# Patient Record
Sex: FEMALE | Race: WHITE | NOT HISPANIC OR LATINO | Employment: FULL TIME | URBAN - METROPOLITAN AREA
[De-identification: names, ages, dates, MRNs, and addresses within clinical notes are randomized per-mention and may not be internally consistent; named-entity substitution may affect disease eponyms.]

---

## 2018-01-01 ENCOUNTER — TELEPHONE (OUTPATIENT)
Dept: HEMATOLOGY/ONCOLOGY | Facility: CLINIC | Age: 63
End: 2018-01-01

## 2018-01-01 ENCOUNTER — RESEARCH ENCOUNTER (OUTPATIENT)
Dept: RESEARCH | Facility: HOSPITAL | Age: 63
End: 2018-01-01

## 2018-01-01 ENCOUNTER — INFUSION (OUTPATIENT)
Dept: INFUSION THERAPY | Facility: HOSPITAL | Age: 63
End: 2018-01-01
Attending: INTERNAL MEDICINE
Payer: COMMERCIAL

## 2018-01-01 ENCOUNTER — DOCUMENTATION ONLY (OUTPATIENT)
Dept: HEMATOLOGY/ONCOLOGY | Facility: CLINIC | Age: 63
End: 2018-01-01

## 2018-01-01 ENCOUNTER — PATIENT MESSAGE (OUTPATIENT)
Dept: HEMATOLOGY/ONCOLOGY | Facility: CLINIC | Age: 63
End: 2018-01-01

## 2018-01-01 ENCOUNTER — HOSPITAL ENCOUNTER (INPATIENT)
Facility: HOSPITAL | Age: 63
LOS: 1 days | DRG: 064 | End: 2018-04-25
Attending: EMERGENCY MEDICINE | Admitting: PSYCHIATRY & NEUROLOGY
Payer: COMMERCIAL

## 2018-01-01 ENCOUNTER — OFFICE VISIT (OUTPATIENT)
Dept: HEMATOLOGY/ONCOLOGY | Facility: CLINIC | Age: 63
End: 2018-01-01
Payer: COMMERCIAL

## 2018-01-01 ENCOUNTER — HOSPITAL ENCOUNTER (INPATIENT)
Facility: HOSPITAL | Age: 63
LOS: 5 days | Discharge: HOME | DRG: 812 | End: 2018-03-22
Attending: EMERGENCY MEDICINE | Admitting: EMERGENCY MEDICINE
Payer: COMMERCIAL

## 2018-01-01 ENCOUNTER — TELEPHONE (OUTPATIENT)
Dept: INTERVENTIONAL RADIOLOGY/VASCULAR | Facility: HOSPITAL | Age: 63
End: 2018-01-01

## 2018-01-01 ENCOUNTER — LAB VISIT (OUTPATIENT)
Dept: LAB | Facility: HOSPITAL | Age: 63
End: 2018-01-01
Attending: INTERNAL MEDICINE
Payer: COMMERCIAL

## 2018-01-01 ENCOUNTER — HOSPITAL ENCOUNTER (INPATIENT)
Facility: HOSPITAL | Age: 63
LOS: 1 days | Discharge: HOME | DRG: 813 | End: 2018-04-08
Attending: EMERGENCY MEDICINE | Admitting: INTERNAL MEDICINE
Payer: COMMERCIAL

## 2018-01-01 ENCOUNTER — OFFICE VISIT (OUTPATIENT)
Dept: HEMATOLOGY/ONCOLOGY | Facility: CLINIC | Age: 63
DRG: 064 | End: 2018-01-01
Payer: COMMERCIAL

## 2018-01-01 ENCOUNTER — HOSPITAL ENCOUNTER (OUTPATIENT)
Facility: HOSPITAL | Age: 63
Discharge: HOME | End: 2018-04-05
Attending: INTERNAL MEDICINE | Admitting: RADIOLOGY
Payer: COMMERCIAL

## 2018-01-01 ENCOUNTER — TELEPHONE (OUTPATIENT)
Dept: PHARMACY | Facility: CLINIC | Age: 63
End: 2018-01-01

## 2018-01-01 ENCOUNTER — PATIENT OUTREACH (OUTPATIENT)
Dept: ADMINISTRATIVE | Facility: CLINIC | Age: 63
End: 2018-01-01

## 2018-01-01 VITALS
SYSTOLIC BLOOD PRESSURE: 123 MMHG | RESPIRATION RATE: 17 BRPM | RESPIRATION RATE: 16 BRPM | TEMPERATURE: 99 F | HEART RATE: 68 BPM | DIASTOLIC BLOOD PRESSURE: 86 MMHG | DIASTOLIC BLOOD PRESSURE: 74 MMHG | SYSTOLIC BLOOD PRESSURE: 138 MMHG | BODY MASS INDEX: 28.85 KG/M2 | HEART RATE: 78 BPM | TEMPERATURE: 98 F | TEMPERATURE: 99 F | OXYGEN SATURATION: 99 % | RESPIRATION RATE: 18 BRPM | DIASTOLIC BLOOD PRESSURE: 58 MMHG | SYSTOLIC BLOOD PRESSURE: 102 MMHG | WEIGHT: 169 LBS | HEIGHT: 64 IN | HEART RATE: 77 BPM

## 2018-01-01 VITALS
OXYGEN SATURATION: 96 % | TEMPERATURE: 99 F | SYSTOLIC BLOOD PRESSURE: 137 MMHG | WEIGHT: 178 LBS | HEIGHT: 64 IN | BODY MASS INDEX: 30.39 KG/M2 | DIASTOLIC BLOOD PRESSURE: 67 MMHG | RESPIRATION RATE: 18 BRPM | HEART RATE: 70 BPM

## 2018-01-01 VITALS
RESPIRATION RATE: 18 BRPM | DIASTOLIC BLOOD PRESSURE: 81 MMHG | SYSTOLIC BLOOD PRESSURE: 130 MMHG | HEART RATE: 67 BPM | TEMPERATURE: 99 F

## 2018-01-01 VITALS
SYSTOLIC BLOOD PRESSURE: 132 MMHG | SYSTOLIC BLOOD PRESSURE: 78 MMHG | RESPIRATION RATE: 14 BRPM | DIASTOLIC BLOOD PRESSURE: 89 MMHG | DIASTOLIC BLOOD PRESSURE: 52 MMHG | SYSTOLIC BLOOD PRESSURE: 134 MMHG | TEMPERATURE: 97 F | OXYGEN SATURATION: 100 % | RESPIRATION RATE: 18 BRPM | HEIGHT: 64 IN | HEART RATE: 49 BPM | WEIGHT: 176 LBS | RESPIRATION RATE: 18 BRPM | HEART RATE: 90 BPM | OXYGEN SATURATION: 98 % | DIASTOLIC BLOOD PRESSURE: 72 MMHG | TEMPERATURE: 99 F | WEIGHT: 168.88 LBS | BODY MASS INDEX: 28.14 KG/M2 | HEART RATE: 82 BPM | TEMPERATURE: 98 F | BODY MASS INDEX: 30.05 KG/M2 | HEIGHT: 65 IN

## 2018-01-01 VITALS
HEART RATE: 89 BPM | WEIGHT: 181.69 LBS | TEMPERATURE: 99 F | DIASTOLIC BLOOD PRESSURE: 80 MMHG | SYSTOLIC BLOOD PRESSURE: 142 MMHG | HEART RATE: 60 BPM | SYSTOLIC BLOOD PRESSURE: 140 MMHG | OXYGEN SATURATION: 95 % | RESPIRATION RATE: 18 BRPM | BODY MASS INDEX: 29.48 KG/M2 | HEIGHT: 64 IN | WEIGHT: 171.75 LBS | TEMPERATURE: 98 F | BODY MASS INDEX: 31.02 KG/M2 | DIASTOLIC BLOOD PRESSURE: 82 MMHG

## 2018-01-01 VITALS
OXYGEN SATURATION: 94 % | RESPIRATION RATE: 18 BRPM | DIASTOLIC BLOOD PRESSURE: 60 MMHG | WEIGHT: 169 LBS | TEMPERATURE: 98 F | BODY MASS INDEX: 28.85 KG/M2 | SYSTOLIC BLOOD PRESSURE: 115 MMHG | HEART RATE: 76 BPM | HEIGHT: 64 IN

## 2018-01-01 VITALS
TEMPERATURE: 99 F | OXYGEN SATURATION: 97 % | RESPIRATION RATE: 18 BRPM | HEART RATE: 93 BPM | DIASTOLIC BLOOD PRESSURE: 72 MMHG | BODY MASS INDEX: 30.27 KG/M2 | SYSTOLIC BLOOD PRESSURE: 136 MMHG | WEIGHT: 176.38 LBS

## 2018-01-01 VITALS
HEART RATE: 84 BPM | HEIGHT: 64 IN | DIASTOLIC BLOOD PRESSURE: 65 MMHG | RESPIRATION RATE: 18 BRPM | WEIGHT: 175.94 LBS | SYSTOLIC BLOOD PRESSURE: 119 MMHG | BODY MASS INDEX: 30.04 KG/M2 | RESPIRATION RATE: 18 BRPM | SYSTOLIC BLOOD PRESSURE: 105 MMHG | TEMPERATURE: 99 F | TEMPERATURE: 99 F | DIASTOLIC BLOOD PRESSURE: 68 MMHG | HEART RATE: 60 BPM

## 2018-01-01 VITALS
SYSTOLIC BLOOD PRESSURE: 177 MMHG | HEART RATE: 86 BPM | SYSTOLIC BLOOD PRESSURE: 137 MMHG | DIASTOLIC BLOOD PRESSURE: 96 MMHG | RESPIRATION RATE: 18 BRPM | HEART RATE: 89 BPM | DIASTOLIC BLOOD PRESSURE: 83 MMHG | RESPIRATION RATE: 18 BRPM | TEMPERATURE: 98 F | TEMPERATURE: 99 F

## 2018-01-01 VITALS
SYSTOLIC BLOOD PRESSURE: 156 MMHG | TEMPERATURE: 98 F | HEART RATE: 70 BPM | RESPIRATION RATE: 20 BRPM | DIASTOLIC BLOOD PRESSURE: 82 MMHG

## 2018-01-01 VITALS
HEART RATE: 95 BPM | TEMPERATURE: 99 F | RESPIRATION RATE: 18 BRPM | SYSTOLIC BLOOD PRESSURE: 143 MMHG | DIASTOLIC BLOOD PRESSURE: 86 MMHG

## 2018-01-01 VITALS
HEART RATE: 90 BPM | TEMPERATURE: 98 F | SYSTOLIC BLOOD PRESSURE: 114 MMHG | DIASTOLIC BLOOD PRESSURE: 68 MMHG | RESPIRATION RATE: 16 BRPM

## 2018-01-01 DIAGNOSIS — D46.9 MDS (MYELODYSPLASTIC SYNDROME): ICD-10-CM

## 2018-01-01 DIAGNOSIS — D46.9 MDS (MYELODYSPLASTIC SYNDROME): Primary | ICD-10-CM

## 2018-01-01 DIAGNOSIS — Z00.6 EXAMINATION OF PARTICIPANT IN CLINICAL TRIAL: ICD-10-CM

## 2018-01-01 DIAGNOSIS — D69.6 THROMBOCYTOPENIA: Primary | ICD-10-CM

## 2018-01-01 DIAGNOSIS — F32.9 REACTIVE DEPRESSION: Primary | ICD-10-CM

## 2018-01-01 DIAGNOSIS — D69.6 THROMBOCYTOPENIA, UNSPECIFIED: ICD-10-CM

## 2018-01-01 DIAGNOSIS — D69.6 THROMBOCYTOPENIA: ICD-10-CM

## 2018-01-01 DIAGNOSIS — Z95.828 PORT-A-CATH IN PLACE: ICD-10-CM

## 2018-01-01 DIAGNOSIS — J45.20 MILD INTERMITTENT ASTHMA WITHOUT COMPLICATION: ICD-10-CM

## 2018-01-01 DIAGNOSIS — D46.Z MDS (MYELODYSPLASTIC SYNDROME), HIGH GRADE: Primary | ICD-10-CM

## 2018-01-01 DIAGNOSIS — D46.Z MDS (MYELODYSPLASTIC SYNDROME), HIGH GRADE: ICD-10-CM

## 2018-01-01 DIAGNOSIS — D46.C MDS (MYELODYSPLASTIC SYNDROME) WITH 5Q DELETION: ICD-10-CM

## 2018-01-01 DIAGNOSIS — Z72.0 TOBACCO ABUSE: ICD-10-CM

## 2018-01-01 DIAGNOSIS — D64.9 SYMPTOMATIC ANEMIA: ICD-10-CM

## 2018-01-01 DIAGNOSIS — D69.9 BLEEDING DISORDER: Primary | ICD-10-CM

## 2018-01-01 DIAGNOSIS — D64.9 ANEMIA, UNSPECIFIED TYPE: ICD-10-CM

## 2018-01-01 DIAGNOSIS — S06.6X9A SUBARACHNOID HEMATOMA WITH LOSS OF CONSCIOUSNESS, INITIAL ENCOUNTER: Primary | ICD-10-CM

## 2018-01-01 DIAGNOSIS — I10 ESSENTIAL HYPERTENSION: ICD-10-CM

## 2018-01-01 DIAGNOSIS — D46.C MDS (MYELODYSPLASTIC SYNDROME) WITH 5Q DELETION: Primary | ICD-10-CM

## 2018-01-01 DIAGNOSIS — E87.6 HYPOKALEMIA: Primary | ICD-10-CM

## 2018-01-01 DIAGNOSIS — I60.9 SAH (SUBARACHNOID HEMORRHAGE): ICD-10-CM

## 2018-01-01 DIAGNOSIS — R06.02 SOB (SHORTNESS OF BREATH): ICD-10-CM

## 2018-01-01 DIAGNOSIS — E87.6 HYPOKALEMIA: ICD-10-CM

## 2018-01-01 DIAGNOSIS — I63.9 STROKE: ICD-10-CM

## 2018-01-01 DIAGNOSIS — J44.9 CHRONIC OBSTRUCTIVE PULMONARY DISEASE, UNSPECIFIED COPD TYPE: ICD-10-CM

## 2018-01-01 DIAGNOSIS — D61.818 PANCYTOPENIA: Primary | ICD-10-CM

## 2018-01-01 DIAGNOSIS — I60.9 NONTRAUMATIC SUBARACHNOID HEMORRHAGE: ICD-10-CM

## 2018-01-01 DIAGNOSIS — R11.0 NAUSEA: ICD-10-CM

## 2018-01-01 LAB
ABO + RH BLD: NORMAL
ALBUMIN SERPL BCP-MCNC: 2.2 G/DL
ALBUMIN SERPL BCP-MCNC: 2.5 G/DL
ALBUMIN SERPL BCP-MCNC: 2.9 G/DL
ALBUMIN SERPL BCP-MCNC: 3.3 G/DL
ALBUMIN SERPL BCP-MCNC: 3.4 G/DL
ALBUMIN SERPL BCP-MCNC: 3.5 G/DL
ALBUMIN SERPL BCP-MCNC: 3.7 G/DL
ALLENS TEST: ABNORMAL
ALP SERPL-CCNC: 135 U/L
ALP SERPL-CCNC: 57 U/L
ALP SERPL-CCNC: 59 U/L
ALP SERPL-CCNC: 62 U/L
ALP SERPL-CCNC: 65 U/L
ALP SERPL-CCNC: 86 U/L
ALP SERPL-CCNC: 95 U/L
ALT SERPL W/O P-5'-P-CCNC: 10 U/L
ALT SERPL W/O P-5'-P-CCNC: 12 U/L
ALT SERPL W/O P-5'-P-CCNC: 21 U/L
ALT SERPL W/O P-5'-P-CCNC: 37 U/L
ALT SERPL W/O P-5'-P-CCNC: 59 U/L
ALT SERPL W/O P-5'-P-CCNC: 61 U/L
ALT SERPL W/O P-5'-P-CCNC: 90 U/L
ANA SER QL IF: NORMAL
ANION GAP SERPL CALC-SCNC: 10 MMOL/L
ANION GAP SERPL CALC-SCNC: 11 MMOL/L
ANION GAP SERPL CALC-SCNC: 11 MMOL/L
ANION GAP SERPL CALC-SCNC: 12 MMOL/L
ANION GAP SERPL CALC-SCNC: 17 MMOL/L
ANION GAP SERPL CALC-SCNC: 8 MMOL/L
ANION GAP SERPL CALC-SCNC: 9 MMOL/L
ANISOCYTOSIS BLD QL SMEAR: ABNORMAL
ANISOCYTOSIS BLD QL SMEAR: SLIGHT
APTT BLDCRRT: 23.5 SEC
APTT BLDCRRT: 24.3 SEC
APTT BLDCRRT: 26.2 SEC
APTT BLDCRRT: 26.7 SEC
AST SERPL-CCNC: 14 U/L
AST SERPL-CCNC: 14 U/L
AST SERPL-CCNC: 19 U/L
AST SERPL-CCNC: 27 U/L
AST SERPL-CCNC: 47 U/L
AST SERPL-CCNC: 50 U/L
AST SERPL-CCNC: 85 U/L
BACTERIA BLD CULT: NORMAL
BACTERIA BLD CULT: NORMAL
BASO STIPL BLD QL SMEAR: ABNORMAL
BASOPHILS # BLD AUTO: 0 K/UL
BASOPHILS # BLD AUTO: 0 K/UL
BASOPHILS # BLD AUTO: ABNORMAL K/UL
BASOPHILS NFR BLD: 0 %
BASOPHILS NFR BLD: 1 %
BASOPHILS NFR BLD: 1 %
BILIRUB SERPL-MCNC: 1 MG/DL
BILIRUB SERPL-MCNC: 1.2 MG/DL
BILIRUB SERPL-MCNC: 1.8 MG/DL
BILIRUB SERPL-MCNC: 2 MG/DL
BILIRUB SERPL-MCNC: 2.3 MG/DL
BILIRUB SERPL-MCNC: 2.4 MG/DL
BILIRUB SERPL-MCNC: 2.4 MG/DL
BILIRUB UR QL STRIP: NEGATIVE
BILIRUB UR QL STRIP: NEGATIVE
BLASTS NFR BLD MANUAL: 1 %
BLASTS NFR BLD MANUAL: 13 %
BLASTS NFR BLD MANUAL: 14 %
BLASTS NFR BLD MANUAL: 2 %
BLASTS NFR BLD MANUAL: 3 %
BLASTS NFR BLD MANUAL: 3 %
BLASTS NFR BLD MANUAL: 4 %
BLASTS NFR BLD MANUAL: 4 %
BLD GP AB SCN CELLS X3 SERPL QL: NORMAL
BLD PROD TYP BPU: NORMAL
BLOOD GROUP ANTIBODIES SERPL: NORMAL
BLOOD GROUP ANTIBODIES SERPL: NORMAL
BLOOD UNIT EXPIRATION DATE: NORMAL
BLOOD UNIT TYPE CODE: 1700
BLOOD UNIT TYPE CODE: 5100
BLOOD UNIT TYPE CODE: 600
BLOOD UNIT TYPE CODE: 6200
BLOOD UNIT TYPE CODE: 7300
BLOOD UNIT TYPE CODE: 9500
BLOOD UNIT TYPE: NORMAL
BODY SITE - BONE MARROW: NORMAL
BONE MARROW IRON STAIN COMMENT: NORMAL
BONE MARROW WRIGHT STAIN COMMENT: NORMAL
BUN SERPL-MCNC: 10 MG/DL
BUN SERPL-MCNC: 12 MG/DL
BUN SERPL-MCNC: 14 MG/DL
BUN SERPL-MCNC: 14 MG/DL
BUN SERPL-MCNC: 15 MG/DL
BUN SERPL-MCNC: 15 MG/DL
BUN SERPL-MCNC: 16 MG/DL
BUN SERPL-MCNC: 16 MG/DL
BUN SERPL-MCNC: 16 MG/DL (ref 6–30)
BUN SERPL-MCNC: 17 MG/DL
BUN SERPL-MCNC: 18 MG/DL
BUN SERPL-MCNC: 25 MG/DL
BUN SERPL-MCNC: 29 MG/DL
BURR CELLS BLD QL SMEAR: ABNORMAL
CALCIUM SERPL-MCNC: 7.6 MG/DL
CALCIUM SERPL-MCNC: 8.2 MG/DL
CALCIUM SERPL-MCNC: 8.3 MG/DL
CALCIUM SERPL-MCNC: 8.6 MG/DL
CALCIUM SERPL-MCNC: 8.7 MG/DL
CALCIUM SERPL-MCNC: 8.7 MG/DL
CALCIUM SERPL-MCNC: 8.8 MG/DL
CALCIUM SERPL-MCNC: 9 MG/DL
CALCIUM SERPL-MCNC: 9 MG/DL
CALCIUM SERPL-MCNC: 9.3 MG/DL
CALCIUM SERPL-MCNC: 9.4 MG/DL
CALCIUM SERPL-MCNC: 9.6 MG/DL
CHLORIDE SERPL-SCNC: 100 MMOL/L
CHLORIDE SERPL-SCNC: 100 MMOL/L
CHLORIDE SERPL-SCNC: 101 MMOL/L
CHLORIDE SERPL-SCNC: 101 MMOL/L (ref 95–110)
CHLORIDE SERPL-SCNC: 102 MMOL/L
CHLORIDE SERPL-SCNC: 103 MMOL/L
CHLORIDE SERPL-SCNC: 103 MMOL/L
CHLORIDE SERPL-SCNC: 104 MMOL/L
CHLORIDE SERPL-SCNC: 104 MMOL/L
CHLORIDE SERPL-SCNC: 105 MMOL/L
CHLORIDE SERPL-SCNC: 97 MMOL/L
CHLORIDE SERPL-SCNC: 97 MMOL/L
CHLORIDE SERPL-SCNC: 98 MMOL/L
CHOLEST SERPL-MCNC: 99 MG/DL
CHOLEST/HDLC SERPL: 5 {RATIO}
CHROM BANDING METHOD: NORMAL
CHROMOSOME ANALYSIS BM ADDITIONAL INFORMATION: NORMAL
CHROMOSOME ANALYSIS BM RELEASED BY: NORMAL
CHROMOSOME ANALYSIS BM RESULT SUMMARY: NORMAL
CK MB SERPL-MCNC: 0.6 NG/ML
CK MB SERPL-RTO: 1.9 %
CK SERPL-CCNC: 32 U/L
CLARITY UR REFRACT.AUTO: CLEAR
CLARITY UR REFRACT.AUTO: CLEAR
CLINICAL CYTOGENETICIST REVIEW: NORMAL
CLINICAL DIAGNOSIS - BONE MARROW: NORMAL
CO2 SERPL-SCNC: 14 MMOL/L
CO2 SERPL-SCNC: 21 MMOL/L
CO2 SERPL-SCNC: 21 MMOL/L
CO2 SERPL-SCNC: 22 MMOL/L
CO2 SERPL-SCNC: 23 MMOL/L
CO2 SERPL-SCNC: 24 MMOL/L
CO2 SERPL-SCNC: 26 MMOL/L
CO2 SERPL-SCNC: 27 MMOL/L
CO2 SERPL-SCNC: 28 MMOL/L
CO2 SERPL-SCNC: 29 MMOL/L
CODING SYSTEM: NORMAL
COLOR UR AUTO: YELLOW
COLOR UR AUTO: YELLOW
CREAT SERPL-MCNC: 0.5 MG/DL (ref 0.5–1.4)
CREAT SERPL-MCNC: 0.6 MG/DL
CREAT SERPL-MCNC: 0.7 MG/DL
CREAT SERPL-MCNC: 0.8 MG/DL
CRP SERPL-MCNC: 114.5 MG/L
DAT IGG-SP REAG RBC-IMP: NORMAL
DELSYS: ABNORMAL
DIFFERENTIAL METHOD: ABNORMAL
DISPENSE STATUS: NORMAL
EOSINOPHIL # BLD AUTO: 0 K/UL
EOSINOPHIL # BLD AUTO: 0 K/UL
EOSINOPHIL # BLD AUTO: ABNORMAL K/UL
EOSINOPHIL NFR BLD: 0 %
EOSINOPHIL NFR BLD: 1 %
EOSINOPHIL NFR BLD: 2 %
EOSINOPHIL NFR BLD: 2 %
ERYTHROCYTE [DISTWIDTH] IN BLOOD BY AUTOMATED COUNT: 12.7 %
ERYTHROCYTE [DISTWIDTH] IN BLOOD BY AUTOMATED COUNT: 12.7 %
ERYTHROCYTE [DISTWIDTH] IN BLOOD BY AUTOMATED COUNT: 12.9 %
ERYTHROCYTE [DISTWIDTH] IN BLOOD BY AUTOMATED COUNT: 13.3 %
ERYTHROCYTE [DISTWIDTH] IN BLOOD BY AUTOMATED COUNT: 13.3 %
ERYTHROCYTE [DISTWIDTH] IN BLOOD BY AUTOMATED COUNT: 13.4 %
ERYTHROCYTE [DISTWIDTH] IN BLOOD BY AUTOMATED COUNT: 13.5 %
ERYTHROCYTE [DISTWIDTH] IN BLOOD BY AUTOMATED COUNT: 13.5 %
ERYTHROCYTE [DISTWIDTH] IN BLOOD BY AUTOMATED COUNT: 13.6 %
ERYTHROCYTE [DISTWIDTH] IN BLOOD BY AUTOMATED COUNT: 13.6 %
ERYTHROCYTE [DISTWIDTH] IN BLOOD BY AUTOMATED COUNT: 14 %
ERYTHROCYTE [DISTWIDTH] IN BLOOD BY AUTOMATED COUNT: 14.1 %
ERYTHROCYTE [DISTWIDTH] IN BLOOD BY AUTOMATED COUNT: 14.9 %
ERYTHROCYTE [DISTWIDTH] IN BLOOD BY AUTOMATED COUNT: 15.7 %
ERYTHROCYTE [DISTWIDTH] IN BLOOD BY AUTOMATED COUNT: 16.1 %
ERYTHROCYTE [DISTWIDTH] IN BLOOD BY AUTOMATED COUNT: 16.4 %
ERYTHROCYTE [DISTWIDTH] IN BLOOD BY AUTOMATED COUNT: 16.6 %
ERYTHROCYTE [DISTWIDTH] IN BLOOD BY AUTOMATED COUNT: 16.7 %
ERYTHROCYTE [DISTWIDTH] IN BLOOD BY AUTOMATED COUNT: 17.6 %
ERYTHROCYTE [DISTWIDTH] IN BLOOD BY AUTOMATED COUNT: 17.8 %
ERYTHROCYTE [DISTWIDTH] IN BLOOD BY AUTOMATED COUNT: 18.1 %
ERYTHROCYTE [DISTWIDTH] IN BLOOD BY AUTOMATED COUNT: 18.2 %
ERYTHROCYTE [DISTWIDTH] IN BLOOD BY AUTOMATED COUNT: 18.3 %
ERYTHROCYTE [DISTWIDTH] IN BLOOD BY AUTOMATED COUNT: 18.6 %
ERYTHROCYTE [SEDIMENTATION RATE] IN BLOOD BY WESTERGREN METHOD: 16 MM/H
ERYTHROCYTE [SEDIMENTATION RATE] IN BLOOD BY WESTERGREN METHOD: 65 MM/HR
EST. GFR  (AFRICAN AMERICAN): >60 ML/MIN/1.73 M^2
EST. GFR  (NON AFRICAN AMERICAN): >60 ML/MIN/1.73 M^2
ESTIMATED AVG GLUCOSE: 100 MG/DL
FIO2: 50
FLOW CYTOMETRY ANTIBODIES ANALYZED - BONE MARROW: NORMAL
FLOW CYTOMETRY COMMENT - BONE MARROW: NORMAL
FLOW CYTOMETRY INTERPRETATION - BONE MARROW: NORMAL
GLUCOSE SERPL-MCNC: 101 MG/DL
GLUCOSE SERPL-MCNC: 101 MG/DL
GLUCOSE SERPL-MCNC: 105 MG/DL
GLUCOSE SERPL-MCNC: 105 MG/DL
GLUCOSE SERPL-MCNC: 109 MG/DL
GLUCOSE SERPL-MCNC: 111 MG/DL
GLUCOSE SERPL-MCNC: 115 MG/DL
GLUCOSE SERPL-MCNC: 115 MG/DL
GLUCOSE SERPL-MCNC: 116 MG/DL
GLUCOSE SERPL-MCNC: 137 MG/DL
GLUCOSE SERPL-MCNC: 149 MG/DL
GLUCOSE SERPL-MCNC: 153 MG/DL
GLUCOSE SERPL-MCNC: 153 MG/DL
GLUCOSE SERPL-MCNC: 180 MG/DL
GLUCOSE SERPL-MCNC: 180 MG/DL (ref 70–110)
GLUCOSE UR QL STRIP: NEGATIVE
GLUCOSE UR QL STRIP: NEGATIVE
HAPTOGLOB SERPL-MCNC: 189 MG/DL
HBA1C MFR BLD HPLC: 5.1 %
HCO3 UR-SCNC: 21.3 MMOL/L (ref 24–28)
HCT VFR BLD AUTO: 12.6 %
HCT VFR BLD AUTO: 13 %
HCT VFR BLD AUTO: 14.3 %
HCT VFR BLD AUTO: 15.4 %
HCT VFR BLD AUTO: 15.9 %
HCT VFR BLD AUTO: 16 %
HCT VFR BLD AUTO: 17.1 %
HCT VFR BLD AUTO: 17.7 %
HCT VFR BLD AUTO: 17.8 %
HCT VFR BLD AUTO: 18.8 %
HCT VFR BLD AUTO: 19 %
HCT VFR BLD AUTO: 19.7 %
HCT VFR BLD AUTO: 19.8 %
HCT VFR BLD AUTO: 20.8 %
HCT VFR BLD AUTO: 21.1 %
HCT VFR BLD AUTO: 21.6 %
HCT VFR BLD AUTO: 22 %
HCT VFR BLD AUTO: 22 %
HCT VFR BLD AUTO: 22.1 %
HCT VFR BLD AUTO: 23.6 %
HCT VFR BLD AUTO: 25.2 %
HCT VFR BLD AUTO: 26.1 %
HCT VFR BLD AUTO: 27.9 %
HCT VFR BLD AUTO: 28.1 %
HCT VFR BLD CALC: 23 %PCV (ref 36–54)
HDLC SERPL-MCNC: 20 MG/DL
HDLC SERPL: 20.2 %
HGB BLD-MCNC: 4.3 G/DL
HGB BLD-MCNC: 4.5 G/DL
HGB BLD-MCNC: 5 G/DL
HGB BLD-MCNC: 5.4 G/DL
HGB BLD-MCNC: 5.5 G/DL
HGB BLD-MCNC: 5.5 G/DL
HGB BLD-MCNC: 6.1 G/DL
HGB BLD-MCNC: 6.1 G/DL
HGB BLD-MCNC: 6.2 G/DL
HGB BLD-MCNC: 6.5 G/DL
HGB BLD-MCNC: 6.5 G/DL
HGB BLD-MCNC: 6.9 G/DL
HGB BLD-MCNC: 6.9 G/DL
HGB BLD-MCNC: 7.1 G/DL
HGB BLD-MCNC: 7.3 G/DL
HGB BLD-MCNC: 7.5 G/DL
HGB BLD-MCNC: 7.6 G/DL
HGB BLD-MCNC: 7.9 G/DL
HGB BLD-MCNC: 8.1 G/DL
HGB BLD-MCNC: 8.1 G/DL
HGB BLD-MCNC: 8.9 G/DL
HGB BLD-MCNC: 9.2 G/DL
HGB BLD-MCNC: 9.7 G/DL
HGB BLD-MCNC: 9.9 G/DL
HGB UR QL STRIP: ABNORMAL
HGB UR QL STRIP: ABNORMAL
HYPOCHROMIA BLD QL SMEAR: ABNORMAL
IMM GRANULOCYTES # BLD AUTO: 0 K/UL
IMM GRANULOCYTES # BLD AUTO: 0 K/UL
IMM GRANULOCYTES # BLD AUTO: 0.64 K/UL
IMM GRANULOCYTES # BLD AUTO: 1.44 K/UL
IMM GRANULOCYTES # BLD AUTO: ABNORMAL K/UL
IMM GRANULOCYTES NFR BLD AUTO: 0 %
IMM GRANULOCYTES NFR BLD AUTO: 0 %
IMM GRANULOCYTES NFR BLD AUTO: ABNORMAL %
INR PPP: 1.1
INR PPP: 1.2
KARYOTYP MAR: NORMAL
KETONES UR QL STRIP: NEGATIVE
KETONES UR QL STRIP: NEGATIVE
LDH SERPL L TO P-CCNC: 617 U/L
LDLC SERPL CALC-MCNC: 59.2 MG/DL
LEUKOCYTE ESTERASE UR QL STRIP: NEGATIVE
LEUKOCYTE ESTERASE UR QL STRIP: NEGATIVE
LYMPHOCYTES # BLD AUTO: 0.2 K/UL
LYMPHOCYTES # BLD AUTO: 0.3 K/UL
LYMPHOCYTES # BLD AUTO: ABNORMAL K/UL
LYMPHOCYTES NFR BLD: 25 %
LYMPHOCYTES NFR BLD: 25 %
LYMPHOCYTES NFR BLD: 27 %
LYMPHOCYTES NFR BLD: 28 %
LYMPHOCYTES NFR BLD: 29 %
LYMPHOCYTES NFR BLD: 32 %
LYMPHOCYTES NFR BLD: 34 %
LYMPHOCYTES NFR BLD: 34 %
LYMPHOCYTES NFR BLD: 37 %
LYMPHOCYTES NFR BLD: 37 %
LYMPHOCYTES NFR BLD: 38 %
LYMPHOCYTES NFR BLD: 39 %
LYMPHOCYTES NFR BLD: 39 %
LYMPHOCYTES NFR BLD: 43 %
LYMPHOCYTES NFR BLD: 44 %
LYMPHOCYTES NFR BLD: 45 %
LYMPHOCYTES NFR BLD: 47 %
LYMPHOCYTES NFR BLD: 54 %
LYMPHOCYTES NFR BLD: 76 %
LYMPHOCYTES NFR BLD: 83.3 %
LYMPHOCYTES NFR BLD: 86.5 %
LYMPHOCYTES NFR BLD: 9 %
MAGNESIUM SERPL-MCNC: 1.8 MG/DL
MAGNESIUM SERPL-MCNC: 2.1 MG/DL
MAGNESIUM SERPL-MCNC: 2.2 MG/DL
MAGNESIUM SERPL-MCNC: 2.3 MG/DL
MAGNESIUM SERPL-MCNC: 2.4 MG/DL
MCH RBC QN AUTO: 29.9 PG
MCH RBC QN AUTO: 30 PG
MCH RBC QN AUTO: 30.2 PG
MCH RBC QN AUTO: 30.3 PG
MCH RBC QN AUTO: 30.7 PG
MCH RBC QN AUTO: 31 PG
MCH RBC QN AUTO: 31.1 PG
MCH RBC QN AUTO: 31.1 PG
MCH RBC QN AUTO: 31.2 PG
MCH RBC QN AUTO: 31.8 PG
MCH RBC QN AUTO: 32.6 PG
MCH RBC QN AUTO: 32.9 PG
MCH RBC QN AUTO: 33.3 PG
MCH RBC QN AUTO: 33.5 PG
MCH RBC QN AUTO: 33.7 PG
MCH RBC QN AUTO: 34.5 PG
MCH RBC QN AUTO: 36 PG
MCH RBC QN AUTO: 36.2 PG
MCHC RBC AUTO-ENTMCNC: 33.6 G/DL
MCHC RBC AUTO-ENTMCNC: 34 G/DL
MCHC RBC AUTO-ENTMCNC: 34.1 G/DL
MCHC RBC AUTO-ENTMCNC: 34.2 G/DL
MCHC RBC AUTO-ENTMCNC: 34.3 G/DL
MCHC RBC AUTO-ENTMCNC: 34.3 G/DL
MCHC RBC AUTO-ENTMCNC: 34.4 G/DL
MCHC RBC AUTO-ENTMCNC: 34.5 G/DL
MCHC RBC AUTO-ENTMCNC: 34.5 G/DL
MCHC RBC AUTO-ENTMCNC: 34.6 G/DL
MCHC RBC AUTO-ENTMCNC: 34.6 G/DL
MCHC RBC AUTO-ENTMCNC: 34.7 G/DL
MCHC RBC AUTO-ENTMCNC: 34.8 G/DL
MCHC RBC AUTO-ENTMCNC: 35 G/DL
MCHC RBC AUTO-ENTMCNC: 35.1 G/DL
MCHC RBC AUTO-ENTMCNC: 35.2 G/DL
MCHC RBC AUTO-ENTMCNC: 35.3 G/DL
MCHC RBC AUTO-ENTMCNC: 35.5 G/DL
MCHC RBC AUTO-ENTMCNC: 35.7 G/DL
MCHC RBC AUTO-ENTMCNC: 35.7 G/DL
MCHC RBC AUTO-ENTMCNC: 35.9 G/DL
MCHC RBC AUTO-ENTMCNC: 36.7 G/DL
MCV RBC AUTO: 101 FL
MCV RBC AUTO: 103 FL
MCV RBC AUTO: 86 FL
MCV RBC AUTO: 87 FL
MCV RBC AUTO: 88 FL
MCV RBC AUTO: 89 FL
MCV RBC AUTO: 89 FL
MCV RBC AUTO: 90 FL
MCV RBC AUTO: 91 FL
MCV RBC AUTO: 93 FL
MCV RBC AUTO: 93 FL
MCV RBC AUTO: 94 FL
MCV RBC AUTO: 95 FL
MCV RBC AUTO: 95 FL
MCV RBC AUTO: 96 FL
MCV RBC AUTO: 96 FL
MCV RBC AUTO: 97 FL
MCV RBC AUTO: 98 FL
METAMYELOCYTES NFR BLD MANUAL: 10 %
METAMYELOCYTES NFR BLD MANUAL: 13 %
METAMYELOCYTES NFR BLD MANUAL: 2 %
METAMYELOCYTES NFR BLD MANUAL: 3 %
METAMYELOCYTES NFR BLD MANUAL: 4 %
METAMYELOCYTES NFR BLD MANUAL: 5 %
METAMYELOCYTES NFR BLD MANUAL: 5 %
METAMYELOCYTES NFR BLD MANUAL: 6 %
METAMYELOCYTES NFR BLD MANUAL: 9 %
METAMYELOCYTES NFR BLD MANUAL: 9 %
MICROSCOPIC COMMENT: ABNORMAL
MICROSCOPIC COMMENT: NORMAL
MIN VOL: 7.5
MODE: ABNORMAL
MONOCYTES # BLD AUTO: 0 K/UL
MONOCYTES # BLD AUTO: 0 K/UL
MONOCYTES # BLD AUTO: ABNORMAL K/UL
MONOCYTES NFR BLD: 0 %
MONOCYTES NFR BLD: 1 %
MONOCYTES NFR BLD: 10 %
MONOCYTES NFR BLD: 12 %
MONOCYTES NFR BLD: 13 %
MONOCYTES NFR BLD: 13 %
MONOCYTES NFR BLD: 2 %
MONOCYTES NFR BLD: 2.7 %
MONOCYTES NFR BLD: 20 %
MONOCYTES NFR BLD: 3 %
MONOCYTES NFR BLD: 3 %
MONOCYTES NFR BLD: 4 %
MONOCYTES NFR BLD: 4 %
MONOCYTES NFR BLD: 5 %
MONOCYTES NFR BLD: 5 %
MONOCYTES NFR BLD: 7 %
MONOCYTES NFR BLD: 8 %
MONOCYTES NFR BLD: 9 %
MYELOCYTES NFR BLD MANUAL: 1 %
MYELOCYTES NFR BLD MANUAL: 10 %
MYELOCYTES NFR BLD MANUAL: 14 %
MYELOCYTES NFR BLD MANUAL: 17 %
MYELOCYTES NFR BLD MANUAL: 2 %
MYELOCYTES NFR BLD MANUAL: 2 %
MYELOCYTES NFR BLD MANUAL: 21 %
MYELOCYTES NFR BLD MANUAL: 24 %
MYELOCYTES NFR BLD MANUAL: 3 %
MYELOCYTES NFR BLD MANUAL: 4 %
MYELOCYTES NFR BLD MANUAL: 5 %
MYELOCYTES NFR BLD MANUAL: 5 %
MYELOCYTES NFR BLD MANUAL: 6 %
MYELOCYTES NFR BLD MANUAL: 7 %
MYELOCYTES NFR BLD MANUAL: 9 %
MYELOCYTES NFR BLD MANUAL: 9 %
NEUTROPHILS # BLD AUTO: 0 K/UL
NEUTROPHILS # BLD AUTO: 0 K/UL
NEUTROPHILS # BLD AUTO: 0.7 K/UL
NEUTROPHILS # BLD AUTO: 1.6 K/UL
NEUTROPHILS NFR BLD: 10.8 %
NEUTROPHILS NFR BLD: 16 %
NEUTROPHILS NFR BLD: 16.7 %
NEUTROPHILS NFR BLD: 25 %
NEUTROPHILS NFR BLD: 28 %
NEUTROPHILS NFR BLD: 29 %
NEUTROPHILS NFR BLD: 29 %
NEUTROPHILS NFR BLD: 31 %
NEUTROPHILS NFR BLD: 33 %
NEUTROPHILS NFR BLD: 35 %
NEUTROPHILS NFR BLD: 36 %
NEUTROPHILS NFR BLD: 37 %
NEUTROPHILS NFR BLD: 38 %
NEUTROPHILS NFR BLD: 38 %
NEUTROPHILS NFR BLD: 40 %
NEUTROPHILS NFR BLD: 40 %
NEUTROPHILS NFR BLD: 44 %
NEUTROPHILS NFR BLD: 45 %
NEUTROPHILS NFR BLD: 46 %
NEUTROPHILS NFR BLD: 47 %
NEUTROPHILS NFR BLD: 48 %
NEUTROPHILS NFR BLD: 56 %
NEUTS BAND NFR BLD MANUAL: 1 %
NEUTS BAND NFR BLD MANUAL: 2 %
NEUTS BAND NFR BLD MANUAL: 3 %
NEUTS BAND NFR BLD MANUAL: 3 %
NEUTS BAND NFR BLD MANUAL: 4 %
NEUTS BAND NFR BLD MANUAL: 4 %
NEUTS BAND NFR BLD MANUAL: 5 %
NEUTS BAND NFR BLD MANUAL: 6 %
NITRITE UR QL STRIP: NEGATIVE
NITRITE UR QL STRIP: NEGATIVE
NONHDLC SERPL-MCNC: 79 MG/DL
NRBC BLD-RTO: 0 /100 WBC
NRBC BLD-RTO: 1 /100 WBC
NUM UNITS TRANS PACKED RBC: NORMAL
NUM UNITS TRANS WBC-POOR PLATPHERESIS: NORMAL
OVALOCYTES BLD QL SMEAR: ABNORMAL
PATH REV BLD -IMP: NORMAL
PATH REV BLD -IMP: NORMAL
PATHOLOGIST INTERPRETATION AB/XM: NORMAL
PCO2 BLDA: 39.5 MMHG (ref 35–45)
PEEP: 5
PH SMN: 7.34 [PH] (ref 7.35–7.45)
PH UR STRIP: 7 [PH] (ref 5–8)
PH UR STRIP: 8 [PH] (ref 5–8)
PHOSPHATE SERPL-MCNC: 2.8 MG/DL
PHOSPHATE SERPL-MCNC: 3.3 MG/DL
PHOSPHATE SERPL-MCNC: 3.6 MG/DL
PHOSPHATE SERPL-MCNC: 3.6 MG/DL
PIP: 21
PLATELET # BLD AUTO: 11 K/UL
PLATELET # BLD AUTO: 15 K/UL
PLATELET # BLD AUTO: 16 K/UL
PLATELET # BLD AUTO: 2 K/UL
PLATELET # BLD AUTO: 3 K/UL
PLATELET # BLD AUTO: 4 K/UL
PLATELET # BLD AUTO: 5 K/UL
PLATELET # BLD AUTO: 6 K/UL
PLATELET # BLD AUTO: 7 K/UL
PLATELET # BLD AUTO: 8 K/UL
PLATELET # BLD AUTO: 8 K/UL
PLATELET AB SER QL: NORMAL
PLATELET BLD QL SMEAR: ABNORMAL
PMV BLD AUTO: 11 FL
PMV BLD AUTO: 11.1 FL
PMV BLD AUTO: 13.6 FL
PMV BLD AUTO: 14.2 FL
PMV BLD AUTO: 8.6 FL
PMV BLD AUTO: 9.4 FL
PMV BLD AUTO: ABNORMAL FL
PO2 BLDA: 154 MMHG (ref 80–100)
POC BE: -4 MMOL/L
POC IONIZED CALCIUM: 0.89 MMOL/L (ref 1.06–1.42)
POC SATURATED O2: 99 % (ref 95–100)
POC TCO2 (MEASURED): 21 MMOL/L (ref 23–29)
POC TCO2: 23 MMOL/L (ref 23–27)
POCT GLUCOSE: 102 MG/DL (ref 70–110)
POCT GLUCOSE: 166 MG/DL (ref 70–110)
POIKILOCYTOSIS BLD QL SMEAR: SLIGHT
POLYCHROMASIA BLD QL SMEAR: ABNORMAL
POTASSIUM BLD-SCNC: 3.5 MMOL/L (ref 3.5–5.1)
POTASSIUM SERPL-SCNC: 2.9 MMOL/L
POTASSIUM SERPL-SCNC: 3 MMOL/L
POTASSIUM SERPL-SCNC: 3.1 MMOL/L
POTASSIUM SERPL-SCNC: 3.3 MMOL/L
POTASSIUM SERPL-SCNC: 3.4 MMOL/L
POTASSIUM SERPL-SCNC: 3.5 MMOL/L
POTASSIUM SERPL-SCNC: 3.7 MMOL/L
POTASSIUM SERPL-SCNC: 3.8 MMOL/L
POTASSIUM SERPL-SCNC: 4.2 MMOL/L
POTASSIUM SERPL-SCNC: 4.3 MMOL/L
PROMYELOCYTES NFR BLD MANUAL: 1 %
PROMYELOCYTES NFR BLD MANUAL: 1 %
PROMYELOCYTES NFR BLD MANUAL: 10 %
PROMYELOCYTES NFR BLD MANUAL: 2 %
PROT SERPL-MCNC: 4.9 G/DL
PROT SERPL-MCNC: 5.7 G/DL
PROT SERPL-MCNC: 6.4 G/DL
PROT SERPL-MCNC: 6.5 G/DL
PROT SERPL-MCNC: 6.9 G/DL
PROT SERPL-MCNC: 7 G/DL
PROT SERPL-MCNC: 7.2 G/DL
PROT UR QL STRIP: NEGATIVE
PROT UR QL STRIP: NEGATIVE
PROTHROMBIN TIME: 11 SEC
PROTHROMBIN TIME: 11.5 SEC
PROTHROMBIN TIME: 11.6 SEC
PROTHROMBIN TIME: 11.7 SEC
PROTHROMBIN TIME: 12 SEC
RBC # BLD AUTO: 1.39 M/UL
RBC # BLD AUTO: 1.42 M/UL
RBC # BLD AUTO: 1.49 M/UL
RBC # BLD AUTO: 1.52 M/UL
RBC # BLD AUTO: 1.64 M/UL
RBC # BLD AUTO: 1.64 M/UL
RBC # BLD AUTO: 1.83 M/UL
RBC # BLD AUTO: 1.84 M/UL
RBC # BLD AUTO: 1.87 M/UL
RBC # BLD AUTO: 2.07 M/UL
RBC # BLD AUTO: 2.15 M/UL
RBC # BLD AUTO: 2.17 M/UL
RBC # BLD AUTO: 2.19 M/UL
RBC # BLD AUTO: 2.25 M/UL
RBC # BLD AUTO: 2.31 M/UL
RBC # BLD AUTO: 2.35 M/UL
RBC # BLD AUTO: 2.41 M/UL
RBC # BLD AUTO: 2.54 M/UL
RBC # BLD AUTO: 2.55 M/UL
RBC # BLD AUTO: 2.55 M/UL
RBC # BLD AUTO: 2.9 M/UL
RBC # BLD AUTO: 2.95 M/UL
RBC # BLD AUTO: 3.18 M/UL
RBC # BLD AUTO: 3.21 M/UL
RBC #/AREA URNS AUTO: 0 /HPF (ref 0–4)
RBC #/AREA URNS AUTO: 5 /HPF (ref 0–4)
REASON FOR REFERRAL (NARRATIVE): NORMAL
REF LAB TEST METHOD: NORMAL
RETICS/RBC NFR AUTO: 1 %
SAMPLE: ABNORMAL
SAMPLE: ABNORMAL
SITE: ABNORMAL
SMUDGE CELLS BLD QL SMEAR: PRESENT
SODIUM BLD-SCNC: 131 MMOL/L (ref 136–145)
SODIUM SERPL-SCNC: 127 MMOL/L
SODIUM SERPL-SCNC: 132 MMOL/L
SODIUM SERPL-SCNC: 134 MMOL/L
SODIUM SERPL-SCNC: 135 MMOL/L
SODIUM SERPL-SCNC: 135 MMOL/L
SODIUM SERPL-SCNC: 136 MMOL/L
SODIUM SERPL-SCNC: 137 MMOL/L
SODIUM SERPL-SCNC: 138 MMOL/L
SP GR UR STRIP: 1.01 (ref 1–1.03)
SP GR UR STRIP: >=1.03 (ref 1–1.03)
SP02: 100
SPECIMEN SOURCE: NORMAL
SPECIMEN: NORMAL
SPHEROCYTES BLD QL SMEAR: ABNORMAL
SQUAMOUS #/AREA URNS AUTO: 2 /HPF
TRIGL SERPL-MCNC: 99 MG/DL
TROPONIN I SERPL DL<=0.01 NG/ML-MCNC: 0.02 NG/ML
TROPONIN I SERPL DL<=0.01 NG/ML-MCNC: <0.006 NG/ML
TSH SERPL DL<=0.005 MIU/L-ACNC: 2.4 UIU/ML
UNIT NUMBER: NORMAL
URN SPEC COLLECT METH UR: ABNORMAL
URN SPEC COLLECT METH UR: ABNORMAL
UROBILINOGEN UR STRIP-ACNC: 4 EU/DL
UROBILINOGEN UR STRIP-ACNC: NEGATIVE EU/DL
VT: 450
WBC # BLD AUTO: 0.18 K/UL
WBC # BLD AUTO: 0.37 K/UL
WBC # BLD AUTO: 0.97 K/UL
WBC # BLD AUTO: 2.36 K/UL
WBC # BLD AUTO: 2.91 K/UL
WBC # BLD AUTO: 3.09 K/UL
WBC # BLD AUTO: 3.33 K/UL
WBC # BLD AUTO: 3.59 K/UL
WBC # BLD AUTO: 3.66 K/UL
WBC # BLD AUTO: 3.78 K/UL
WBC # BLD AUTO: 3.79 K/UL
WBC # BLD AUTO: 3.81 K/UL
WBC # BLD AUTO: 3.89 K/UL
WBC # BLD AUTO: 3.95 K/UL
WBC # BLD AUTO: 4.08 K/UL
WBC # BLD AUTO: 4.15 K/UL
WBC # BLD AUTO: 4.16 K/UL
WBC # BLD AUTO: 4.26 K/UL
WBC # BLD AUTO: 4.91 K/UL
WBC # BLD AUTO: 5.31 K/UL
WBC # BLD AUTO: 5.61 K/UL
WBC # BLD AUTO: 5.73 K/UL
WBC # BLD AUTO: 6.28 K/UL
WBC # BLD AUTO: 8.54 K/UL
WBC #/AREA URNS AUTO: 0 /HPF (ref 0–5)
WBC #/AREA URNS AUTO: 1 /HPF (ref 0–5)
WBC OTHER NFR BLD MANUAL: 3 %
WBC OTHER NFR BLD MANUAL: 5 %
WBC OTHER NFR BLD MANUAL: 5 %

## 2018-01-01 PROCEDURE — 36415 COLL VENOUS BLD VENIPUNCTURE: CPT

## 2018-01-01 PROCEDURE — 94002 VENT MGMT INPAT INIT DAY: CPT

## 2018-01-01 PROCEDURE — 86870 RBC ANTIBODY IDENTIFICATION: CPT

## 2018-01-01 PROCEDURE — 80053 COMPREHEN METABOLIC PANEL: CPT

## 2018-01-01 PROCEDURE — S0073 INJECTION, AZTREONAM, 500 MG: HCPCS | Performed by: INTERNAL MEDICINE

## 2018-01-01 PROCEDURE — P9040 RBC LEUKOREDUCED IRRADIATED: HCPCS

## 2018-01-01 PROCEDURE — P9037 PLATE PHERES LEUKOREDU IRRAD: HCPCS

## 2018-01-01 PROCEDURE — 36430 TRANSFUSION BLD/BLD COMPNT: CPT

## 2018-01-01 PROCEDURE — 85007 BL SMEAR W/DIFF WBC COUNT: CPT

## 2018-01-01 PROCEDURE — 84484 ASSAY OF TROPONIN QUANT: CPT | Mod: 91

## 2018-01-01 PROCEDURE — 85027 COMPLETE CBC AUTOMATED: CPT

## 2018-01-01 PROCEDURE — 25000003 PHARM REV CODE 250: Performed by: EMERGENCY MEDICINE

## 2018-01-01 PROCEDURE — 63600175 PHARM REV CODE 636 W HCPCS: Performed by: STUDENT IN AN ORGANIZED HEALTH CARE EDUCATION/TRAINING PROGRAM

## 2018-01-01 PROCEDURE — 85027 COMPLETE CBC AUTOMATED: CPT | Mod: 91

## 2018-01-01 PROCEDURE — P9038 RBC IRRADIATED: HCPCS

## 2018-01-01 PROCEDURE — 25000003 PHARM REV CODE 250: Performed by: STUDENT IN AN ORGANIZED HEALTH CARE EDUCATION/TRAINING PROGRAM

## 2018-01-01 PROCEDURE — 99284 EMERGENCY DEPT VISIT MOD MDM: CPT | Mod: 25

## 2018-01-01 PROCEDURE — 83735 ASSAY OF MAGNESIUM: CPT

## 2018-01-01 PROCEDURE — 80048 BASIC METABOLIC PNL TOTAL CA: CPT

## 2018-01-01 PROCEDURE — 25000003 PHARM REV CODE 250: Performed by: INTERNAL MEDICINE

## 2018-01-01 PROCEDURE — 88342 IMHCHEM/IMCYTCHM 1ST ANTB: CPT | Performed by: PATHOLOGY

## 2018-01-01 PROCEDURE — 86644 CMV ANTIBODY: CPT

## 2018-01-01 PROCEDURE — 5A1935Z RESPIRATORY VENTILATION, LESS THAN 24 CONSECUTIVE HOURS: ICD-10-PCS | Performed by: EMERGENCY MEDICINE

## 2018-01-01 PROCEDURE — 63600175 PHARM REV CODE 636 W HCPCS: Performed by: NURSE PRACTITIONER

## 2018-01-01 PROCEDURE — 86922 COMPATIBILITY TEST ANTIGLOB: CPT

## 2018-01-01 PROCEDURE — 96413 CHEMO IV INFUSION 1 HR: CPT

## 2018-01-01 PROCEDURE — 80053 COMPREHEN METABOLIC PANEL: CPT | Mod: 91

## 2018-01-01 PROCEDURE — 99233 SBSQ HOSP IP/OBS HIGH 50: CPT | Mod: ,,, | Performed by: INTERNAL MEDICINE

## 2018-01-01 PROCEDURE — 63600175 PHARM REV CODE 636 W HCPCS: Performed by: INTERNAL MEDICINE

## 2018-01-01 PROCEDURE — 99215 OFFICE O/P EST HI 40 MIN: CPT | Mod: S$GLB,,, | Performed by: INTERNAL MEDICINE

## 2018-01-01 PROCEDURE — 96375 TX/PRO/DX INJ NEW DRUG ADDON: CPT | Mod: 59

## 2018-01-01 PROCEDURE — 84100 ASSAY OF PHOSPHORUS: CPT

## 2018-01-01 PROCEDURE — 85730 THROMBOPLASTIN TIME PARTIAL: CPT

## 2018-01-01 PROCEDURE — 86850 RBC ANTIBODY SCREEN: CPT

## 2018-01-01 PROCEDURE — 86902 BLOOD TYPE ANTIGEN DONOR EA: CPT

## 2018-01-01 PROCEDURE — 25000003 PHARM REV CODE 250: Performed by: NURSE PRACTITIONER

## 2018-01-01 PROCEDURE — 88305 TISSUE EXAM BY PATHOLOGIST: CPT | Mod: 26,,, | Performed by: PATHOLOGY

## 2018-01-01 PROCEDURE — 84484 ASSAY OF TROPONIN QUANT: CPT

## 2018-01-01 PROCEDURE — 3078F DIAST BP <80 MM HG: CPT | Mod: CPTII,S$GLB,, | Performed by: INTERNAL MEDICINE

## 2018-01-01 PROCEDURE — 85025 COMPLETE CBC W/AUTO DIFF WBC: CPT

## 2018-01-01 PROCEDURE — A4216 STERILE WATER/SALINE, 10 ML: HCPCS | Performed by: NURSE PRACTITIONER

## 2018-01-01 PROCEDURE — 0BH17EZ INSERTION OF ENDOTRACHEAL AIRWAY INTO TRACHEA, VIA NATURAL OR ARTIFICIAL OPENING: ICD-10-PCS | Performed by: EMERGENCY MEDICINE

## 2018-01-01 PROCEDURE — 96367 TX/PROPH/DG ADDL SEQ IV INF: CPT

## 2018-01-01 PROCEDURE — 80048 BASIC METABOLIC PNL TOTAL CA: CPT | Mod: 91

## 2018-01-01 PROCEDURE — 88342 IMHCHEM/IMCYTCHM 1ST ANTB: CPT | Mod: 26,59,, | Performed by: PATHOLOGY

## 2018-01-01 PROCEDURE — 88313 SPECIAL STAINS GROUP 2: CPT

## 2018-01-01 PROCEDURE — 94761 N-INVAS EAR/PLS OXIMETRY MLT: CPT

## 2018-01-01 PROCEDURE — 82550 ASSAY OF CK (CPK): CPT

## 2018-01-01 PROCEDURE — 27201040 HC RC 50 FILTER

## 2018-01-01 PROCEDURE — 99291 CRITICAL CARE FIRST HOUR: CPT | Mod: 25,,, | Performed by: NURSE PRACTITIONER

## 2018-01-01 PROCEDURE — 88184 FLOWCYTOMETRY/ TC 1 MARKER: CPT | Performed by: PATHOLOGY

## 2018-01-01 PROCEDURE — 07DR3ZX EXTRACTION OF ILIAC BONE MARROW, PERCUTANEOUS APPROACH, DIAGNOSTIC: ICD-10-PCS | Performed by: INTERNAL MEDICINE

## 2018-01-01 PROCEDURE — 88305 TISSUE EXAM BY PATHOLOGIST: CPT | Performed by: PATHOLOGY

## 2018-01-01 PROCEDURE — 20600001 HC STEP DOWN PRIVATE ROOM

## 2018-01-01 PROCEDURE — 99234 HOSP IP/OBS SM DT SF/LOW 45: CPT | Mod: ,,, | Performed by: INTERNAL MEDICINE

## 2018-01-01 PROCEDURE — 63600175 PHARM REV CODE 636 W HCPCS

## 2018-01-01 PROCEDURE — 25000003 PHARM REV CODE 250: Performed by: PHYSICIAN ASSISTANT

## 2018-01-01 PROCEDURE — 99215 OFFICE O/P EST HI 40 MIN: CPT | Mod: S$PBB,,, | Performed by: INTERNAL MEDICINE

## 2018-01-01 PROCEDURE — 99284 EMERGENCY DEPT VISIT MOD MDM: CPT | Mod: ,,, | Performed by: EMERGENCY MEDICINE

## 2018-01-01 PROCEDURE — 25000003 PHARM REV CODE 250: Performed by: FAMILY MEDICINE

## 2018-01-01 PROCEDURE — 99285 EMERGENCY DEPT VISIT HI MDM: CPT | Mod: ,,, | Performed by: EMERGENCY MEDICINE

## 2018-01-01 PROCEDURE — 99233 SBSQ HOSP IP/OBS HIGH 50: CPT | Mod: ,,, | Performed by: HOSPITALIST

## 2018-01-01 PROCEDURE — 88311 DECALCIFY TISSUE: CPT | Mod: 26,,, | Performed by: PATHOLOGY

## 2018-01-01 PROCEDURE — 88275 CYTOGENETICS 100-300: CPT | Mod: 59

## 2018-01-01 PROCEDURE — 85610 PROTHROMBIN TIME: CPT

## 2018-01-01 PROCEDURE — 27100171 HC OXYGEN HIGH FLOW UP TO 24 HOURS

## 2018-01-01 PROCEDURE — 85060 BLOOD SMEAR INTERPRETATION: CPT | Mod: ,,, | Performed by: PATHOLOGY

## 2018-01-01 PROCEDURE — 88185 FLOWCYTOMETRY/TC ADD-ON: CPT | Mod: 59 | Performed by: PATHOLOGY

## 2018-01-01 PROCEDURE — 31500 INSERT EMERGENCY AIRWAY: CPT

## 2018-01-01 PROCEDURE — 88341 IMHCHEM/IMCYTCHM EA ADD ANTB: CPT | Performed by: PATHOLOGY

## 2018-01-01 PROCEDURE — 25000003 PHARM REV CODE 250: Performed by: PSYCHIATRY & NEUROLOGY

## 2018-01-01 PROCEDURE — 99223 1ST HOSP IP/OBS HIGH 75: CPT | Mod: ,,, | Performed by: HOSPITALIST

## 2018-01-01 PROCEDURE — 86901 BLOOD TYPING SEROLOGIC RH(D): CPT

## 2018-01-01 PROCEDURE — 63600175 PHARM REV CODE 636 W HCPCS: Performed by: EMERGENCY MEDICINE

## 2018-01-01 PROCEDURE — 63600175 PHARM REV CODE 636 W HCPCS: Mod: JG | Performed by: INTERNAL MEDICINE

## 2018-01-01 PROCEDURE — 99999 PR PBB SHADOW E&M-EST. PATIENT-LVL III: CPT | Mod: PBBFAC,,, | Performed by: INTERNAL MEDICINE

## 2018-01-01 PROCEDURE — 99239 HOSP IP/OBS DSCHRG MGMT >30: CPT | Mod: ,,, | Performed by: INTERNAL MEDICINE

## 2018-01-01 PROCEDURE — 81001 URINALYSIS AUTO W/SCOPE: CPT

## 2018-01-01 PROCEDURE — 88237 TISSUE CULTURE BONE MARROW: CPT

## 2018-01-01 PROCEDURE — 96360 HYDRATION IV INFUSION INIT: CPT

## 2018-01-01 PROCEDURE — 83036 HEMOGLOBIN GLYCOSYLATED A1C: CPT

## 2018-01-01 PROCEDURE — 85007 BL SMEAR W/DIFF WBC COUNT: CPT | Mod: 91

## 2018-01-01 PROCEDURE — 3077F SYST BP >= 140 MM HG: CPT | Mod: CPTII,S$GLB,, | Performed by: INTERNAL MEDICINE

## 2018-01-01 PROCEDURE — 36620 INSERTION CATHETER ARTERY: CPT | Mod: ,,, | Performed by: PHYSICIAN ASSISTANT

## 2018-01-01 PROCEDURE — 84100 ASSAY OF PHOSPHORUS: CPT | Mod: 91

## 2018-01-01 PROCEDURE — 84443 ASSAY THYROID STIM HORMONE: CPT

## 2018-01-01 PROCEDURE — 99291 CRITICAL CARE FIRST HOUR: CPT | Mod: 25,,, | Performed by: EMERGENCY MEDICINE

## 2018-01-01 PROCEDURE — 82803 BLOOD GASES ANY COMBINATION: CPT

## 2018-01-01 PROCEDURE — 86038 ANTINUCLEAR ANTIBODIES: CPT

## 2018-01-01 PROCEDURE — 27201040 HC RC 50 FILTER: Mod: 91

## 2018-01-01 PROCEDURE — 38221 DX BONE MARROW BIOPSIES: CPT

## 2018-01-01 PROCEDURE — 86140 C-REACTIVE PROTEIN: CPT

## 2018-01-01 PROCEDURE — 86022 PLATELET ANTIBODIES: CPT

## 2018-01-01 PROCEDURE — 96365 THER/PROPH/DIAG IV INF INIT: CPT | Mod: 59

## 2018-01-01 PROCEDURE — 3079F DIAST BP 80-89 MM HG: CPT | Mod: CPTII,S$GLB,, | Performed by: INTERNAL MEDICINE

## 2018-01-01 PROCEDURE — 85045 AUTOMATED RETICULOCYTE COUNT: CPT

## 2018-01-01 PROCEDURE — 38221 DX BONE MARROW BIOPSIES: CPT | Mod: LT,,, | Performed by: INTERNAL MEDICINE

## 2018-01-01 PROCEDURE — 20000000 HC ICU ROOM

## 2018-01-01 PROCEDURE — 99223 1ST HOSP IP/OBS HIGH 75: CPT | Mod: ,,, | Performed by: PSYCHIATRY & NEUROLOGY

## 2018-01-01 PROCEDURE — P9035 PLATELET PHERES LEUKOREDUCED: HCPCS

## 2018-01-01 PROCEDURE — 85651 RBC SED RATE NONAUTOMATED: CPT

## 2018-01-01 PROCEDURE — 83010 ASSAY OF HAPTOGLOBIN QUANT: CPT

## 2018-01-01 PROCEDURE — 63600175 PHARM REV CODE 636 W HCPCS: Performed by: RADIOLOGY

## 2018-01-01 PROCEDURE — 82553 CREATINE MB FRACTION: CPT

## 2018-01-01 PROCEDURE — 85610 PROTHROMBIN TIME: CPT | Mod: 91

## 2018-01-01 PROCEDURE — 83735 ASSAY OF MAGNESIUM: CPT | Mod: 91

## 2018-01-01 PROCEDURE — 25500020 PHARM REV CODE 255: Performed by: EMERGENCY MEDICINE

## 2018-01-01 PROCEDURE — 88189 FLOWCYTOMETRY/READ 16 & >: CPT | Mod: ,,, | Performed by: PATHOLOGY

## 2018-01-01 PROCEDURE — 88271 CYTOGENETICS DNA PROBE: CPT | Mod: 59

## 2018-01-01 PROCEDURE — 88341 IMHCHEM/IMCYTCHM EA ADD ANTB: CPT | Mod: 26,59,, | Performed by: PATHOLOGY

## 2018-01-01 PROCEDURE — 93005 ELECTROCARDIOGRAM TRACING: CPT

## 2018-01-01 PROCEDURE — 99238 HOSP IP/OBS DSCHRG MGMT 30/<: CPT | Mod: ,,, | Performed by: HOSPITALIST

## 2018-01-01 PROCEDURE — 99291 CRITICAL CARE FIRST HOUR: CPT | Mod: 25

## 2018-01-01 PROCEDURE — 99999 PR PBB SHADOW E&M-EST. PATIENT-LVL I: CPT | Mod: PBBFAC,,, | Performed by: INTERNAL MEDICINE

## 2018-01-01 PROCEDURE — 88313 SPECIAL STAINS GROUP 2: CPT | Mod: 26,,, | Performed by: PATHOLOGY

## 2018-01-01 PROCEDURE — 80061 LIPID PANEL: CPT

## 2018-01-01 PROCEDURE — 3074F SYST BP LT 130 MM HG: CPT | Mod: CPTII,S$GLB,, | Performed by: INTERNAL MEDICINE

## 2018-01-01 PROCEDURE — 83615 LACTATE (LD) (LDH) ENZYME: CPT

## 2018-01-01 PROCEDURE — 93010 ELECTROCARDIOGRAM REPORT: CPT | Mod: ,,, | Performed by: INTERNAL MEDICINE

## 2018-01-01 PROCEDURE — 31500 INSERT EMERGENCY AIRWAY: CPT | Mod: ,,, | Performed by: EMERGENCY MEDICINE

## 2018-01-01 PROCEDURE — 88264 CHROMOSOME ANALYSIS 20-25: CPT

## 2018-01-01 PROCEDURE — 99285 EMERGENCY DEPT VISIT HI MDM: CPT | Mod: 25

## 2018-01-01 PROCEDURE — 85097 BONE MARROW INTERPRETATION: CPT | Mod: ,,, | Performed by: PATHOLOGY

## 2018-01-01 PROCEDURE — 85730 THROMBOPLASTIN TIME PARTIAL: CPT | Mod: 91

## 2018-01-01 PROCEDURE — 86077 PHYS BLOOD BANK SERV XMATCH: CPT | Mod: ,,, | Performed by: PATHOLOGY

## 2018-01-01 PROCEDURE — 99255 IP/OBS CONSLTJ NEW/EST HI 80: CPT | Mod: ,,, | Performed by: INTERNAL MEDICINE

## 2018-01-01 PROCEDURE — 86880 COOMBS TEST DIRECT: CPT

## 2018-01-01 PROCEDURE — 87040 BLOOD CULTURE FOR BACTERIA: CPT | Mod: 59

## 2018-01-01 PROCEDURE — 37799 UNLISTED PX VASCULAR SURGERY: CPT

## 2018-01-01 PROCEDURE — 63600175 PHARM REV CODE 636 W HCPCS: Performed by: FAMILY MEDICINE

## 2018-01-01 RX ORDER — SODIUM CHLORIDE 0.9 % (FLUSH) 0.9 %
10 SYRINGE (ML) INJECTION
Status: CANCELLED | OUTPATIENT
Start: 2018-01-01

## 2018-01-01 RX ORDER — HYDROCODONE BITARTRATE AND ACETAMINOPHEN 500; 5 MG/1; MG/1
TABLET ORAL ONCE
Status: DISCONTINUED | OUTPATIENT
Start: 2018-01-01 | End: 2018-01-01 | Stop reason: HOSPADM

## 2018-01-01 RX ORDER — ACETAMINOPHEN 325 MG/1
650 TABLET ORAL
Status: COMPLETED | OUTPATIENT
Start: 2018-01-01 | End: 2018-01-01

## 2018-01-01 RX ORDER — ONDANSETRON 2 MG/ML
1 INJECTION INTRAMUSCULAR; INTRAVENOUS
Status: DISCONTINUED | OUTPATIENT
Start: 2018-01-01 | End: 2018-01-01

## 2018-01-01 RX ORDER — ONDANSETRON HCL IN 0.9 % NACL 8 MG/50 ML
8 INTRAVENOUS SOLUTION, PIGGYBACK (ML) INTRAVENOUS
Status: COMPLETED | OUTPATIENT
Start: 2018-01-01 | End: 2018-01-01

## 2018-01-01 RX ORDER — ACETAMINOPHEN 325 MG/1
325 TABLET ORAL 2 TIMES DAILY
Status: DISCONTINUED | OUTPATIENT
Start: 2018-01-01 | End: 2018-01-01

## 2018-01-01 RX ORDER — HYDROCODONE BITARTRATE AND ACETAMINOPHEN 500; 5 MG/1; MG/1
TABLET ORAL
Status: DISCONTINUED | OUTPATIENT
Start: 2018-01-01 | End: 2018-01-01

## 2018-01-01 RX ORDER — HYDROCODONE BITARTRATE AND ACETAMINOPHEN 500; 5 MG/1; MG/1
TABLET ORAL
Status: DISCONTINUED | OUTPATIENT
Start: 2018-01-01 | End: 2018-01-01 | Stop reason: HOSPADM

## 2018-01-01 RX ORDER — HEPARIN 100 UNIT/ML
500 SYRINGE INTRAVENOUS
Status: DISCONTINUED | OUTPATIENT
Start: 2018-01-01 | End: 2018-01-01 | Stop reason: HOSPADM

## 2018-01-01 RX ORDER — HEPARIN 100 UNIT/ML
500 SYRINGE INTRAVENOUS
Status: CANCELLED | OUTPATIENT
Start: 2018-01-01

## 2018-01-01 RX ORDER — HYDROCODONE BITARTRATE AND ACETAMINOPHEN 500; 5 MG/1; MG/1
TABLET ORAL ONCE
Status: COMPLETED | OUTPATIENT
Start: 2018-01-01 | End: 2018-01-01

## 2018-01-01 RX ORDER — TRAMADOL HYDROCHLORIDE 50 MG/1
50 TABLET ORAL EVERY 6 HOURS PRN
Qty: 30 TABLET | Refills: 0 | Status: SHIPPED | OUTPATIENT
Start: 2018-01-01 | End: 2018-01-01

## 2018-01-01 RX ORDER — HYDROCODONE BITARTRATE AND ACETAMINOPHEN 500; 5 MG/1; MG/1
TABLET ORAL ONCE
Status: CANCELLED | OUTPATIENT
Start: 2018-01-01 | End: 2018-01-01

## 2018-01-01 RX ORDER — DIPHENHYDRAMINE HYDROCHLORIDE 50 MG/ML
50 INJECTION INTRAMUSCULAR; INTRAVENOUS
Status: COMPLETED | OUTPATIENT
Start: 2018-01-01 | End: 2018-01-01

## 2018-01-01 RX ORDER — APREPITANT 40 MG/1
80 CAPSULE ORAL DAILY
Qty: 60 CAPSULE | Refills: 0 | Status: SHIPPED | OUTPATIENT
Start: 2018-01-01

## 2018-01-01 RX ORDER — AZACITIDINE 100 MG/1
75 INJECTION, POWDER, LYOPHILIZED, FOR SOLUTION INTRAVENOUS; SUBCUTANEOUS
Status: CANCELLED | OUTPATIENT
Start: 2018-01-01 | End: 2018-01-01

## 2018-01-01 RX ORDER — RAMELTEON 8 MG/1
8 TABLET ORAL NIGHTLY PRN
Status: DISCONTINUED | OUTPATIENT
Start: 2018-01-01 | End: 2018-01-01 | Stop reason: HOSPADM

## 2018-01-01 RX ORDER — POTASSIUM CHLORIDE 750 MG/1
40 CAPSULE, EXTENDED RELEASE ORAL DAILY
Qty: 120 CAPSULE | Refills: 0 | Status: SHIPPED | OUTPATIENT
Start: 2018-01-01 | End: 2018-05-13

## 2018-01-01 RX ORDER — AMOXICILLIN 250 MG
1 CAPSULE ORAL DAILY PRN
Status: DISCONTINUED | OUTPATIENT
Start: 2018-01-01 | End: 2018-01-01 | Stop reason: HOSPADM

## 2018-01-01 RX ORDER — SODIUM CHLORIDE 0.9 % (FLUSH) 0.9 %
5 SYRINGE (ML) INJECTION
Status: DISCONTINUED | OUTPATIENT
Start: 2018-01-01 | End: 2018-01-01 | Stop reason: HOSPADM

## 2018-01-01 RX ORDER — PROMETHAZINE HYDROCHLORIDE 12.5 MG/1
12.5 TABLET ORAL 4 TIMES DAILY
Qty: 60 TABLET | Refills: 3 | Status: SHIPPED | OUTPATIENT
Start: 2018-01-01

## 2018-01-01 RX ORDER — SUCCINYLCHOLINE CHLORIDE 20 MG/ML
INJECTION INTRAMUSCULAR; INTRAVENOUS
Status: COMPLETED
Start: 2018-01-01 | End: 2018-01-01

## 2018-01-01 RX ORDER — ACETAMINOPHEN 325 MG/1
650 TABLET ORAL
Status: CANCELLED | OUTPATIENT
Start: 2018-01-01

## 2018-01-01 RX ORDER — AMINOCAPROIC ACID 1000 MG/1
2000 TABLET ORAL EVERY 8 HOURS
Qty: 180 EACH | Refills: 1 | Status: SHIPPED | OUTPATIENT
Start: 2018-01-01 | End: 2018-01-01

## 2018-01-01 RX ORDER — DEXTROSE 50 % IN WATER (D50W) INTRAVENOUS SYRINGE
25
Status: DISCONTINUED | OUTPATIENT
Start: 2018-01-01 | End: 2018-01-01

## 2018-01-01 RX ORDER — AMINOCAPROIC ACID 1000 MG/1
2000 TABLET ORAL EVERY 8 HOURS
Qty: 180 EACH | Refills: 3 | Status: SHIPPED | OUTPATIENT
Start: 2018-01-01 | End: 2018-06-22

## 2018-01-01 RX ORDER — SODIUM CHLORIDE 0.9 % (FLUSH) 0.9 %
10 SYRINGE (ML) INJECTION
Status: DISCONTINUED | OUTPATIENT
Start: 2018-01-01 | End: 2018-01-01 | Stop reason: HOSPADM

## 2018-01-01 RX ORDER — DEXTROSE 50 % IN WATER (D50W) INTRAVENOUS SYRINGE
25 ONCE
Status: DISCONTINUED | OUTPATIENT
Start: 2018-01-01 | End: 2018-01-01

## 2018-01-01 RX ORDER — NEBIVOLOL 5 MG/1
5 TABLET ORAL DAILY
Status: ON HOLD | COMMUNITY
End: 2018-01-01

## 2018-01-01 RX ORDER — LORAZEPAM 2 MG/ML
1 INJECTION INTRAMUSCULAR
Status: DISCONTINUED | OUTPATIENT
Start: 2018-01-01 | End: 2018-01-01 | Stop reason: HOSPADM

## 2018-01-01 RX ORDER — POTASSIUM CHLORIDE 20 MEQ/1
40 TABLET, EXTENDED RELEASE ORAL DAILY
Status: DISCONTINUED | OUTPATIENT
Start: 2018-01-01 | End: 2018-01-01

## 2018-01-01 RX ORDER — NICARDIPINE HYDROCHLORIDE 0.2 MG/ML
INJECTION INTRAVENOUS
Status: DISCONTINUED
Start: 2018-01-01 | End: 2018-01-01 | Stop reason: HOSPADM

## 2018-01-01 RX ORDER — HYDROCHLOROTHIAZIDE 12.5 MG/1
12.5 CAPSULE ORAL DAILY
Status: ON HOLD | COMMUNITY
End: 2018-01-01

## 2018-01-01 RX ORDER — TRAMADOL HYDROCHLORIDE 50 MG/1
50 TABLET ORAL EVERY 6 HOURS PRN
Status: DISCONTINUED | OUTPATIENT
Start: 2018-01-01 | End: 2018-01-01 | Stop reason: HOSPADM

## 2018-01-01 RX ORDER — DIPHENHYDRAMINE HCL 25 MG
25 CAPSULE ORAL
Status: COMPLETED | OUTPATIENT
Start: 2018-01-01 | End: 2018-01-01

## 2018-01-01 RX ORDER — ALBUTEROL SULFATE 90 UG/1
1 AEROSOL, METERED RESPIRATORY (INHALATION) EVERY 6 HOURS PRN
COMMUNITY

## 2018-01-01 RX ORDER — PROMETHAZINE HYDROCHLORIDE 25 MG/1
25 TABLET ORAL EVERY 6 HOURS PRN
Status: DISCONTINUED | OUTPATIENT
Start: 2018-01-01 | End: 2018-01-01 | Stop reason: HOSPADM

## 2018-01-01 RX ORDER — VANCOMYCIN/0.9 % SOD CHLORIDE 1 G/100 ML
1000 PLASTIC BAG, INJECTION (ML) INTRAVENOUS
Status: COMPLETED | OUTPATIENT
Start: 2018-01-01 | End: 2018-01-01

## 2018-01-01 RX ORDER — AMINOCAPROIC ACID 1000 MG/1
2000 TABLET ORAL EVERY 8 HOURS
Qty: 180 EACH | Refills: 1 | Status: SHIPPED | OUTPATIENT
Start: 2018-01-01 | End: 2018-01-01 | Stop reason: SDUPTHER

## 2018-01-01 RX ORDER — FENTANYL CITRATE 50 UG/ML
50 INJECTION, SOLUTION INTRAMUSCULAR; INTRAVENOUS
Status: DISCONTINUED | OUTPATIENT
Start: 2018-01-01 | End: 2018-01-01 | Stop reason: HOSPADM

## 2018-01-01 RX ORDER — ALBUTEROL SULFATE 90 UG/1
1 AEROSOL, METERED RESPIRATORY (INHALATION) EVERY 6 HOURS PRN
Status: DISCONTINUED | OUTPATIENT
Start: 2018-01-01 | End: 2018-01-01 | Stop reason: HOSPADM

## 2018-01-01 RX ORDER — MANNITOL 250 MG/ML
INJECTION, SOLUTION INTRAVENOUS
Status: COMPLETED
Start: 2018-01-01 | End: 2018-01-01

## 2018-01-01 RX ORDER — ONDANSETRON 2 MG/ML
1 INJECTION INTRAMUSCULAR; INTRAVENOUS ONCE
Status: DISCONTINUED | OUTPATIENT
Start: 2018-01-01 | End: 2018-01-01

## 2018-01-01 RX ORDER — AMINOCAPROIC ACID 500 MG/1
2000 TABLET ORAL EVERY 8 HOURS
Status: DISCONTINUED | OUTPATIENT
Start: 2018-01-01 | End: 2018-01-01

## 2018-01-01 RX ORDER — AMINOCAPROIC ACID 1000 MG/1
2000 TABLET ORAL EVERY 8 HOURS
Qty: 90 EACH | Refills: 1 | Status: SHIPPED | OUTPATIENT
Start: 2018-01-01 | End: 2018-01-01

## 2018-01-01 RX ORDER — LEVETIRACETAM 5 MG/ML
1000 INJECTION INTRAVASCULAR ONCE
Status: COMPLETED | OUTPATIENT
Start: 2018-01-01 | End: 2018-01-01

## 2018-01-01 RX ORDER — FAMOTIDINE 20 MG/1
20 TABLET, FILM COATED ORAL 2 TIMES DAILY
Status: DISCONTINUED | OUTPATIENT
Start: 2018-01-01 | End: 2018-01-01

## 2018-01-01 RX ORDER — SUCCINYLCHOLINE CHLORIDE 20 MG/ML
100 INJECTION INTRAMUSCULAR; INTRAVENOUS
Status: COMPLETED | OUTPATIENT
Start: 2018-01-01 | End: 2018-01-01

## 2018-01-01 RX ORDER — ACETAMINOPHEN 325 MG/1
650 TABLET ORAL
Status: DISCONTINUED | OUTPATIENT
Start: 2018-01-01 | End: 2018-01-01 | Stop reason: HOSPADM

## 2018-01-01 RX ORDER — ETOMIDATE 2 MG/ML
INJECTION INTRAVENOUS
Status: DISCONTINUED
Start: 2018-01-01 | End: 2018-01-01 | Stop reason: WASHOUT

## 2018-01-01 RX ORDER — POTASSIUM CHLORIDE 750 MG/1
60 CAPSULE, EXTENDED RELEASE ORAL ONCE
Status: DISCONTINUED | OUTPATIENT
Start: 2018-01-01 | End: 2018-01-01

## 2018-01-01 RX ORDER — ONDANSETRON 4 MG/1
4 TABLET, ORALLY DISINTEGRATING ORAL EVERY 8 HOURS PRN
Qty: 30 TABLET | Refills: 5 | Status: SHIPPED | OUTPATIENT
Start: 2018-01-01

## 2018-01-01 RX ORDER — LEVETIRACETAM 10 MG/ML
1000 INJECTION INTRAVASCULAR
Status: DISCONTINUED | OUTPATIENT
Start: 2018-01-01 | End: 2018-01-01

## 2018-01-01 RX ORDER — POTASSIUM CHLORIDE 20 MEQ/1
40 TABLET, EXTENDED RELEASE ORAL DAILY
Qty: 60 TABLET | Refills: 0 | Status: CANCELLED | OUTPATIENT
Start: 2018-01-01 | End: 2018-05-13

## 2018-01-01 RX ORDER — OXYMETAZOLINE HCL 0.05 %
2 SPRAY, NON-AEROSOL (ML) NASAL 2 TIMES DAILY PRN
Status: DISPENSED | OUTPATIENT
Start: 2018-01-01 | End: 2018-01-01

## 2018-01-01 RX ORDER — VANCOMYCIN/0.9 % SOD CHLORIDE 1 G/100 ML
1000 PLASTIC BAG, INJECTION (ML) INTRAVENOUS
Status: DISCONTINUED | OUTPATIENT
Start: 2018-01-01 | End: 2018-01-01

## 2018-01-01 RX ORDER — DIPHENHYDRAMINE HCL 25 MG
25 CAPSULE ORAL
Status: DISCONTINUED | OUTPATIENT
Start: 2018-01-01 | End: 2018-01-01

## 2018-01-01 RX ORDER — ACETAMINOPHEN 650 MG/20.3ML
650 LIQUID ORAL EVERY 6 HOURS PRN
Status: DISCONTINUED | OUTPATIENT
Start: 2018-01-01 | End: 2018-01-01 | Stop reason: HOSPADM

## 2018-01-01 RX ORDER — POTASSIUM CHLORIDE 20 MEQ/1
40 TABLET, EXTENDED RELEASE ORAL ONCE
Status: COMPLETED | OUTPATIENT
Start: 2018-01-01 | End: 2018-01-01

## 2018-01-01 RX ORDER — ACETAMINOPHEN 325 MG/1
650 TABLET ORAL EVERY 4 HOURS PRN
Status: DISCONTINUED | OUTPATIENT
Start: 2018-01-01 | End: 2018-01-01

## 2018-01-01 RX ORDER — DIPHENHYDRAMINE HCL 25 MG
25 CAPSULE ORAL ONCE
Status: COMPLETED | OUTPATIENT
Start: 2018-01-01 | End: 2018-01-01

## 2018-01-01 RX ORDER — PROPOFOL 10 MG/ML
20 INJECTION, EMULSION INTRAVENOUS
Status: DISCONTINUED | OUTPATIENT
Start: 2018-01-01 | End: 2018-01-01 | Stop reason: SDUPTHER

## 2018-01-01 RX ORDER — NITROGLYCERIN 20 MG/100ML
INJECTION INTRAVENOUS
Status: DISCONTINUED
Start: 2018-01-01 | End: 2018-01-01 | Stop reason: HOSPADM

## 2018-01-01 RX ORDER — ATROPINE SULFATE 10 MG/ML
2 SOLUTION/ DROPS OPHTHALMIC EVERY 4 HOURS PRN
Status: DISCONTINUED | OUTPATIENT
Start: 2018-01-01 | End: 2018-01-01 | Stop reason: HOSPADM

## 2018-01-01 RX ORDER — POTASSIUM CHLORIDE 750 MG/1
60 CAPSULE, EXTENDED RELEASE ORAL
Status: DISCONTINUED | OUTPATIENT
Start: 2018-01-01 | End: 2018-01-01

## 2018-01-01 RX ORDER — AMINOCAPROIC ACID 500 MG/1
2 TABLET ORAL EVERY 8 HOURS
Status: ON HOLD | COMMUNITY
Start: 2018-01-01 | End: 2018-01-01

## 2018-01-01 RX ORDER — MIDAZOLAM HYDROCHLORIDE 1 MG/ML
INJECTION INTRAMUSCULAR; INTRAVENOUS CODE/TRAUMA/SEDATION MEDICATION
Status: COMPLETED | OUTPATIENT
Start: 2018-01-01 | End: 2018-01-01

## 2018-01-01 RX ORDER — MIDAZOLAM HYDROCHLORIDE 1 MG/ML
1 INJECTION INTRAMUSCULAR; INTRAVENOUS
Status: DISCONTINUED | OUTPATIENT
Start: 2018-01-01 | End: 2018-01-01 | Stop reason: HOSPADM

## 2018-01-01 RX ORDER — ONDANSETRON 8 MG/1
8 TABLET, ORALLY DISINTEGRATING ORAL EVERY 8 HOURS PRN
Status: DISCONTINUED | OUTPATIENT
Start: 2018-01-01 | End: 2018-01-01 | Stop reason: HOSPADM

## 2018-01-01 RX ORDER — LIDOCAINE HYDROCHLORIDE AND EPINEPHRINE 10; 10 MG/ML; UG/ML
20 INJECTION, SOLUTION INFILTRATION; PERINEURAL ONCE
Status: DISCONTINUED | OUTPATIENT
Start: 2018-01-01 | End: 2018-01-01

## 2018-01-01 RX ORDER — DIPHENHYDRAMINE HCL 25 MG
25 CAPSULE ORAL EVERY 6 HOURS PRN
Status: DISCONTINUED | OUTPATIENT
Start: 2018-01-01 | End: 2018-01-01 | Stop reason: HOSPADM

## 2018-01-01 RX ORDER — POTASSIUM CHLORIDE 750 MG/1
60 CAPSULE, EXTENDED RELEASE ORAL ONCE
Status: COMPLETED | OUTPATIENT
Start: 2018-01-01 | End: 2018-01-01

## 2018-01-01 RX ORDER — SODIUM CHLORIDE 9 MG/ML
500 INJECTION, SOLUTION INTRAVENOUS ONCE
Status: DISCONTINUED | OUTPATIENT
Start: 2018-01-01 | End: 2018-01-01 | Stop reason: HOSPADM

## 2018-01-01 RX ORDER — SODIUM CHLORIDE 0.9 % (FLUSH) 0.9 %
3 SYRINGE (ML) INJECTION
Status: DISCONTINUED | OUTPATIENT
Start: 2018-01-01 | End: 2018-01-01 | Stop reason: HOSPADM

## 2018-01-01 RX ORDER — MORPHINE SULFATE 2 MG/ML
2 INJECTION, SOLUTION INTRAMUSCULAR; INTRAVENOUS EVERY 30 MIN PRN
Status: DISCONTINUED | OUTPATIENT
Start: 2018-01-01 | End: 2018-01-01 | Stop reason: HOSPADM

## 2018-01-01 RX ORDER — ACYCLOVIR 400 MG/1
400 TABLET ORAL 2 TIMES DAILY
Qty: 60 TABLET | Refills: 3 | Status: SHIPPED | OUTPATIENT
Start: 2018-01-01 | End: 2019-04-23

## 2018-01-01 RX ORDER — PROPOFOL 10 MG/ML
INJECTION, EMULSION INTRAVENOUS
Status: DISCONTINUED
Start: 2018-01-01 | End: 2018-01-01 | Stop reason: HOSPADM

## 2018-01-01 RX ORDER — NICOTINE 7MG/24HR
1 PATCH, TRANSDERMAL 24 HOURS TRANSDERMAL DAILY
Status: DISCONTINUED | OUTPATIENT
Start: 2018-01-01 | End: 2018-01-01 | Stop reason: HOSPADM

## 2018-01-01 RX ORDER — NOREPINEPHRINE BITARTRATE/D5W 4MG/250ML
0.05 PLASTIC BAG, INJECTION (ML) INTRAVENOUS CONTINUOUS
Status: DISCONTINUED | OUTPATIENT
Start: 2018-01-01 | End: 2018-01-01

## 2018-01-01 RX ORDER — HEPARIN SODIUM 200 [USP'U]/100ML
500 INJECTION, SOLUTION INTRAVENOUS CONTINUOUS
Status: DISCONTINUED | OUTPATIENT
Start: 2018-01-01 | End: 2018-01-01 | Stop reason: HOSPADM

## 2018-01-01 RX ORDER — AMINOCAPROIC ACID 500 MG/1
2 TABLET ORAL ONCE
Status: COMPLETED | OUTPATIENT
Start: 2018-01-01 | End: 2018-01-01

## 2018-01-01 RX ORDER — ROCURONIUM BROMIDE 10 MG/ML
INJECTION, SOLUTION INTRAVENOUS
Status: DISCONTINUED
Start: 2018-01-01 | End: 2018-01-01 | Stop reason: WASHOUT

## 2018-01-01 RX ORDER — NITROGLYCERIN 20 MG/100ML
5 INJECTION INTRAVENOUS CONTINUOUS
Status: DISCONTINUED | OUTPATIENT
Start: 2018-01-01 | End: 2018-01-01 | Stop reason: ALTCHOICE

## 2018-01-01 RX ORDER — CHLORHEXIDINE GLUCONATE ORAL RINSE 1.2 MG/ML
15 SOLUTION DENTAL 4 TIMES DAILY
Status: DISCONTINUED | OUTPATIENT
Start: 2018-01-01 | End: 2018-01-01

## 2018-01-01 RX ORDER — AMINOCAPROIC ACID 500 MG/1
2000 TABLET ORAL EVERY 8 HOURS
Status: DISCONTINUED | OUTPATIENT
Start: 2018-01-01 | End: 2018-01-01 | Stop reason: HOSPADM

## 2018-01-01 RX ORDER — CIPROFLOXACIN 500 MG/1
500 TABLET ORAL EVERY 12 HOURS
Qty: 60 TABLET | Refills: 3 | Status: SHIPPED | OUTPATIENT
Start: 2018-01-01

## 2018-01-01 RX ORDER — ALBUTEROL SULFATE 90 UG/1
2 AEROSOL, METERED RESPIRATORY (INHALATION) EVERY 6 HOURS PRN
Status: DISCONTINUED | OUTPATIENT
Start: 2018-01-01 | End: 2018-01-01 | Stop reason: HOSPADM

## 2018-01-01 RX ORDER — NICARDIPINE HYDROCHLORIDE 0.2 MG/ML
2.5 INJECTION INTRAVENOUS CONTINUOUS
Status: DISCONTINUED | OUTPATIENT
Start: 2018-01-01 | End: 2018-01-01

## 2018-01-01 RX ORDER — ACETAMINOPHEN 325 MG/1
325 TABLET ORAL 2 TIMES DAILY
COMMUNITY

## 2018-01-01 RX ORDER — FUROSEMIDE 10 MG/ML
20 INJECTION INTRAMUSCULAR; INTRAVENOUS ONCE
Status: COMPLETED | OUTPATIENT
Start: 2018-01-01 | End: 2018-01-01

## 2018-01-01 RX ORDER — ACETAMINOPHEN 325 MG/1
650 TABLET ORAL EVERY 4 HOURS PRN
Status: DISCONTINUED | OUTPATIENT
Start: 2018-01-01 | End: 2018-01-01 | Stop reason: HOSPADM

## 2018-01-01 RX ORDER — AZTREONAM 2 G/1
2000 INJECTION, POWDER, LYOPHILIZED, FOR SOLUTION INTRAMUSCULAR; INTRAVENOUS
Status: DISCONTINUED | OUTPATIENT
Start: 2018-01-01 | End: 2018-01-01

## 2018-01-01 RX ORDER — MANNITOL 250 MG/ML
75 INJECTION, SOLUTION INTRAVENOUS ONCE
Status: COMPLETED | OUTPATIENT
Start: 2018-01-01 | End: 2018-01-01

## 2018-01-01 RX ORDER — PROCHLORPERAZINE MALEATE 10 MG
10 TABLET ORAL EVERY 6 HOURS PRN
Qty: 30 TABLET | Refills: 6 | Status: CANCELLED | OUTPATIENT
Start: 2018-01-01 | End: 2018-05-13

## 2018-01-01 RX ORDER — NEBIVOLOL 5 MG/1
5 TABLET ORAL DAILY
Status: DISCONTINUED | OUTPATIENT
Start: 2018-01-01 | End: 2018-01-01 | Stop reason: HOSPADM

## 2018-01-01 RX ORDER — FENTANYL CITRATE 50 UG/ML
INJECTION, SOLUTION INTRAMUSCULAR; INTRAVENOUS CODE/TRAUMA/SEDATION MEDICATION
Status: COMPLETED | OUTPATIENT
Start: 2018-01-01 | End: 2018-01-01

## 2018-01-01 RX ORDER — ONDANSETRON 8 MG/1
8 TABLET, ORALLY DISINTEGRATING ORAL EVERY 8 HOURS PRN
Status: DISCONTINUED | OUTPATIENT
Start: 2018-01-01 | End: 2018-01-01

## 2018-01-01 RX ORDER — PROPOFOL 10 MG/ML
5 INJECTION, EMULSION INTRAVENOUS CONTINUOUS
Status: DISCONTINUED | OUTPATIENT
Start: 2018-01-01 | End: 2018-01-01

## 2018-01-01 RX ORDER — NOREPINEPHRINE BITARTRATE/D5W 4MG/250ML
PLASTIC BAG, INJECTION (ML) INTRAVENOUS
Status: COMPLETED
Start: 2018-01-01 | End: 2018-01-01

## 2018-01-01 RX ORDER — NEBIVOLOL 10 MG/1
10 TABLET ORAL DAILY
Status: DISCONTINUED | OUTPATIENT
Start: 2018-01-01 | End: 2018-01-01

## 2018-01-01 RX ORDER — IRBESARTAN 300 MG/1
300 TABLET ORAL DAILY
Status: ON HOLD | COMMUNITY
End: 2018-01-01

## 2018-01-01 RX ORDER — NITROGLYCERIN 20 MG/100ML
5 INJECTION INTRAVENOUS CONTINUOUS
Status: DISCONTINUED | OUTPATIENT
Start: 2018-01-01 | End: 2018-01-01 | Stop reason: SDUPTHER

## 2018-01-01 RX ORDER — FLUCONAZOLE 200 MG/1
400 TABLET ORAL DAILY
Qty: 60 TABLET | Refills: 3 | Status: SHIPPED | OUTPATIENT
Start: 2018-01-01 | End: 2018-05-23

## 2018-01-01 RX ORDER — SODIUM CHLORIDE 0.9 % (FLUSH) 0.9 %
3 SYRINGE (ML) INJECTION EVERY 8 HOURS
Status: DISCONTINUED | OUTPATIENT
Start: 2018-01-01 | End: 2018-01-01

## 2018-01-01 RX ORDER — HEPARIN 100 UNIT/ML
SYRINGE INTRAVENOUS CODE/TRAUMA/SEDATION MEDICATION
Status: COMPLETED | OUTPATIENT
Start: 2018-01-01 | End: 2018-01-01

## 2018-01-01 RX ORDER — LEVETIRACETAM 5 MG/ML
500 INJECTION INTRAVASCULAR EVERY 12 HOURS
Status: DISCONTINUED | OUTPATIENT
Start: 2018-01-01 | End: 2018-01-01

## 2018-01-01 RX ORDER — AMOXICILLIN 250 MG
1 CAPSULE ORAL 2 TIMES DAILY
Status: DISCONTINUED | OUTPATIENT
Start: 2018-01-01 | End: 2018-01-01 | Stop reason: HOSPADM

## 2018-01-01 RX ORDER — HYDROCODONE BITARTRATE AND ACETAMINOPHEN 500; 5 MG/1; MG/1
TABLET ORAL
Status: DISCONTINUED | OUTPATIENT
Start: 2018-01-01 | End: 2018-01-01 | Stop reason: SDUPTHER

## 2018-01-01 RX ORDER — OXYCODONE HYDROCHLORIDE 5 MG/1
5 TABLET ORAL ONCE
Status: COMPLETED | OUTPATIENT
Start: 2018-01-01 | End: 2018-01-01

## 2018-01-01 RX ORDER — LIDOCAINE HYDROCHLORIDE 10 MG/ML
10 INJECTION INFILTRATION; PERINEURAL
Status: DISCONTINUED | OUTPATIENT
Start: 2018-01-01 | End: 2018-01-01 | Stop reason: HOSPADM

## 2018-01-01 RX ORDER — BUPROPION HYDROCHLORIDE 100 MG/1
100 TABLET ORAL 2 TIMES DAILY
Qty: 60 TABLET | Refills: 11 | Status: SHIPPED | OUTPATIENT
Start: 2018-01-01 | End: 2019-04-09

## 2018-01-01 RX ORDER — LABETALOL HYDROCHLORIDE 5 MG/ML
10 INJECTION, SOLUTION INTRAVENOUS EVERY 4 HOURS PRN
Status: DISCONTINUED | OUTPATIENT
Start: 2018-01-01 | End: 2018-01-01

## 2018-01-01 RX ORDER — DEXAMETHASONE SODIUM PHOSPHATE 100 MG/10ML
40 INJECTION INTRAMUSCULAR; INTRAVENOUS EVERY 24 HOURS
Status: DISCONTINUED | OUTPATIENT
Start: 2018-01-01 | End: 2018-01-01 | Stop reason: HOSPADM

## 2018-01-01 RX ORDER — HYDROCODONE BITARTRATE AND ACETAMINOPHEN 500; 5 MG/1; MG/1
TABLET ORAL ONCE
Status: DISCONTINUED | OUTPATIENT
Start: 2018-01-01 | End: 2018-07-20 | Stop reason: HOSPADM

## 2018-01-01 RX ORDER — OXYCODONE HYDROCHLORIDE 5 MG/1
5 TABLET ORAL EVERY 6 HOURS PRN
Status: DISCONTINUED | OUTPATIENT
Start: 2018-01-01 | End: 2018-01-01 | Stop reason: HOSPADM

## 2018-01-01 RX ADMIN — Medication 3 ML: at 10:04

## 2018-01-01 RX ADMIN — SODIUM CHLORIDE: 9 INJECTION, SOLUTION INTRAVENOUS at 11:04

## 2018-01-01 RX ADMIN — SUCCINYLCHOLINE CHLORIDE 100 MG: 20 INJECTION, SOLUTION INTRAMUSCULAR; INTRAVENOUS at 08:04

## 2018-01-01 RX ADMIN — AZTREONAM 2000 MG: 2 INJECTION, POWDER, LYOPHILIZED, FOR SOLUTION INTRAMUSCULAR; INTRAVENOUS at 06:04

## 2018-01-01 RX ADMIN — ATROPINE SULFATE 2 DROP: 10 SOLUTION/ DROPS OPHTHALMIC at 04:04

## 2018-01-01 RX ADMIN — SUCCINYLCHOLINE CHLORIDE 100 MG: 20 INJECTION INTRAMUSCULAR; INTRAVENOUS at 08:04

## 2018-01-01 RX ADMIN — ACETAMINOPHEN 650 MG: 325 TABLET ORAL at 05:04

## 2018-01-01 RX ADMIN — TRANEXAMIC ACID 500 MG: 100 INJECTION, SOLUTION INTRAVENOUS at 10:04

## 2018-01-01 RX ADMIN — ACETAMINOPHEN 650 MG: 325 TABLET, FILM COATED ORAL at 11:04

## 2018-01-01 RX ADMIN — ACETAMINOPHEN 650 MG: 325 TABLET, FILM COATED ORAL at 01:04

## 2018-01-01 RX ADMIN — AZACITIDINE 145 MG: 100 INJECTION, POWDER, LYOPHILIZED, FOR SOLUTION INTRAVENOUS; SUBCUTANEOUS at 12:04

## 2018-01-01 RX ADMIN — SODIUM CHLORIDE: 9 INJECTION, SOLUTION INTRAVENOUS at 10:04

## 2018-01-01 RX ADMIN — Medication 1250 MG: at 08:04

## 2018-01-01 RX ADMIN — DIPHENHYDRAMINE HYDROCHLORIDE 25 MG: 25 CAPSULE ORAL at 10:04

## 2018-01-01 RX ADMIN — MANNITOL 75 G: 250 INJECTION, SOLUTION INTRAVENOUS at 09:04

## 2018-01-01 RX ADMIN — DIPHENHYDRAMINE HYDROCHLORIDE 25 MG: 25 CAPSULE ORAL at 11:04

## 2018-01-01 RX ADMIN — DIPHENHYDRAMINE HYDROCHLORIDE 50 MG: 50 INJECTION, SOLUTION INTRAMUSCULAR; INTRAVENOUS at 08:04

## 2018-01-01 RX ADMIN — TRANEXAMIC ACID 1000 MG: 100 INJECTION, SOLUTION INTRAVENOUS at 06:04

## 2018-01-01 RX ADMIN — AZACITIDINE 145 MG: 100 INJECTION, POWDER, LYOPHILIZED, FOR SOLUTION INTRAVENOUS; SUBCUTANEOUS at 11:04

## 2018-01-01 RX ADMIN — NEBIVOLOL HYDROCHLORIDE 5 MG: 5 TABLET ORAL at 09:03

## 2018-01-01 RX ADMIN — AMINOCAPROIC ACID 2 G: 500 TABLET ORAL at 06:03

## 2018-01-01 RX ADMIN — FUROSEMIDE 20 MG: 10 INJECTION, SOLUTION INTRAMUSCULAR; INTRAVENOUS at 10:03

## 2018-01-01 RX ADMIN — ACETAMINOPHEN 650 MG: 325 TABLET, FILM COATED ORAL at 12:04

## 2018-01-01 RX ADMIN — POTASSIUM CHLORIDE 40 MEQ: 1500 TABLET, EXTENDED RELEASE ORAL at 03:04

## 2018-01-01 RX ADMIN — Medication 0.02 MCG/KG/MIN: at 02:04

## 2018-01-01 RX ADMIN — Medication 1250 MG: at 10:04

## 2018-01-01 RX ADMIN — AMINOCAPROIC ACID 2000 MG: 500 TABLET ORAL at 06:04

## 2018-01-01 RX ADMIN — ACETAMINOPHEN 325 MG: 325 TABLET ORAL at 02:04

## 2018-01-01 RX ADMIN — LIDOCAINE HYDROCHLORIDE 10 ML: 20 INJECTION, SOLUTION INFILTRATION; PERINEURAL at 09:03

## 2018-01-01 RX ADMIN — FENTANYL CITRATE 50 MCG: 50 INJECTION, SOLUTION INTRAMUSCULAR; INTRAVENOUS at 10:04

## 2018-01-01 RX ADMIN — DEXAMETHASONE SODIUM PHOSPHATE 40 MG: 10 INJECTION, SOLUTION INTRAMUSCULAR; INTRAVENOUS at 07:03

## 2018-01-01 RX ADMIN — DIPHENHYDRAMINE HYDROCHLORIDE 25 MG: 25 CAPSULE ORAL at 11:03

## 2018-01-01 RX ADMIN — OXYCODONE HYDROCHLORIDE 5 MG: 5 TABLET ORAL at 05:03

## 2018-01-01 RX ADMIN — SODIUM CHLORIDE 8 MG: 9 INJECTION, SOLUTION INTRAVENOUS at 11:04

## 2018-01-01 RX ADMIN — POTASSIUM CHLORIDE 40 MEQ: 1500 TABLET, EXTENDED RELEASE ORAL at 09:03

## 2018-01-01 RX ADMIN — PROPOFOL 10 MCG/KG/MIN: 10 INJECTION, EMULSION INTRAVENOUS at 08:04

## 2018-01-01 RX ADMIN — OXYCODONE HYDROCHLORIDE 5 MG: 5 TABLET ORAL at 03:03

## 2018-01-01 RX ADMIN — DEXAMETHASONE SODIUM PHOSPHATE 40 MG: 10 INJECTION, SOLUTION INTRAMUSCULAR; INTRAVENOUS at 09:03

## 2018-01-01 RX ADMIN — AZACITIDINE 145 MG: 100 INJECTION, POWDER, LYOPHILIZED, FOR SOLUTION SUBCUTANEOUS at 11:04

## 2018-01-01 RX ADMIN — AMINOCAPROIC ACID 2000 MG: 500 TABLET ORAL at 05:04

## 2018-01-01 RX ADMIN — MIDAZOLAM HYDROCHLORIDE 1 MG: 1 INJECTION, SOLUTION INTRAMUSCULAR; INTRAVENOUS at 10:04

## 2018-01-01 RX ADMIN — FENTANYL CITRATE 25 MCG: 50 INJECTION, SOLUTION INTRAMUSCULAR; INTRAVENOUS at 10:04

## 2018-01-01 RX ADMIN — POTASSIUM CHLORIDE 40 MEQ: 1500 TABLET, EXTENDED RELEASE ORAL at 05:03

## 2018-01-01 RX ADMIN — FENTANYL CITRATE 50 MCG: 50 INJECTION, SOLUTION INTRAMUSCULAR; INTRAVENOUS at 11:04

## 2018-01-01 RX ADMIN — SODIUM CHLORIDE 8 MG: 9 INJECTION, SOLUTION INTRAVENOUS at 10:04

## 2018-01-01 RX ADMIN — SODIUM CHLORIDE: 9 INJECTION, SOLUTION INTRAVENOUS at 02:04

## 2018-01-01 RX ADMIN — ACETAMINOPHEN 650 MG: 325 TABLET ORAL at 10:04

## 2018-01-01 RX ADMIN — DIPHENHYDRAMINE HYDROCHLORIDE 25 MG: 25 CAPSULE ORAL at 07:04

## 2018-01-01 RX ADMIN — POTASSIUM CHLORIDE 40 MEQ: 1500 TABLET, EXTENDED RELEASE ORAL at 03:03

## 2018-01-01 RX ADMIN — TRAMADOL HYDROCHLORIDE 50 MG: 50 TABLET, FILM COATED ORAL at 05:04

## 2018-01-01 RX ADMIN — AZTREONAM 2000 MG: 2 INJECTION, POWDER, LYOPHILIZED, FOR SOLUTION INTRAMUSCULAR; INTRAVENOUS at 05:04

## 2018-01-01 RX ADMIN — OXYCODONE HYDROCHLORIDE 5 MG: 5 TABLET ORAL at 11:03

## 2018-01-01 RX ADMIN — SODIUM CHLORIDE 1000 ML: 0.9 INJECTION, SOLUTION INTRAVENOUS at 02:04

## 2018-01-01 RX ADMIN — ONDANSETRON 8 MG: 2 INJECTION, SOLUTION INTRAMUSCULAR; INTRAVENOUS at 11:04

## 2018-01-01 RX ADMIN — SODIUM CHLORIDE: 9 INJECTION, SOLUTION INTRAVENOUS at 01:04

## 2018-01-01 RX ADMIN — VANCOMYCIN HYDROCHLORIDE 1 G: 10 INJECTION, POWDER, LYOPHILIZED, FOR SOLUTION INTRAVENOUS at 09:04

## 2018-01-01 RX ADMIN — NIMODIPINE 60 MG: 60 SOLUTION ORAL at 10:04

## 2018-01-01 RX ADMIN — FUROSEMIDE 20 MG: 10 INJECTION, SOLUTION INTRAMUSCULAR; INTRAVENOUS at 06:03

## 2018-01-01 RX ADMIN — MORPHINE SULFATE 2 MG: 2 INJECTION, SOLUTION INTRAMUSCULAR; INTRAVENOUS at 04:04

## 2018-01-01 RX ADMIN — MIDAZOLAM HYDROCHLORIDE 2 MG: 1 INJECTION, SOLUTION INTRAMUSCULAR; INTRAVENOUS at 10:04

## 2018-01-01 RX ADMIN — CHLORHEXIDINE GLUCONATE 15 ML: 1.2 RINSE ORAL at 12:04

## 2018-01-01 RX ADMIN — DIPHENHYDRAMINE HYDROCHLORIDE 25 MG: 25 CAPSULE ORAL at 05:04

## 2018-01-01 RX ADMIN — DIPHENHYDRAMINE HYDROCHLORIDE 25 MG: 25 CAPSULE ORAL at 01:04

## 2018-01-01 RX ADMIN — LEVETIRACETAM 1000 MG: 5 INJECTION INTRAVENOUS at 10:04

## 2018-01-01 RX ADMIN — NIMODIPINE 60 MG: 60 SOLUTION ORAL at 02:04

## 2018-01-01 RX ADMIN — IOHEXOL 75 ML: 350 INJECTION, SOLUTION INTRAVENOUS at 08:04

## 2018-01-01 RX ADMIN — OXYCODONE HYDROCHLORIDE 5 MG: 5 TABLET ORAL at 09:03

## 2018-01-01 RX ADMIN — AMINOCAPROIC ACID 2 G: 500 TABLET ORAL at 10:04

## 2018-01-01 RX ADMIN — SODIUM CHLORIDE: 9 INJECTION, SOLUTION INTRAVENOUS at 12:04

## 2018-01-01 RX ADMIN — NEBIVOLOL HYDROCHLORIDE 5 MG: 5 TABLET ORAL at 10:03

## 2018-01-01 RX ADMIN — AZACITIDINE 145 MG: 100 INJECTION, POWDER, LYOPHILIZED, FOR SOLUTION SUBCUTANEOUS at 12:04

## 2018-01-01 RX ADMIN — STANDARDIZED SENNA CONCENTRATE AND DOCUSATE SODIUM 1 TABLET: 8.6; 5 TABLET, FILM COATED ORAL at 03:03

## 2018-01-01 RX ADMIN — POTASSIUM CHLORIDE 60 MEQ: 750 CAPSULE, EXTENDED RELEASE ORAL at 11:04

## 2018-01-01 RX ADMIN — POTASSIUM CHLORIDE 60 MEQ: 750 CAPSULE, EXTENDED RELEASE ORAL at 06:04

## 2018-01-01 RX ADMIN — SODIUM CHLORIDE 1000 ML: 0.9 INJECTION, SOLUTION INTRAVENOUS at 11:03

## 2018-01-01 RX ADMIN — SODIUM CHLORIDE: 0.9 INJECTION, SOLUTION INTRAVENOUS at 11:04

## 2018-01-01 RX ADMIN — ACETAMINOPHEN 650 MG: 325 TABLET ORAL at 07:04

## 2018-01-01 RX ADMIN — ACETAMINOPHEN 650 MG: 325 TABLET ORAL at 02:04

## 2018-01-01 RX ADMIN — MORPHINE SULFATE 2 MG/HR: 10 INJECTION INTRAVENOUS at 04:04

## 2018-01-01 RX ADMIN — HEPARIN SODIUM (PORCINE) LOCK FLUSH IV SOLN 100 UNIT/ML 5 ML: 100 SOLUTION at 10:04

## 2018-03-17 PROBLEM — Z72.0 TOBACCO ABUSE: Status: ACTIVE | Noted: 2018-01-01

## 2018-03-17 PROBLEM — D69.6 THROMBOCYTOPENIA, UNSPECIFIED: Status: ACTIVE | Noted: 2018-01-01

## 2018-03-17 PROBLEM — D59.10 AUTOIMMUNE HEMOLYTIC ANEMIA: Status: ACTIVE | Noted: 2018-01-01

## 2018-03-17 PROBLEM — J45.909 MILD ASTHMA: Status: ACTIVE | Noted: 2018-01-01

## 2018-03-17 PROBLEM — D46.9 MDS (MYELODYSPLASTIC SYNDROME): Status: ACTIVE | Noted: 2018-01-01

## 2018-03-17 PROBLEM — D69.6 THROMBOCYTOPENIA: Status: ACTIVE | Noted: 2018-01-01

## 2018-03-17 PROBLEM — I10 ESSENTIAL HYPERTENSION: Status: ACTIVE | Noted: 2018-01-01

## 2018-03-17 NOTE — SUBJECTIVE & OBJECTIVE
No past medical history on file.    No past surgical history on file.    Review of patient's allergies indicates:  No Known Allergies    No current facility-administered medications on file prior to encounter.      No current outpatient prescriptions on file prior to encounter.     Family History     None        Social History Main Topics    Smoking status: Not on file    Smokeless tobacco: Not on file    Alcohol use Not on file    Drug use: Unknown    Sexual activity: Not on file     Review of Systems   Constitutional: Positive for fatigue. Negative for chills, diaphoresis and fever.   HENT: Negative for trouble swallowing.    Eyes: Negative for visual disturbance.   Respiratory: Positive for cough and shortness of breath.    Cardiovascular: Negative for chest pain, palpitations and leg swelling.   Gastrointestinal: Negative for abdominal distention, abdominal pain, constipation, diarrhea, nausea and vomiting.   Genitourinary: Negative for dysuria and hematuria.   Musculoskeletal: Negative for arthralgias and back pain.   Skin: Positive for color change.   Neurological: Positive for weakness. Negative for dizziness, light-headedness and headaches.   Hematological: Bruises/bleeds easily.     Objective:     Vital Signs (Most Recent):  Temp: 98.8 °F (37.1 °C) (03/17/18 0320)  Pulse: 89 (03/17/18 0435)  Resp: (!) 22 (03/17/18 0435)  BP: (!) 144/63 (03/17/18 0320)  SpO2: 99 % (03/17/18 0435) Vital Signs (24h Range):  Temp:  [98.8 °F (37.1 °C)] 98.8 °F (37.1 °C)  Pulse:  [] 89  Resp:  [16-23] 22  SpO2:  [95 %-100 %] 99 %  BP: (111-222)/(63-88) 144/63     Weight: 77.1 kg (170 lb)  Body mass index is 29.18 kg/m².    Physical Exam   Constitutional: She is oriented to person, place, and time. She appears well-developed and well-nourished.   HENT:   Head: Normocephalic and atraumatic.   Right Ear: External ear normal.   Left Ear: External ear normal.   Eyes: EOM are normal.   Neck: Neck supple. No JVD present.    Cardiovascular: Normal rate, regular rhythm, normal heart sounds and intact distal pulses.    Pulmonary/Chest: Effort normal and breath sounds normal.   Abdominal: Soft. Bowel sounds are normal. There is no tenderness.   Musculoskeletal: She exhibits no edema.   Neurological: She is alert and oriented to person, place, and time.   Skin: Skin is warm and dry. Capillary refill takes less than 2 seconds. There is pallor.   Large bruises noted on arms    Psychiatric: She has a normal mood and affect. Her behavior is normal.         CRANIAL NERVES     CN III, IV, VI   Extraocular motions are normal.        Significant Labs: All pertinent labs within the past 24 hours have been reviewed.    Significant Imaging: I have reviewed all pertinent imaging results/findings within the past 24 hours.

## 2018-03-17 NOTE — ED PROVIDER NOTES
Encounter Date: 3/16/2018    SCRIBE #1 NOTE: I, Benton Eastman, am scribing for, and in the presence of,  Dr. La. I have scribed the following portions of the note - Other sections scribed: HPI, ROS, PE.     I, Dr. Warren La, personally performed the services described in this documentation. All medical record entries made by the scribe were at my direction and in my presence.  I have reviewed the chart and agree that the record reflects my personal performance and is accurate and complete.  Warren La MD.  12:52 AM 03/17/2018      History     Chief Complaint   Patient presents with    Abnormal Lab     Pt has critical platelet level of 7 and Hct of 17, just started receiving care through [facility] yesterday for mylodysplastic syndrome, pt reports weakness, fatigue and SOB. Hospitalized and transfused last week at different hospital for same thing.      Time seen by provider: 11:20 PM    This is a 62 y.o. female recently diagnosed with myelodysplastic syndrome, sent by Hematology/Oncology to the Emergency Department for evaluation of decreased platelets of 7 and hematocrit of 17 on outpatient labs obtained this morning. Patient complains of generalized weakness, shortness of breath when walking (although she reports still being able to get up and walk), fatigue. She also reports nightly gum bleeding. Patient apparently was admitted at an outside hospital last week for similar.  She denies any previous history of bleeding disorder.       The history is provided by the patient.     Review of patient's allergies indicates:  No Known Allergies  No past medical history on file.  No past surgical history on file.  No family history on file.  Social History   Substance Use Topics    Smoking status: Not on file    Smokeless tobacco: Not on file    Alcohol use Not on file     Review of Systems   Constitutional: Positive for fatigue. Negative for fever.   HENT: Negative for sore throat.         Positive for gum bleeding    Respiratory: Positive for shortness of breath.    Cardiovascular: Negative for chest pain.   Gastrointestinal: Negative for nausea.   Genitourinary: Negative for dysuria.   Musculoskeletal: Negative for back pain.   Skin: Negative for rash.   Neurological: Positive for weakness (generalized).   Hematological: Does not bruise/bleed easily.       Physical Exam     Initial Vitals [03/16/18 2301]   BP Pulse Resp Temp SpO2   111/72 108 20 98.8 °F (37.1 °C) 97 %      MAP       85         Physical Exam    Nursing note and vitals reviewed.  Constitutional: She appears well-developed and well-nourished.   HENT:   Head: Normocephalic and atraumatic.   Eyes: EOM are normal. Pupils are equal, round, and reactive to light.   Pale conjunctivae.    Neck: Normal range of motion. Neck supple. No tracheal deviation present.   Cardiovascular: Normal rate, regular rhythm, normal heart sounds and intact distal pulses.   Pulmonary/Chest: Breath sounds normal. No respiratory distress. She has no wheezes. She has no rhonchi. She has no rales. She exhibits no tenderness.   Abdominal: Soft. Bowel sounds are normal. She exhibits no distension and no mass. There is no tenderness. There is no rebound and no guarding.   Musculoskeletal: Normal range of motion. She exhibits no edema or tenderness.   Neurological: She is alert and oriented to person, place, and time. She has normal strength and normal reflexes. She displays normal reflexes. No cranial nerve deficit or sensory deficit.   Skin: Skin is warm and dry. Capillary refill takes less than 2 seconds. There is pallor (generalized).   Psychiatric: She has a normal mood and affect. Her behavior is normal. Judgment and thought content normal.         ED Course   Procedures  Labs Reviewed   CBC W/ AUTO DIFFERENTIAL - Abnormal; Notable for the following:        Result Value    WBC 3.81 (*)     RBC 1.52 (*)     Hemoglobin 5.5 (*)     Hematocrit 15.4 (*)      (*)     MCH 36.2 (*)     RDW  17.6 (*)     Platelets 7 (*)     All other components within normal limits    Narrative:       Plt critical result(s) called and verbal readback obtained from   Guera Esteves RN., 03/17/2018 00:28  HGB HCT critical result(s) called and verbal readback obtained from   Guera Esteves RN , 03/17/2018 00:05   COMPREHENSIVE METABOLIC PANEL - Abnormal; Notable for the following:     Sodium 135 (*)     Potassium 3.1 (*)     Glucose 137 (*)     Total Bilirubin 1.2 (*)     All other components within normal limits   C-REACTIVE PROTEIN - Abnormal; Notable for the following:     .5 (*)     All other components within normal limits   SEDIMENTATION RATE, MANUAL   URINALYSIS   TYPE & SCREEN   PREPARE RBC SOFT   PREPARE PLATELETS (DOSE) SOFT             Medical Decision Making:   ED Management:  Patient's CBC revealed significant abnormalities and her H&H, as well as severe thrombocytopenia.  I was able to speak with the hematology oncology physician on-call, regarding the patient, her similar levels last week, and her ability to appropriately tolerate PRBC and platelet transfusion.  He recommended at this point that the patient be transfused gently overnight, they stated that she would likely be older follow-up in the hematology clinic, of course pending her previous bone marrow biopsy results.  The patient has remained asymptomatic understanding at this point is being admitted to the hospitalist for further treatment, assessment, and evaluation.  There is a possibility that the patient's thrombocytopenia is from a consumptive process, but because of these levels there is a significant risk for a spontaneous intracranial bleed, making a gentle overnight transfusion of platelets warranted.            Scribe Attestation:   Scribe #1: I performed the above scribed service and the documentation accurately describes the services I performed. I attest to the accuracy of the note.               Clinical Impression:   The primary  encounter diagnosis was Thrombocytopenia. Diagnoses of SOB (shortness of breath) and Anemia, unspecified type were also pertinent to this visit.                           Sherwin La MD  03/17/18 0055       Sherwin La MD  03/17/18 0243

## 2018-03-17 NOTE — PLAN OF CARE
VSS and afebrile. Bed in lower and locked position, call light within reach and non-skid socks on when ambulating. Pt received 2U RBC thus far today. Plan for d/c if with recheck Hgb >7 and   Plt >10. 93-97% on RA. Care on going. Will continue to monitor.

## 2018-03-17 NOTE — ASSESSMENT & PLAN NOTE
- Diagnosed recently at OSH via bone marrow biopsy. Report found in chart. 7% myeloblasts noted  - [place] [facility] [place]   - Hem-Onc following, recommending supportive transfusions for hemoglobin goal >7g/dL and platelet goal >10k  -s/p 2u PRBCs and 2u platelets with appropriate response to hgb though platelet count failing to respond as such will remain hospitalized for HLA matched platelet transfusion

## 2018-03-17 NOTE — DISCHARGE SUMMARY
[facility] Hospital Medicine  Discharge Summary      Patient Name: Brooke Olivas  MRN: [id number]  Admission Date: 3/16/2018  Hospital Length of Stay: 5 days  Discharge Date and Time:  03/22/2018 11:23 AM  Attending Physician: Joey Arguello MD   Discharging Provider: Darrel Richmond MD  Primary Care Provider: Primary Doctor [facility] Medicine Team: [facility] HOSP MED 3 Darrel Richmond MD    HPI:   63 yo female with pmh of MDS (recently diagnosed), thrombocytopenia, anemia, HTN, asthma, and tobacco who presented to ED after outpatient labs indicated severe anemia and thrombocytopenia. Patient states that she was admitted to hospital in [place] last week after outpatient lab-work done by hematologist showed Hgb of 6 and platelets of 8. She received 2 units of platelets and 2 packets of platelets and discharged after 2 days. Bone marrow biopsy was also performed which showing 7% myeloblasts suggestive of myelodysplastic syndrome (report is found in patient's chart). She was referred to [facility] for further work-up and has been in [place] since Sunday living with her grand-daughter Labs were ordered by Dr Hopkins today and showed Hgb 6.1 and Plt of 7, prompting patient and family to report to ED. Direct ariana test was positive on o/p labs as well. Patient states that she is fatigued. She endorses easy bruising and has extensive bruising on her arms. She denies CP but endorses dyspnea on exertion. Denies fever/chills, HA, abdominal pain, nausea, vomiting. Patient's mother had colon cancer but she denies any other family history of malignancy or autoimmune disorders. Patient is a smoker with 40 pack year history.    * No surgery found *      Hospital Course:   Brooke Olivas was admitted to [facility] from OSH after lab work was concerning for MDS; hgb 5.0 g/dL on admission platelets 7k. Received 4 unit of platelets and 5 units PRBCs, hgb responded as expected; still thrombocytopenic however.  Patient underwent bone marrow biopsy. Heme/Onc followed patient over the course of her hospitalization and after inpatient workup recommended DC to home with close follow-up with Dr. Stephanie Hopkins after discharge. Patient is medically stable and appropriate for DC to home with close heme f/u. She will be started on Aminocaproic acid 2000mg q8h to be taken until f/u to be reevaluated at that time.     Review of Systems   Constitutional: Positive for fatigue. Negative for chills, diaphoresis and fever.   HENT: Negative for trouble swallowing.    Eyes: Negative for visual disturbance.   Respiratory: Positive for cough   Cardiovascular: Negative for chest pain, palpitations and leg swelling.   Gastrointestinal: Negative for abdominal distention, abdominal pain, constipation, diarrhea, nausea and vomiting.   Genitourinary: Negative for dysuria and hematuria.   Musculoskeletal: Negative for arthralgias and back pain.   Skin: Positive for color change.   Neurological: Positive for weakness. Negative for dizziness, light-headedness and headaches.   Hematological: Bruises/bleeds easily.     Patient was seen and examined on day of discharge. Vital signs reviewed and exam as follows:    Physical Exam   Constitutional: She is oriented to person, place, and time. She appears well-developed and well-nourished.   HENT:   Head: Normocephalic and atraumatic.   Right Ear: External ear normal.   Left Ear: External ear normal.   Eyes: EOM are normal.   Neck: Neck supple. No JVD present.   Cardiovascular: Normal rate, regular rhythm, normal heart sounds and intact distal pulses.    Pulmonary/Chest: Effort normal and breath sounds normal.   Abdominal: Soft. Bowel sounds are normal. There is no tenderness.   Musculoskeletal: She exhibits no edema.   Neurological: She is alert and oriented to person, place, and time.   Skin: Skin is warm and dry. Capillary refill takes less than 2 seconds. There is pallor.   Large bruises noted on arms     Psychiatric: She has a normal mood and affect. Her behavior is normal.     Consults:   Consults         Status Ordering Provider     Inpatient consult to Hematology/Oncology  Once     Provider:  (Not yet assigned)    Completed CALIN MULLINS     Inpatient consult to Pathology  Once     Provider:  (Not yet assigned)    Acknowledged PRATER, BEHCESAR          Autoimmune hemolytic anemia    Ruled-out   - Direct ariana test positive on outpatient labs      Thrombocytopenia, unspecified    Likely 2/2/ MDS, transfused 1u platelets to a goal of > 10k      Mild intermittent asthma without complication       - well controlled   -Albuterol PRN          Tobacco abuse    - nicotine patch       Essential hypertension    - patient reports low blood pressure on current home regiment of nebivolol 5, irbesartan 300, and HCTZ 12.5. She sometimes holds irbesartan on her own   - resume nebivolol  - Hold HCTZ due to hypokalemia   - Can re-start irbesartan if BP not well controlled      * MDS (myelodysplastic syndrome)    - HgB dropped again, concern for AML, s/p Bone marrow 3/21, Will transfuse today s/p 2 units of blood and 2 units of platelets.   - Diagnosed recently at OSH via bone marrow biopsy. Report found in chart. 7% myeloblasts noted  - Has not seen [place] [facility] [place]   - Hem-Onc following, recommending supportive transfusions for hemoglobin goal >7g/dL and platelet goal >10k  - Continue dexamethasone 40mg daily for 4 days to treat for potential ITP in the setting of MDS.  BM biopsy done of lef posterior superior iliac crest, path results returned, will f/u with Heme/onc recs today       Symptomatic anemia   Continues to require PRBC transfusions        Final Active Diagnoses:    Diagnosis Date Noted POA    PRINCIPAL PROBLEM:  MDS (myelodysplastic syndrome) with excess blasts [D46.Z] 03/17/2018 Yes    Symptomatic anemia [D64.9] 03/19/2018 Yes    Essential hypertension [I10] 03/17/2018 Yes    Tobacco abuse  [Z72.0] 03/17/2018 Yes    Mild intermittent asthma without complication [J45.20] 03/17/2018 Yes    Thrombocytopenia, unspecified [D69.6] 03/17/2018 Yes      Problems Resolved During this Admission:    Diagnosis Date Noted Date Resolved POA       Discharged Condition: stable    Disposition:     Follow Up:  Follow-up Information     Primary Doctor NoJerry Montelongo MD In 2 days.    Specialties:  Hematology and Oncology, Hematology, Oncology  Contact information:  [facility]  [facility] [id number] [phone]                 Patient Instructions:     Ambulatory Referral to Hematology / Oncology   Referral Priority: Routine Referral Type: Consultation   Referral Reason: Specialty Services Required    Referred to Provider: MELODY MONTELONGO Requested Specialty: Hematology and Oncology   Number of Visits Requested: 1      Diet Adult Regular     Activity as tolerated     Notify your health care provider if you experience any of the following:  temperature >100.4     Notify your health care provider if you experience any of the following:  increased confusion or weakness     Notify your health care provider if you experience any of the following:  persistent dizziness, light-headedness, or visual disturbances         Significant Diagnostic Studies: Labs:   CMP     Recent Labs  Lab 03/21/18  0443 03/22/18  0722    137   K 4.3 4.2    105   CO2 21* 22*   * 115*   BUN 25* 29*   CREATININE 0.8 0.8   CALCIUM 8.8 9.3   ANIONGAP 11 10   ESTGFRAFRICA >60.0 >60.0   EGFRNONAA >60.0 >60.0    and CBC     Recent Labs  Lab 03/21/18  0714 03/21/18  1942 03/22/18  0722   WBC 3.89* 6.28 8.54   HGB 5.5* 6.1* 8.1*   HCT 16.0* 17.8* 23.6*   PLT 6* 7* 8*       Pending Diagnostic Studies:     None         Medications:  Reconciled Home Medications:   Current Discharge Medication List      START taking these medications    Details   aminocaproic acid 1,000 mg Tab Take 2,000 mg by mouth every 8 (eight)  hours.  Qty: 180 each, Refills: 1         CONTINUE these medications which have NOT CHANGED    Details   albuterol (VENTOLIN HFA) 90 mcg/actuation inhaler Inhale 1 puff into the lungs every 6 (six) hours as needed for Wheezing or Shortness of Breath. Rescue      nebivolol (BYSTOLIC) 5 MG Tab Take 5 mg by mouth once daily.       hydroCHLOROthiazide (MICROZIDE) 12.5 mg capsule Take 12.5 mg by mouth once daily.      irbesartan (AVAPRO) 300 MG tablet Take 300 mg by mouth once daily.           Indwelling Lines/Drains at time of discharge:   Lines/Drains/Airways          No matching active lines, drains, or airways        Darrel Richmond MD  Department of Hospital Medicine  [facility]

## 2018-03-17 NOTE — ED NOTES
Pt. Up to restroom and back to bed safely. NAD noted. Denies any current needs. Call light within reach. Pt. Remains safe on unit with family at bedside.

## 2018-03-17 NOTE — ASSESSMENT & PLAN NOTE
S/p 2u platelet transfusion without appropriate response in platelet count  -Heme/onc following, recommending HLA matched platelets  -Transfuse to a goal of greater than 10k

## 2018-03-17 NOTE — H&P
[facility] Hospital Medicine  History & Physical    Patient Name: Brooke Olivas  MRN: [id number]  Admission Date: 3/16/2018  Attending Physician: Sherwin La MD   Primary Care Provider: Primary Doctor [facility] Medicine Team: [facility]OSP MED 3 Alexander Perry MD     Patient information was obtained from patient, relative(s), past medical records and ER records.     Subjective:     Principal Problem:Autoimmune hemolytic anemia    Chief Complaint:   Chief Complaint   Patient presents with    Abnormal Lab     Pt has critical platelet level of 7 and Hct of 17, just started receiving care through [facility] yesterday for mylodysplastic syndrome, pt reports weakness, fatigue and SOB. Hospitalized and transfused last week at different hospital for same thing.         HPI: 63 yo female with pmh of MDS (recently diagnosed), thrombocytopenia, anemia, HTN, asthma, and tobacco who presented to ED after outpatient labs indicated severe anemia and thrombocytopenia. Patient states that she was admitted to hospital in [place] last week after outpatient lab-work done by hematologist showed Hgb of 6 and platelets of 8. She received 2 units of platelets and 2 packets of platelets and discharged after 2 days. Bone marrow biopsy was also performed which showing 7% myeloblasts suggestive of myelodysplastic syndrome (report is found in patient's chart). She was referred to [facility] for further work-up and has been in [place] since Sunday living with her grand-daughter Labs were ordered by Dr Hopkins today and showed Hgb 6.1 and Plt of 7, prompting patient and family to report to ED. Direct ariana test was positive on o/p labs as well. Patient states that she is fatigued. She endorses easy bruising and has extensive bruising on her arms. She denies CP but endorses dyspnea on exertion. Denies fever/chills, HA, abdominal pain, nausea, vomiting. Patient's mother had colon cancer but she denies any other family  history of malignancy or autoimmune disorders. Patient is a smoker with 40 pack year history.    No past medical history on file.    No past surgical history on file.    Review of patient's allergies indicates:  No Known Allergies    No current facility-administered medications on file prior to encounter.      No current outpatient prescriptions on file prior to encounter.     Family History     None        Social History Main Topics    Smoking status: Not on file    Smokeless tobacco: Not on file    Alcohol use Not on file    Drug use: Unknown    Sexual activity: Not on file     Review of Systems   Constitutional: Positive for fatigue. Negative for chills, diaphoresis and fever.   HENT: Negative for trouble swallowing.    Eyes: Negative for visual disturbance.   Respiratory: Positive for cough and shortness of breath.    Cardiovascular: Negative for chest pain, palpitations and leg swelling.   Gastrointestinal: Negative for abdominal distention, abdominal pain, constipation, diarrhea, nausea and vomiting.   Genitourinary: Negative for dysuria and hematuria.   Musculoskeletal: Negative for arthralgias and back pain.   Skin: Positive for color change.   Neurological: Positive for weakness. Negative for dizziness, light-headedness and headaches.   Hematological: Bruises/bleeds easily.     Objective:     Vital Signs (Most Recent):  Temp: 98.8 °F (37.1 °C) (03/17/18 0320)  Pulse: 89 (03/17/18 0435)  Resp: (!) 22 (03/17/18 0435)  BP: (!) 144/63 (03/17/18 0320)  SpO2: 99 % (03/17/18 0435) Vital Signs (24h Range):  Temp:  [98.8 °F (37.1 °C)] 98.8 °F (37.1 °C)  Pulse:  [] 89  Resp:  [16-23] 22  SpO2:  [95 %-100 %] 99 %  BP: (111-222)/(63-88) 144/63     Weight: 77.1 kg (170 lb)  Body mass index is 29.18 kg/m².    Physical Exam   Constitutional: She is oriented to person, place, and time. She appears well-developed and well-nourished.   HENT:   Head: Normocephalic and atraumatic.   Right Ear: External ear normal.    Left Ear: External ear normal.   Eyes: EOM are normal.   Neck: Neck supple. No JVD present.   Cardiovascular: Normal rate, regular rhythm, normal heart sounds and intact distal pulses.    Pulmonary/Chest: Effort normal and breath sounds normal.   Abdominal: Soft. Bowel sounds are normal. There is no tenderness.   Musculoskeletal: She exhibits no edema.   Neurological: She is alert and oriented to person, place, and time.   Skin: Skin is warm and dry. Capillary refill takes less than 2 seconds. There is pallor.   Large bruises noted on arms    Psychiatric: She has a normal mood and affect. Her behavior is normal.         CRANIAL NERVES     CN III, IV, VI   Extraocular motions are normal.        Significant Labs: All pertinent labs within the past 24 hours have been reviewed.    Significant Imaging: I have reviewed all pertinent imaging results/findings within the past 24 hours.    Assessment/Plan:     * Autoimmune hemolytic anemia    - Direct ariana test positive on outpatient labs  - Check LDH, retic count, haptoglobin   - Will likely require steroids or IVIG   - Hem-Onc consulted          Tobacco abuse    - nicotine patch           Essential hypertension    - patient reports low blood pressure on current home regiment of nebivolol 5, irbesartan 300, and HCTZ 12.5. She sometimes holds irbesartan on her own   - resume nebivolol  - Hold HCTZ due to hypokalemia   - Can re-start irbesartan if BP not well controlled          MDS (myelodysplastic syndrome)    - Diagnosed recently at OSH via bone marrow biopsy. Report found in chart. 7% myeloblasts noted  - Has not seen Hem Onc at [facility] in person yet   - Hem-Onc consulted for further management        Thrombocytopenia    - could be due to MDS vs hemolytic process  - Will transfuse 1 packet of platelets  - Keep plts > 10  - hem/onc consulted            VTE Risk Mitigation         Ordered     IP VTE LOW RISK PATIENT  Once      03/17/18 0214     Place sequential compression  device  Until discontinued      03/17/18 0214     IP VTE HIGH RISK PATIENT  Once      03/17/18 0214             Alexander Perry MD  Department of Hospital Medicine   [facility]

## 2018-03-17 NOTE — ED NOTES
Patient up to restroom and back to room safely. NAD noted. Denies any current needs. Awaiting platelets and PRBC from blood bank and admission bed placement.

## 2018-03-17 NOTE — ED NOTES
Patient to ED with c/o low H&H drawn earlier today at her MD office. Patient alert and oriented x 4 with no acute distress noted at this time. Pt. On telemetry monitors with audible alarms. Patient with gabriella BP in right arm of 220/82 and in left arm of 140/82. Dr. La notified, chest x-ray ordered. No further nursing orders at this time. Pt. Denies any current needs. Call light is within reach. Pt. Safe on unit.

## 2018-03-17 NOTE — PROGRESS NOTES
Pt arrived to floor via stretcher, tele connected, RN x1, daughter at bedside. NAD noted. Pt able to ambulate to bed. VS stable. Bed low and locked, call light within reach, no complaints voiced. Will continue to monitor. Dhara Deluna RN

## 2018-03-17 NOTE — HPI
63 yo female with pmh of MDS (recently diagnosed), thrombocytopenia, anemia, HTN, asthma, and tobacco who presented to ED after outpatient labs indicated severe anemia and thrombocytopenia. Patient states that she was admitted to hospital in [place] last week after outpatient lab-work done by hematologist showed Hgb of 6 and platelets of 8. She received 2 units of platelets and 2 packets of platelets and discharged after 2 days. Bone marrow biopsy was also performed which showing 7% myeloblasts suggestive of myelodysplastic syndrome (report is found in patient's chart). She was referred to [facility] for further work-up and has been in [place] since Sunday living with her grand-daughter Labs were ordered by Dr Hopknis today and showed Hgb 6.1 and Plt of 7, prompting patient and family to report to ED. Direct ariana test was positive on o/p labs as well. Patient states that she is fatigued. She endorses easy bruising and has extensive bruising on her arms. She denies CP but endorses dyspnea on exertion. Denies fever/chills, HA, abdominal pain, nausea, vomiting. Patient's mother had colon cancer but she denies any other family history of malignancy or autoimmune disorders. Patient is a smoker with 40 pack year history.

## 2018-03-17 NOTE — HOSPITAL COURSE
Brooke Olivas was [place] [facility] [place] after lab work was concerning for MDS; hgb 5.0 g/dL on admission platelets 7k. Received 2 unit of platelets and 2 units PRBCs, with appropriate response in her Hgb though pletlet count failed to improve. Heme/Onc consulted to evaluate patient and recommended HLA matched platelets for thrombocytopenia then DC to home with close follow-up with Dr. Stephanie Hopkins.

## 2018-03-17 NOTE — ASSESSMENT & PLAN NOTE
- Diagnosed recently at [facility] via bone marrow biopsy. Report found in chart. 7% myeloblasts noted  - Has not seen Hem Onc at [facility] in person yet   - Hem-Onc consulted for further management

## 2018-03-17 NOTE — ASSESSMENT & PLAN NOTE
- Direct ariana test positive on outpatient labs  - Check LDH, retic count, haptoglobin   - Will likely require steroids or IVIG   - Hem-Onc consulted

## 2018-03-17 NOTE — ED NOTES
Patient up to restroom and back to room safely. VSS. NAD noted at this time. Pt. Has been updated on plan of care. Call light within reach. Pt. Remains safe on unit.

## 2018-03-17 NOTE — ASSESSMENT & PLAN NOTE
- could be due to MDS vs hemolytic process  - Will transfuse 1 packet of platelets  - Keep plts > 10  - hem/onc consulted

## 2018-03-17 NOTE — ED NOTES
Patient transport to room 325A on stretcher with this Rn. War room has verified telemetry is being monitored.

## 2018-03-17 NOTE — ASSESSMENT & PLAN NOTE
- patient reports low blood pressure on current home regiment of nebivolol 5, irbesartan 300, and HCTZ 12.5. She sometimes holds irbesartan on her own   - resume nebivolol  - Hold HCTZ due to hypokalemia   - Can re-start irbesartan if BP not well controlled

## 2018-03-18 NOTE — SUBJECTIVE & OBJECTIVE
Oncology Treatment Plan:   [No treatment plan]    Medications:  Continuous Infusions:  Scheduled Meds:   nebivolol  5 mg Oral Daily    nicotine  1 patch Transdermal Daily     PRN Meds:sodium chloride, sodium chloride, sodium chloride, sodium chloride, acetaminophen, albuterol, ondansetron, sodium chloride 0.9%     Review of patient's allergies indicates:   Allergen Reactions    Penicillins     Sulfa (sulfonamide antibiotics)         Past Medical History:   Diagnosis Date    Essential hypertension 3/17/2018    Mild asthma 3/17/2018    Smoker      No past surgical history on file.  Family History     None        Social History Main Topics    Smoking status: Not on file    Smokeless tobacco: Not on file    Alcohol use Not on file    Drug use: Unknown    Sexual activity: Not on file       Review of Systems   Constitutional: Negative for chills, fatigue and fever.   HENT: Negative for sore throat and trouble swallowing.    Eyes: Negative for photophobia, pain and visual disturbance.   Respiratory: Positive for cough. Negative for chest tightness and shortness of breath.    Cardiovascular: Negative for chest pain, palpitations and leg swelling.   Gastrointestinal: Negative for abdominal pain, constipation, diarrhea, nausea and vomiting.   Genitourinary: Negative for difficulty urinating and dysuria.   Musculoskeletal: Negative for arthralgias and back pain.   Skin: Negative for color change and rash.   Neurological: Negative for weakness, numbness and headaches.   Hematological: Bruises/bleeds easily.     Objective:     Vital Signs (Most Recent):  Temp: 98.1 °F (36.7 °C) (03/17/18 1958)  Pulse: 78 (03/17/18 1958)  Resp: 16 (03/17/18 1958)  BP: (!) 114/55 (03/17/18 1958)  SpO2: 97 % (03/17/18 1958) Vital Signs (24h Range):  Temp:  [97.4 °F (36.3 °C)-99.8 °F (37.7 °C)] 98.1 °F (36.7 °C)  Pulse:  [] 78  Resp:  [16-23] 16  SpO2:  [92 %-100 %] 97 %  BP: (106-222)/(55-90) 114/55     Weight: 77.1 kg (170  lb)  Body mass index is 29.18 kg/m².  Body surface area is 1.87 meters squared.      Intake/Output Summary (Last 24 hours) at 03/17/18 2018  Last data filed at 03/17/18 1400   Gross per 24 hour   Intake             3327 ml   Output                0 ml   Net             3327 ml       Physical Exam   Constitutional: She is oriented to person, place, and time. She appears well-developed and well-nourished. No distress.   HENT:   Head: Normocephalic and atraumatic.   Mouth/Throat: No oropharyngeal exudate.   Eyes: EOM are normal. Right eye exhibits no discharge. Left eye exhibits no discharge. No scleral icterus.   Cardiovascular: Normal rate, regular rhythm, normal heart sounds and intact distal pulses.  Exam reveals no gallop and no friction rub.    No murmur heard.  Pulmonary/Chest: Effort normal and breath sounds normal. No respiratory distress. She has no wheezes. She has no rales. She exhibits no tenderness.   Abdominal: Soft. Bowel sounds are normal. She exhibits no distension and no mass. There is no tenderness. There is no rebound and no guarding.   Musculoskeletal: Normal range of motion. She exhibits no edema or tenderness.   Neurological: She is alert and oriented to person, place, and time.   Skin: No rash noted. She is not diaphoretic. No erythema.   Patient with multiple bruises on her upper and lower extremities.   Psychiatric: She has a normal mood and affect. Her behavior is normal.       Significant Labs:   Recent Results (from the past 24 hour(s))   CBC auto differential    Collection Time: 03/16/18 11:28 PM   Result Value Ref Range    WBC 3.81 (L) 3.90 - 12.70 K/uL    RBC 1.52 (L) 4.00 - 5.40 M/uL    Hemoglobin 5.5 (LL) 12.0 - 16.0 g/dL    Hematocrit 15.4 (LL) 37.0 - 48.5 %     (H) 82 - 98 fL    MCH 36.2 (H) 27.0 - 31.0 pg    MCHC 35.7 32.0 - 36.0 g/dL    RDW 17.6 (H) 11.5 - 14.5 %    Platelets 7 (LL) 150 - 350 K/uL    MPV SEE COMMENT 9.2 - 12.9 fL    Immature Granulocytes CANCELED 0.0 - 0.5  %    Immature Grans (Abs) CANCELED 0.00 - 0.04 K/uL    nRBC 0 0 /100 WBC    Gran% 44.0 38.0 - 73.0 %    Lymph% 27.0 18.0 - 48.0 %    Mono% 4.0 4.0 - 15.0 %    Eosinophil% 0.0 0.0 - 8.0 %    Basophil% 0.0 0.0 - 1.9 %    Bands 2.0 %    Metamyelocytes 5.0 %    Myelocytes 17.0 %    Promyelocytes 1.0 %    Differential Method Manual    Comprehensive metabolic panel    Collection Time: 03/16/18 11:28 PM   Result Value Ref Range    Sodium 135 (L) 136 - 145 mmol/L    Potassium 3.1 (L) 3.5 - 5.1 mmol/L    Chloride 100 95 - 110 mmol/L    CO2 23 23 - 29 mmol/L    Glucose 137 (H) 70 - 110 mg/dL    BUN, Bld 17 8 - 23 mg/dL    Creatinine 0.8 0.5 - 1.4 mg/dL    Calcium 9.6 8.7 - 10.5 mg/dL    Total Protein 6.9 6.0 - 8.4 g/dL    Albumin 3.7 3.5 - 5.2 g/dL    Total Bilirubin 1.2 (H) 0.1 - 1.0 mg/dL    Alkaline Phosphatase 62 55 - 135 U/L    AST 14 10 - 40 U/L    ALT 12 10 - 44 U/L    Anion Gap 12 8 - 16 mmol/L    eGFR if African American >60.0 >60 mL/min/1.73 m^2    eGFR if non African American >60.0 >60 mL/min/1.73 m^2   Sedimentation rate, manual    Collection Time: 03/16/18 11:28 PM   Result Value Ref Range    Sed Rate 65 (H) 0 - 20 mm/Hr   C-reactive protein    Collection Time: 03/16/18 11:28 PM   Result Value Ref Range    .5 (H) 0.0 - 8.2 mg/L   Prepare RBC 4 Units; anemia    Collection Time: 03/17/18 12:14 AM   Result Value Ref Range    UNIT NUMBER [id number]     PRODUCT CODE C8366R22     DISPENSE STATUS ISSUED     CODING SYSTEM [id number]     Unit Blood Type Code 6200     Unit Blood Type A POS     Unit Expiration [id number]    Prepare Platelets 1 Dose    Collection Time: 03/17/18 12:14 AM   Result Value Ref Range    UNIT NUMBER [id number]     PRODUCT CODE U2178F49     DISPENSE STATUS ISSUED     CODING SYSTEM [id number]     Unit Blood Type Code 1700     Unit Blood Type B NEG     Unit Expiration [id number]    Urinalysis Only    Collection Time: 03/17/18 12:38 AM   Result Value Ref Range    Specimen UA Urine, Clean  Catch     Color, UA Yellow Yellow, Straw, Lucinda    Appearance, UA Clear Clear    pH, UA 7.0 5.0 - 8.0    Specific Gravity, UA 1.010 1.005 - 1.030    Protein, UA Negative Negative    Glucose, UA Negative Negative    Ketones, UA Negative Negative    Bilirubin (UA) Negative Negative    Occult Blood UA 1+ (A) Negative    Nitrite, UA Negative Negative    Urobilinogen, UA Negative <2.0 EU/dL    Leukocytes, UA Negative Negative   Type & Screen    Collection Time: 03/17/18 12:38 AM   Result Value Ref Range    Group & Rh A POS     Indirect [place]ana POS    Urinalysis Microscopic    Collection Time: 03/17/18 12:38 AM   Result Value Ref Range    RBC, UA 5 (H) 0 - 4 /hpf    WBC, UA 1 0 - 5 /hpf    Squam Epithel, UA 2 /hpf    Microscopic Comment SEE COMMENT    Prepare RBC 1 Unit    Collection Time: 03/17/18 12:38 AM   Result Value Ref Range    UNIT NUMBER [id number]     PRODUCT CODE T0382G08     DISPENSE STATUS ISSUED     CODING SYSTEM [id number]     Unit Blood Type Code 0600     Unit Blood Type A NEG     Unit Expiration [id number]    Prepare Platelets 1 Dose    Collection Time: 03/17/18 12:38 AM   Result Value Ref Range    UNIT NUMBER [id number]     PRODUCT CODE P4397D46     DISPENSE STATUS ISSUED     CODING SYSTEM SYYC280     Unit Blood Type Code 6200     Unit Blood Type A POS     Unit Expiration [id number]    Reticulocytes    Collection Time: 03/17/18  5:04 AM   Result Value Ref Range    Retic 1.0 0.5 - 2.5 %   Comprehensive metabolic panel    Collection Time: 03/17/18  5:04 AM   Result Value Ref Range    Sodium 136 136 - 145 mmol/L    Potassium 3.5 3.5 - 5.1 mmol/L    Chloride 102 95 - 110 mmol/L    CO2 24 23 - 29 mmol/L    Glucose 109 70 - 110 mg/dL    BUN, Bld 14 8 - 23 mg/dL    Creatinine 0.7 0.5 - 1.4 mg/dL    Calcium 8.7 8.7 - 10.5 mg/dL    Total Protein 6.4 6.0 - 8.4 g/dL    Albumin 3.5 3.5 - 5.2 g/dL    Total Bilirubin 1.0 0.1 - 1.0 mg/dL    Alkaline Phosphatase 57 55 - 135 U/L    AST 14 10 - 40 U/L    ALT 10 10 -  44 U/L    Anion Gap 10 8 - 16 mmol/L    eGFR if African American >60.0 >60 mL/min/1.73 m^2    eGFR if non African American >60.0 >60 mL/min/1.73 m^2   CBC auto differential    Collection Time: 03/17/18  5:04 AM   Result Value Ref Range    WBC 3.95 3.90 - 12.70 K/uL    RBC 1.39 (L) 4.00 - 5.40 M/uL    Hemoglobin 5.0 (LL) 12.0 - 16.0 g/dL    Hematocrit 14.3 (LL) 37.0 - 48.5 %     (H) 82 - 98 fL    MCH 36.0 (H) 27.0 - 31.0 pg    MCHC 35.0 32.0 - 36.0 g/dL    RDW 17.8 (H) 11.5 - 14.5 %    Platelets 7 (LL) 150 - 350 K/uL    MPV SEE COMMENT 9.2 - 12.9 fL    Immature Granulocytes CANCELED 0.0 - 0.5 %    Immature Grans (Abs) CANCELED 0.00 - 0.04 K/uL    Lymph # Test Not Performed 1.0 - 4.8 K/uL    Mono # Test Not Performed 0.3 - 1.0 K/uL    Eos # Test Not Performed 0.0 - 0.5 K/uL    Baso # Test Not Performed 0.00 - 0.20 K/uL    nRBC 0 0 /100 WBC    Gran% 38.0 38.0 - 73.0 %    Lymph% 29.0 18.0 - 48.0 %    Mono% 13.0 4.0 - 15.0 %    Eosinophil% 1.0 0.0 - 8.0 %    Basophil% 0.0 0.0 - 1.9 %    Bands 5.0 %    Metamyelocytes 5.0 %    Myelocytes 6.0 %    Other 3 %    Platelet Estimate Decreased (A)     Aniso Slight     Poik Slight     Poly Occasional     Ovalocytes Occasional     Smudge Cells Present     Differential Method Manual    Haptoglobin    Collection Time: 03/17/18  5:04 AM   Result Value Ref Range    Haptoglobin 189 30 - 250 mg/dL   Lactate dehydrogenase    Collection Time: 03/17/18  5:04 AM   Result Value Ref Range     (H) 110 - 260 U/L   Magnesium    Collection Time: 03/17/18 11:34 AM   Result Value Ref Range    Magnesium 2.2 1.6 - 2.6 mg/dL   CBC auto differential    Collection Time: 03/17/18  3:52 PM   Result Value Ref Range    WBC 4.08 3.90 - 12.70 K/uL    RBC 2.19 (L) 4.00 - 5.40 M/uL    Hemoglobin 7.3 (L) 12.0 - 16.0 g/dL    Hematocrit 20.8 (L) 37.0 - 48.5 %    MCV 95 82 - 98 fL    MCH 33.3 (H) 27.0 - 31.0 pg    MCHC 35.1 32.0 - 36.0 g/dL    RDW 18.1 (H) 11.5 - 14.5 %    Platelets 6 (LL) 150 - 350  K/uL    MPV SEE COMMENT 9.2 - 12.9 fL    Immature Granulocytes CANCELED 0.0 - 0.5 %    Immature Grans (Abs) CANCELED 0.00 - 0.04 K/uL    Lymph # CANCELED 1.0 - 4.8 K/uL    Mono # CANCELED 0.3 - 1.0 K/uL    Eos # CANCELED 0.0 - 0.5 K/uL    Baso # CANCELED 0.00 - 0.20 K/uL    nRBC 1 (A) 0 /100 WBC    Gran% 28.0 (L) 38.0 - 73.0 %    Lymph% 34.0 18.0 - 48.0 %    Mono% 8.0 4.0 - 15.0 %    Eosinophil% 0.0 0.0 - 8.0 %    Basophil% 1.0 0.0 - 1.9 %    Bands 3.0 %    Metamyelocytes 9.0 %    Myelocytes 10.0 %    Promyelocytes 2.0 %    Other 5 %    Differential Method Manual          Diagnostic Results:  I have reviewed all pertinent imaging results/findings within the past 24 hours.

## 2018-03-18 NOTE — CONSULTS
[facility]  Hematology/Oncology  Consult Note    Patient Name: Brooke Olivas  MRN: [id number]  Admission Date: 3/16/2018  Hospital Length of Stay: 0 days  Code Status: Full Code   Attending Provider: Jairo Null MD  Consulting Provider: Blaise Valle MD  Primary Care Physician: Primary Doctor No  Principal Problem:Autoimmune hemolytic anemia    Inpatient consult to Hematology/Oncology  Consult performed by: BLAISE VALLE  Consult ordered by: CALIN MULLINS        Subjective:     HPI:  Pt is a 63 yo F with h/o HTN who states she initially started having bruising around January 28th an her extremities and eventually presented to her PCP on February 12th for evaluation with blood work.  The patient states she was found to have platelets in the 25k range.  She eventually underwent a BM biopsy on 3/08/18 showing MDS with 7% blasts in the BM marrow.  The patient [place] [facility]  On lab check the patient was noted to have anemia with hemoglobin of  ~5 and plt count of 7k.  Currently the patient complains of cough easy bruising.  The patient denies fever, chills, night sweats, weight loss, new lumps or bumps.      Oncology Treatment Plan:   [No treatment plan]    Medications:  Continuous Infusions:  Scheduled Meds:   nebivolol  5 mg Oral Daily    nicotine  1 patch Transdermal Daily     PRN Meds:sodium chloride, sodium chloride, sodium chloride, sodium chloride, acetaminophen, albuterol, ondansetron, sodium chloride 0.9%     Review of patient's allergies indicates:   Allergen Reactions    Penicillins     Sulfa (sulfonamide antibiotics)         Past Medical History:   Diagnosis Date    Essential hypertension 3/17/2018    Mild asthma 3/17/2018    Smoker      No past surgical history on file.  Family History     None        Social History Main Topics    Smoking status: Not on file    Smokeless tobacco: Not on file    Alcohol use Not on file     Drug use: Unknown    Sexual activity: Not on file       Review of Systems   Constitutional: Negative for chills, fatigue and fever.   HENT: Negative for sore throat and trouble swallowing.    Eyes: Negative for photophobia, pain and visual disturbance.   Respiratory: Positive for cough. Negative for chest tightness and shortness of breath.    Cardiovascular: Negative for chest pain, palpitations and leg swelling.   Gastrointestinal: Negative for abdominal pain, constipation, diarrhea, nausea and vomiting.   Genitourinary: Negative for difficulty urinating and dysuria.   Musculoskeletal: Negative for arthralgias and back pain.   Skin: Negative for color change and rash.   Neurological: Negative for weakness, numbness and headaches.   Hematological: Bruises/bleeds easily.     Objective:     Vital Signs (Most Recent):  Temp: 98.1 °F (36.7 °C) (03/17/18 1958)  Pulse: 78 (03/17/18 1958)  Resp: 16 (03/17/18 1958)  BP: (!) 114/55 (03/17/18 1958)  SpO2: 97 % (03/17/18 1958) Vital Signs (24h Range):  Temp:  [97.4 °F (36.3 °C)-99.8 °F (37.7 °C)] 98.1 °F (36.7 °C)  Pulse:  [] 78  Resp:  [16-23] 16  SpO2:  [92 %-100 %] 97 %  BP: (106-222)/(55-90) 114/55     Weight: 77.1 kg (170 lb)  Body mass index is 29.18 kg/m².  Body surface area is 1.87 meters squared.      Intake/Output Summary (Last 24 hours) at 03/17/18 2018  Last data filed at 03/17/18 1400   Gross per 24 hour   Intake             3327 ml   Output                0 ml   Net             3327 ml       Physical Exam   Constitutional: She is oriented to person, place, and time. She appears well-developed and well-nourished. No distress.   HENT:   Head: Normocephalic and atraumatic.   Mouth/Throat: No oropharyngeal exudate.   Eyes: EOM are normal. Right eye exhibits no discharge. Left eye exhibits no discharge. No scleral icterus.   Cardiovascular: Normal rate, regular rhythm, normal heart sounds and intact distal pulses.  Exam reveals no gallop and no friction rub.     No murmur heard.  Pulmonary/Chest: Effort normal and breath sounds normal. No respiratory distress. She has no wheezes. She has no rales. She exhibits no tenderness.   Abdominal: Soft. Bowel sounds are normal. She exhibits no distension and no mass. There is no tenderness. There is no rebound and no guarding.   Musculoskeletal: Normal range of motion. She exhibits no edema or tenderness.   Neurological: She is alert and oriented to person, place, and time.   Skin: No rash noted. She is not diaphoretic. No erythema.   Patient with multiple bruises on her upper and lower extremities.   Psychiatric: She has a normal mood and affect. Her behavior is normal.       Significant Labs:   Recent Results (from the past 24 hour(s))   CBC auto differential    Collection Time: 03/16/18 11:28 PM   Result Value Ref Range    WBC 3.81 (L) 3.90 - 12.70 K/uL    RBC 1.52 (L) 4.00 - 5.40 M/uL    Hemoglobin 5.5 (LL) 12.0 - 16.0 g/dL    Hematocrit 15.4 (LL) 37.0 - 48.5 %     (H) 82 - 98 fL    MCH 36.2 (H) 27.0 - 31.0 pg    MCHC 35.7 32.0 - 36.0 g/dL    RDW 17.6 (H) 11.5 - 14.5 %    Platelets 7 (LL) 150 - 350 K/uL    MPV SEE COMMENT 9.2 - 12.9 fL    Immature Granulocytes CANCELED 0.0 - 0.5 %    Immature Grans (Abs) CANCELED 0.00 - 0.04 K/uL    nRBC 0 0 /100 WBC    Gran% 44.0 38.0 - 73.0 %    Lymph% 27.0 18.0 - 48.0 %    Mono% 4.0 4.0 - 15.0 %    Eosinophil% 0.0 0.0 - 8.0 %    Basophil% 0.0 0.0 - 1.9 %    Bands 2.0 %    Metamyelocytes 5.0 %    Myelocytes 17.0 %    Promyelocytes 1.0 %    Differential Method Manual    Comprehensive metabolic panel    Collection Time: 03/16/18 11:28 PM   Result Value Ref Range    Sodium 135 (L) 136 - 145 mmol/L    Potassium 3.1 (L) 3.5 - 5.1 mmol/L    Chloride 100 95 - 110 mmol/L    CO2 23 23 - 29 mmol/L    Glucose 137 (H) 70 - 110 mg/dL    BUN, Bld 17 8 - 23 mg/dL    Creatinine 0.8 0.5 - 1.4 mg/dL    Calcium 9.6 8.7 - 10.5 mg/dL    Total Protein 6.9 6.0 - 8.4 g/dL    Albumin 3.7 3.5 - 5.2 g/dL     Total Bilirubin 1.2 (H) 0.1 - 1.0 mg/dL    Alkaline Phosphatase 62 55 - 135 U/L    AST 14 10 - 40 U/L    ALT 12 10 - 44 U/L    Anion Gap 12 8 - 16 mmol/L    eGFR if African American >60.0 >60 mL/min/1.73 m^2    eGFR if non African American >60.0 >60 mL/min/1.73 m^2   Sedimentation rate, manual    Collection Time: 03/16/18 11:28 PM   Result Value Ref Range    Sed Rate 65 (H) 0 - 20 mm/Hr   C-reactive protein    Collection Time: 03/16/18 11:28 PM   Result Value Ref Range    .5 (H) 0.0 - 8.2 mg/L   Prepare RBC 4 Units; anemia    Collection Time: 03/17/18 12:14 AM   Result Value Ref Range    UNIT NUMBER [id number]     PRODUCT CODE L8662F66     DISPENSE STATUS ISSUED     CODING SYSTEM ZPTA150     Unit Blood Type Code 6200     Unit Blood Type A POS     Unit Expiration [id number]    Prepare Platelets 1 Dose    Collection Time: 03/17/18 12:14 AM   Result Value Ref Range    UNIT NUMBER [id number]     PRODUCT CODE B2843N71     DISPENSE STATUS ISSUED     CODING SYSTEM OWBE810     Unit Blood Type Code 1700     Unit Blood Type B NEG     Unit Expiration [id number]    Urinalysis Only    Collection Time: 03/17/18 12:38 AM   Result Value Ref Range    Specimen UA Urine, Clean Catch     Color, UA Yellow Yellow, Straw, Lucinda    Appearance, UA Clear Clear    pH, UA 7.0 5.0 - 8.0    Specific Gravity, UA 1.010 1.005 - 1.030    Protein, UA Negative Negative    Glucose, UA Negative Negative    Ketones, UA Negative Negative    Bilirubin (UA) Negative Negative    Occult Blood UA 1+ (A) Negative    Nitrite, UA Negative Negative    Urobilinogen, UA Negative <2.0 EU/dL    Leukocytes, UA Negative Negative   Type & Screen    Collection Time: 03/17/18 12:38 AM   Result Value Ref Range    Group & Rh A POS     Indirect Keyana POS    Urinalysis Microscopic    Collection Time: 03/17/18 12:38 AM   Result Value Ref Range    RBC, UA 5 (H) 0 - 4 /hpf    WBC, UA 1 0 - 5 /hpf    Squam Epithel, UA 2 /hpf    Microscopic Comment SEE COMMENT     Prepare RBC 1 Unit    Collection Time: 03/17/18 12:38 AM   Result Value Ref Range    UNIT NUMBER [id number]     PRODUCT CODE L5285B52     DISPENSE STATUS ISSUED     CODING SYSTEM JHZR838     Unit Blood Type Code 0600     Unit Blood Type A NEG     Unit Expiration [id number]    Prepare Platelets 1 Dose    Collection Time: 03/17/18 12:38 AM   Result Value Ref Range    UNIT NUMBER [id number]     PRODUCT CODE R6088F47     DISPENSE STATUS ISSUED     CODING SYSTEM LHQJ095     Unit Blood Type Code 6200     Unit Blood Type A POS     Unit Expiration [id number]    Reticulocytes    Collection Time: 03/17/18  5:04 AM   Result Value Ref Range    Retic 1.0 0.5 - 2.5 %   Comprehensive metabolic panel    Collection Time: 03/17/18  5:04 AM   Result Value Ref Range    Sodium 136 136 - 145 mmol/L    Potassium 3.5 3.5 - 5.1 mmol/L    Chloride 102 95 - 110 mmol/L    CO2 24 23 - 29 mmol/L    Glucose 109 70 - 110 mg/dL    BUN, Bld 14 8 - 23 mg/dL    Creatinine 0.7 0.5 - 1.4 mg/dL    Calcium 8.7 8.7 - 10.5 mg/dL    Total Protein 6.4 6.0 - 8.4 g/dL    Albumin 3.5 3.5 - 5.2 g/dL    Total Bilirubin 1.0 0.1 - 1.0 mg/dL    Alkaline Phosphatase 57 55 - 135 U/L    AST 14 10 - 40 U/L    ALT 10 10 - 44 U/L    Anion Gap 10 8 - 16 mmol/L    eGFR if African American >60.0 >60 mL/min/1.73 m^2    eGFR if non African American >60.0 >60 mL/min/1.73 m^2   CBC auto differential    Collection Time: 03/17/18  5:04 AM   Result Value Ref Range    WBC 3.95 3.90 - 12.70 K/uL    RBC 1.39 (L) 4.00 - 5.40 M/uL    Hemoglobin 5.0 (LL) 12.0 - 16.0 g/dL    Hematocrit 14.3 (LL) 37.0 - 48.5 %     (H) 82 - 98 fL    MCH 36.0 (H) 27.0 - 31.0 pg    MCHC 35.0 32.0 - 36.0 g/dL    RDW 17.8 (H) 11.5 - 14.5 %    Platelets 7 (LL) 150 - 350 K/uL    MPV SEE COMMENT 9.2 - 12.9 fL    Immature Granulocytes CANCELED 0.0 - 0.5 %    Immature Grans (Abs) CANCELED 0.00 - 0.04 K/uL    Lymph # Test Not Performed 1.0 - 4.8 K/uL    Mono # Test Not Performed 0.3 - 1.0 K/uL     Eos # Test Not Performed 0.0 - 0.5 K/uL    Baso # Test Not Performed 0.00 - 0.20 K/uL    nRBC 0 0 /100 WBC    Gran% 38.0 38.0 - 73.0 %    Lymph% 29.0 18.0 - 48.0 %    Mono% 13.0 4.0 - 15.0 %    Eosinophil% 1.0 0.0 - 8.0 %    Basophil% 0.0 0.0 - 1.9 %    Bands 5.0 %    Metamyelocytes 5.0 %    Myelocytes 6.0 %    Other 3 %    Platelet Estimate Decreased (A)     Aniso Slight     Poik Slight     Poly Occasional     Ovalocytes Occasional     Smudge Cells Present     Differential Method Manual    Haptoglobin    Collection Time: 03/17/18  5:04 AM   Result Value Ref Range    Haptoglobin 189 30 - 250 mg/dL   Lactate dehydrogenase    Collection Time: 03/17/18  5:04 AM   Result Value Ref Range     (H) 110 - 260 U/L   Magnesium    Collection Time: 03/17/18 11:34 AM   Result Value Ref Range    Magnesium 2.2 1.6 - 2.6 mg/dL   CBC auto differential    Collection Time: 03/17/18  3:52 PM   Result Value Ref Range    WBC 4.08 3.90 - 12.70 K/uL    RBC 2.19 (L) 4.00 - 5.40 M/uL    Hemoglobin 7.3 (L) 12.0 - 16.0 g/dL    Hematocrit 20.8 (L) 37.0 - 48.5 %    MCV 95 82 - 98 fL    MCH 33.3 (H) 27.0 - 31.0 pg    MCHC 35.1 32.0 - 36.0 g/dL    RDW 18.1 (H) 11.5 - 14.5 %    Platelets 6 (LL) 150 - 350 K/uL    MPV SEE COMMENT 9.2 - 12.9 fL    Immature Granulocytes CANCELED 0.0 - 0.5 %    Immature Grans (Abs) CANCELED 0.00 - 0.04 K/uL    Lymph # CANCELED 1.0 - 4.8 K/uL    Mono # CANCELED 0.3 - 1.0 K/uL    Eos # CANCELED 0.0 - 0.5 K/uL    Baso # CANCELED 0.00 - 0.20 K/uL    nRBC 1 (A) 0 /100 WBC    Gran% 28.0 (L) 38.0 - 73.0 %    Lymph% 34.0 18.0 - 48.0 %    Mono% 8.0 4.0 - 15.0 %    Eosinophil% 0.0 0.0 - 8.0 %    Basophil% 1.0 0.0 - 1.9 %    Bands 3.0 %    Metamyelocytes 9.0 %    Myelocytes 10.0 %    Promyelocytes 2.0 %    Other 5 %    Differential Method Manual          Diagnostic Results:  I have reviewed all pertinent imaging results/findings within the past 24 hours.    Assessment/Plan:     MDS (myelodysplastic syndrome)    -Patient with  recent diagnosis of MDS s/p BM biopsy o 3/08/18  -Would treat with supportive transfusions for hemoglobin goal >7g/dL and platelet goal >10k  -Will have the patient set up for a clinic appt early next week with Dr Hopkins.  -Will monitor pt counts  -No inpatient treatment indicated at this time.            Thank you for your consult. I will follow-up with patient. Please contact us if you have any additional questions.    Blaise Valle MD  Hematology/Oncology  [facility]      STAFF NOTE:  I have personally taken the history and examined this patient and agree with Dr. Valle's Note as stated above.

## 2018-03-18 NOTE — ASSESSMENT & PLAN NOTE
-Patient with recent diagnosis of MDS s/p BM biopsy o 3/08/18  -Would treat with supportive transfusions for hemoglobin goal >7g/dL and platelet goal >10k  -Will have the patient set up for a clinic appt early next week with Dr Hopkins.  -Will monitor pt counts  -No inpatient treatment indicated at this time.

## 2018-03-18 NOTE — PLAN OF CARE
Plan of care reviewed with pt and spouse. VS stable. Pt with critical labs this AM and orders to transfuse. No acute events at this time. Afrin ordered for nose bleed. Goal Platelet >10, Hgb >7. Pt remains free from falls or injury. Bed low and locked, call light and personal belongings within reach. Will continue to monitor. Dhara Deluna RN

## 2018-03-18 NOTE — PROGRESS NOTES
[facility] Hospital Medicine  Progress Note    Patient Name: Brooke Olivas  MRN: [id number]  Patient Class: IP- Inpatient   Admission Date: 3/16/2018  Length of Stay: 1 days  Attending Physician: Jairo Null MD  Primary Care Provider: Primary Doctor [facility] Medicine Team: [facility] HOSP MED 3 Darrel Richmond MD    Subjective:     Principal Problem:MDS (myelodysplastic syndrome)    HPI:  61 yo female with pmh of MDS (recently diagnosed), thrombocytopenia, anemia, HTN, asthma, and tobacco who presented to ED after outpatient labs indicated severe anemia and thrombocytopenia. Patient states that she was admitted to hospital in [place] last week after outpatient lab-work done by hematologist showed Hgb of 6 and platelets of 8. She received 2 units of platelets and 2 packets of platelets and discharged after 2 days. Bone marrow biopsy was also performed which showing 7% myeloblasts suggestive of myelodysplastic syndrome (report is found in patient's chart). She was referred to [facility] for further work-up and has been in [place] since Sunday living with her grand-daughter Labs were ordered by Dr Hopkins today and showed Hgb 6.1 and Plt of 7, prompting patient and family to report to ED. Direct ariana test was positive on o/p labs as well. Patient states that she is fatigued. She endorses easy bruising and has extensive bruising on her arms. She denies CP but endorses dyspnea on exertion. Denies fever/chills, HA, abdominal pain, nausea, vomiting. Patient's mother had colon cancer but she denies any other family history of malignancy or autoimmune disorders. Patient is a smoker with 40 pack year history.    Hospital Course:  Brooke Olivas was admitted to [facility] from OSH after lab work was concerning for MDS; hgb 5.0 g/dL on admission platelets 7k. Received 2 unit of platelets and 2 units PRBCs, with appropriate response in her Hgb though pletlet count failed to improve. Heme/Onc  consulted to evaluate patient and recommended HLA matched platelets for thrombocytopenia then DC to home with close follow-up with Dr. Stephanie Hopkins.     Interval History: WALDO. Will remain inpatient for HLA matched platelet transfusion     Review of Systems   Constitutional: Negative for chills, fatigue and fever.   HENT: Negative for sore throat and trouble swallowing.    Eyes: Negative for photophobia, pain and visual disturbance.   Respiratory: Positive for cough. Negative for chest tightness and shortness of breath.    Cardiovascular: Negative for chest pain, palpitations and leg swelling.   Gastrointestinal: Negative for abdominal pain, constipation, diarrhea, nausea and vomiting.   Genitourinary: Negative for difficulty urinating and dysuria.   Musculoskeletal: Negative for arthralgias and back pain.   Skin: Negative for color change and rash.   Neurological: Negative for weakness, numbness and headaches.   Hematological: Bruises/bleeds easily.     Objective:     Vital Signs (Most Recent):  Temp: 99.6 °F (37.6 °C) (03/18/18 1501)  Pulse: 74 (03/18/18 1501)  Resp: 18 (03/18/18 1501)  BP: (!) 113/57 (03/18/18 1501)  SpO2: 95 % (03/18/18 1501) Vital Signs (24h Range):  Temp:  [98.1 °F (36.7 °C)-100 °F (37.8 °C)] 99.6 °F (37.6 °C)  Pulse:  [70-92] 74  Resp:  [16-18] 18  SpO2:  [91 %-97 %] 95 %  BP: ()/(54-69) 113/57     Weight: 77.1 kg (170 lb)  Body mass index is 29.18 kg/m².    Intake/Output Summary (Last 24 hours) at 03/18/18 1717  Last data filed at 03/18/18 1501   Gross per 24 hour   Intake             3771 ml   Output                0 ml   Net             3771 ml      Physical Exam   Constitutional: She is oriented to person, place, and time. She appears well-developed and well-nourished. No distress.   HENT:   Head: Normocephalic and atraumatic.   Mouth/Throat: No oropharyngeal exudate.   Eyes: EOM are normal. Right eye exhibits no discharge. Left eye exhibits no discharge. No scleral icterus.    Cardiovascular: Normal rate, regular rhythm, normal heart sounds and intact distal pulses.  Exam reveals no gallop and no friction rub.    No murmur heard.  Pulmonary/Chest: Effort normal and breath sounds normal. No respiratory distress. She has no wheezes. She has no rales. She exhibits no tenderness.   Abdominal: Soft. Bowel sounds are normal. She exhibits no distension and no mass. There is no tenderness. There is no rebound and no guarding.   Musculoskeletal: Normal range of motion. She exhibits no edema or tenderness.   Neurological: She is alert and oriented to person, place, and time.   Skin: No rash noted. She is not diaphoretic. No erythema.   Patient with multiple bruises on her upper and lower extremities.   Psychiatric: She has a normal mood and affect. Her behavior is normal.       Significant Labs:   Recent Results (from the past 24 hour(s))   Basic metabolic panel    Collection Time: 03/18/18  4:04 AM   Result Value Ref Range    Sodium 137 136 - 145 mmol/L    Potassium 3.4 (L) 3.5 - 5.1 mmol/L    Chloride 104 95 - 110 mmol/L    CO2 24 23 - 29 mmol/L    Glucose 116 (H) 70 - 110 mg/dL    BUN, Bld 12 8 - 23 mg/dL    Creatinine 0.7 0.5 - 1.4 mg/dL    Calcium 8.8 8.7 - 10.5 mg/dL    Anion Gap 9 8 - 16 mmol/L    eGFR if African American >60.0 >60 mL/min/1.73 m^2    eGFR if non African American >60.0 >60 mL/min/1.73 m^2   CBC auto differential    Collection Time: 03/18/18  4:04 AM   Result Value Ref Range    WBC 3.33 (L) 3.90 - 12.70 K/uL    RBC 2.07 (L) 4.00 - 5.40 M/uL    Hemoglobin 6.9 (L) 12.0 - 16.0 g/dL    Hematocrit 19.8 (LL) 37.0 - 48.5 %    MCV 96 82 - 98 fL    MCH 33.3 (H) 27.0 - 31.0 pg    MCHC 34.8 32.0 - 36.0 g/dL    RDW 18.6 (H) 11.5 - 14.5 %    Platelets 6 (LL) 150 - 350 K/uL    MPV SEE COMMENT 9.2 - 12.9 fL    Immature Granulocytes CANCELED 0.0 - 0.5 %    Immature Grans (Abs) CANCELED 0.00 - 0.04 K/uL    nRBC 1 (A) 0 /100 WBC    Gran% 36.0 (L) 38.0 - 73.0 %    Lymph% 45.0 18.0 - 48.0 %     Mono% 13.0 4.0 - 15.0 %    Eosinophil% 1.0 0.0 - 8.0 %    Basophil% 1.0 0.0 - 1.9 %    Bands 1.0 %    Metamyelocytes 3.0 %    Platelet Estimate Decreased (A)     Aniso Moderate     Poly Occasional     Differential Method Manual    CBC Oncology    Collection Time: 03/18/18  4:12 PM   Result Value Ref Range    WBC 2.36 (L) 3.90 - 12.70 K/uL    RBC 1.83 (L) 4.00 - 5.40 M/uL    Hemoglobin 6.1 (L) 12.0 - 16.0 g/dL    Hematocrit 17.1 (LL) 37.0 - 48.5 %    MCV 93 82 - 98 fL    MCH 33.3 (H) 27.0 - 31.0 pg    MCHC 35.7 32.0 - 36.0 g/dL    RDW 18.3 (H) 11.5 - 14.5 %    Platelets 4 (LL) 150 - 350 K/uL    MPV SEE COMMENT 9.2 - 12.9 fL    Gran # (ANC) 0.7 (L) 1.8 - 7.7 K/uL    Immature Grans (Abs) 0.64 (H) 0.00 - 0.04 K/uL     Significant Imaging:   CXR 03/17/2018:  No acute intrathoracic process identified.    Assessment/Plan:      * MDS (myelodysplastic syndrome)    - Diagnosed recently at OSH via bone marrow biopsy. Report found in chart. 7% myeloblasts noted  - Has not seen Hem Onc at [facility] in person yet   - Hem-Onc following, recommending supportive transfusions for hemoglobin goal >7g/dL and platelet goal >10k  -s/p 2u PRBCs and 2u platelets with appropriate response to hgb though platelet count failing to respond as such will remain hospitalized for HLA matched platelet transfusion       Thrombocytopenia, unspecified    S/p 2u platelet transfusion without appropriate response in platelet count  -Heme/onc following, recommending HLA matched platelets  -Transfuse to a goal of greater than 10k       Mild asthma    - well controlled   -Albuterol PRN       Tobacco abuse    - nicotine patch       Essential hypertension    -patient reports low blood pressure on current home regiment of nebivolol 5, irbesartan 300, and HCTZ 12.5. She sometimes holds irbesartan on her own   - resume nebivolol  - Can re-start irbesartan if BP not well controlled        VTE Risk Mitigation         Ordered     IP VTE LOW RISK PATIENT  Once       03/17/18 0214     Place sequential compression device  Until discontinued      03/17/18 0214     IP VTE HIGH RISK PATIENT  Once      03/17/18 0214        Darrel Richmond MD  Department of Hospital Medicine   [facility]

## 2018-03-18 NOTE — PLAN OF CARE
Problem: Patient Care Overview  Goal: Plan of Care Review  Outcome: Ongoing (interventions implemented as appropriate)  POC reviewed w/ pt and spouse this am to include possible blood products, skin surveillance, safety, and possible discharge.  1 unit of platelets and 1 unit of PRBCs ordered early this am, held per verbal instruction from primary team.  Pt has diffuse bruising noted.  Pt received 1 unit of platelets this afternoon, 1 hour post transfusion CBC drawn - H&H = 6.1/17.1 and platelets = 4.  Pathology consulted.  No episodes of epistaxis so far today.  Pt emotionally labile d/t new diagnosis.

## 2018-03-18 NOTE — SUBJECTIVE & OBJECTIVE
Interval History: WALDO. Will remain inpatient for HLA matched platelet transfusion     Review of Systems   Constitutional: Negative for chills, fatigue and fever.   HENT: Negative for sore throat and trouble swallowing.    Eyes: Negative for photophobia, pain and visual disturbance.   Respiratory: Positive for cough. Negative for chest tightness and shortness of breath.    Cardiovascular: Negative for chest pain, palpitations and leg swelling.   Gastrointestinal: Negative for abdominal pain, constipation, diarrhea, nausea and vomiting.   Genitourinary: Negative for difficulty urinating and dysuria.   Musculoskeletal: Negative for arthralgias and back pain.   Skin: Negative for color change and rash.   Neurological: Negative for weakness, numbness and headaches.   Hematological: Bruises/bleeds easily.     Objective:     Vital Signs (Most Recent):  Temp: 99.6 °F (37.6 °C) (03/18/18 1501)  Pulse: 74 (03/18/18 1501)  Resp: 18 (03/18/18 1501)  BP: (!) 113/57 (03/18/18 1501)  SpO2: 95 % (03/18/18 1501) Vital Signs (24h Range):  Temp:  [98.1 °F (36.7 °C)-100 °F (37.8 °C)] 99.6 °F (37.6 °C)  Pulse:  [70-92] 74  Resp:  [16-18] 18  SpO2:  [91 %-97 %] 95 %  BP: ()/(54-69) 113/57     Weight: 77.1 kg (170 lb)  Body mass index is 29.18 kg/m².    Intake/Output Summary (Last 24 hours) at 03/18/18 1717  Last data filed at 03/18/18 1501   Gross per 24 hour   Intake             3771 ml   Output                0 ml   Net             3771 ml      Physical Exam   Constitutional: She is oriented to person, place, and time. She appears well-developed and well-nourished. No distress.   HENT:   Head: Normocephalic and atraumatic.   Mouth/Throat: No oropharyngeal exudate.   Eyes: EOM are normal. Right eye exhibits no discharge. Left eye exhibits no discharge. No scleral icterus.   Cardiovascular: Normal rate, regular rhythm, normal heart sounds and intact distal pulses.  Exam reveals no gallop and no friction rub.    No murmur  heard.  Pulmonary/Chest: Effort normal and breath sounds normal. No respiratory distress. She has no wheezes. She has no rales. She exhibits no tenderness.   Abdominal: Soft. Bowel sounds are normal. She exhibits no distension and no mass. There is no tenderness. There is no rebound and no guarding.   Musculoskeletal: Normal range of motion. She exhibits no edema or tenderness.   Neurological: She is alert and oriented to person, place, and time.   Skin: No rash noted. She is not diaphoretic. No erythema.   Patient with multiple bruises on her upper and lower extremities.   Psychiatric: She has a normal mood and affect. Her behavior is normal.       Significant Labs:   Recent Results (from the past 24 hour(s))   Basic metabolic panel    Collection Time: 03/18/18  4:04 AM   Result Value Ref Range    Sodium 137 136 - 145 mmol/L    Potassium 3.4 (L) 3.5 - 5.1 mmol/L    Chloride 104 95 - 110 mmol/L    CO2 24 23 - 29 mmol/L    Glucose 116 (H) 70 - 110 mg/dL    BUN, Bld 12 8 - 23 mg/dL    Creatinine 0.7 0.5 - 1.4 mg/dL    Calcium 8.8 8.7 - 10.5 mg/dL    Anion Gap 9 8 - 16 mmol/L    eGFR if African American >60.0 >60 mL/min/1.73 m^2    eGFR if non African American >60.0 >60 mL/min/1.73 m^2   CBC auto differential    Collection Time: 03/18/18  4:04 AM   Result Value Ref Range    WBC 3.33 (L) 3.90 - 12.70 K/uL    RBC 2.07 (L) 4.00 - 5.40 M/uL    Hemoglobin 6.9 (L) 12.0 - 16.0 g/dL    Hematocrit 19.8 (LL) 37.0 - 48.5 %    MCV 96 82 - 98 fL    MCH 33.3 (H) 27.0 - 31.0 pg    MCHC 34.8 32.0 - 36.0 g/dL    RDW 18.6 (H) 11.5 - 14.5 %    Platelets 6 (LL) 150 - 350 K/uL    MPV SEE COMMENT 9.2 - 12.9 fL    Immature Granulocytes CANCELED 0.0 - 0.5 %    Immature Grans (Abs) CANCELED 0.00 - 0.04 K/uL    nRBC 1 (A) 0 /100 WBC    Gran% 36.0 (L) 38.0 - 73.0 %    Lymph% 45.0 18.0 - 48.0 %    Mono% 13.0 4.0 - 15.0 %    Eosinophil% 1.0 0.0 - 8.0 %    Basophil% 1.0 0.0 - 1.9 %    Bands 1.0 %    Metamyelocytes 3.0 %    Platelet Estimate  Decreased (A)     Aniso Moderate     Poly Occasional     Differential Method Manual    CBC Oncology    Collection Time: 03/18/18  4:12 PM   Result Value Ref Range    WBC 2.36 (L) 3.90 - 12.70 K/uL    RBC 1.83 (L) 4.00 - 5.40 M/uL    Hemoglobin 6.1 (L) 12.0 - 16.0 g/dL    Hematocrit 17.1 (LL) 37.0 - 48.5 %    MCV 93 82 - 98 fL    MCH 33.3 (H) 27.0 - 31.0 pg    MCHC 35.7 32.0 - 36.0 g/dL    RDW 18.3 (H) 11.5 - 14.5 %    Platelets 4 (LL) 150 - 350 K/uL    MPV SEE COMMENT 9.2 - 12.9 fL    Gran # (ANC) 0.7 (L) 1.8 - 7.7 K/uL    Immature Grans (Abs) 0.64 (H) 0.00 - 0.04 K/uL     Significant Imaging:   CXR 03/17/2018:  No acute intrathoracic process identified.

## 2018-03-18 NOTE — HPI
Pt is a 63 yo F with h/o HTN who states she initially started having bruising around January 28th an her extremities and eventually presented to her PCP on February 12th for evaluation with blood work.  The patient states she was found to have platelets in the 25k range.  She eventually underwent a BM biopsy on 3/08/18 showing MDS with 7% blasts in the BM marrow.  The patient was to establish care with Dr Hopkins at [facility].  On lab check the patient was noted to have anemia with hemoglobin of  ~5 and plt count of 7k.  Currently the patient complains of cough easy bruising.  The patient denies fever, chills, night sweats, weight loss, new lumps or bumps.

## 2018-03-19 PROBLEM — D64.9 ANEMIA: Status: ACTIVE | Noted: 2018-01-01

## 2018-03-19 NOTE — PROGRESS NOTES
[facility] Hospital Medicine  Progress Note    Patient Name: Brooke Olivas  MRN: [id number]  Patient Class: IP- Inpatient   Admission Date: 3/16/2018  Length of Stay: 2 days  Attending Physician: Jairo Null MD  Primary Care Provider: Primary Doctor [facility] Medicine Team: [facility] HOSP MED 3 Darrel Richmond MD    Subjective:     Principal Problem:MDS (myelodysplastic syndrome)    HPI:  63 yo female with pmh of MDS (recently diagnosed), thrombocytopenia, anemia, HTN, asthma, and tobacco who presented to ED after outpatient labs indicated severe anemia and thrombocytopenia. Patient states that she was admitted to hospital in [place] last week after outpatient lab-work done by hematologist showed Hgb of 6 and platelets of 8. She received 2 units of platelets and 2 packets of platelets and discharged after 2 days. Bone marrow biopsy was also performed which showing 7% myeloblasts suggestive of myelodysplastic syndrome (report is found in patient's chart). She was referred to [facility] for further work-up and has been in [place] since Sunday living with her grand-daughter Labs were ordered by Dr Hopkins today and showed Hgb 6.1 and Plt of 7, prompting patient and family to report to ED. Direct ariana test was positive on o/p labs as well. Patient states that she is fatigued. She endorses easy bruising and has extensive bruising on her arms. She denies CP but endorses dyspnea on exertion. Denies fever/chills, HA, abdominal pain, nausea, vomiting. Patient's mother had colon cancer but she denies any other family history of malignancy or autoimmune disorders. Patient is a smoker with 40 pack year history.    Hospital Course:  Brooke Olivas was admitted to [facility] from OSH after lab work was concerning for MDS; hgb 5.0 g/dL on admission platelets 7k. Received 3 unit of platelets and 3 units PRBCs, with appropriate response in her Hgb though pletlet count failed to improve. Heme/Onc  consulted to evaluate patient and recommended HLA matched platelets for thrombocytopenia then DC to home with close follow-up with Dr. Stephanie Hopkins.     Interval History: NAEON. Hgb remains low at 6.2 g/dL stable from prior which was 6.1g/dL. Will transfuse if patient becomes symptomatically anemic. Will be conservative with blood products, however, platelets continue to be low. Will consider transfusing 1 additional unit of platelets this AM if platelet count has again fallen.     Review of Systems   Constitutional: Negative for chills, fatigue and fever.   HENT: Negative for sore throat and trouble swallowing.    Eyes: Negative for photophobia, pain and visual disturbance.   Respiratory: Positive for cough. Negative for chest tightness and shortness of breath.    Cardiovascular: Negative for chest pain, palpitations and leg swelling.   Gastrointestinal: Negative for abdominal pain, constipation, diarrhea, nausea and vomiting.   Genitourinary: Negative for difficulty urinating and dysuria.   Musculoskeletal: Negative for arthralgias and back pain.   Skin: Negative for color change and rash.   Neurological: Negative for weakness, numbness and headaches.   Hematological: Bruises/bleeds easily.     Objective:     Vital Signs (Most Recent):  Temp: 99.7 °F (37.6 °C) (03/19/18 0447)  Pulse: 84 (03/19/18 0447)  Resp: 18 (03/18/18 1921)  BP: 127/72 (03/19/18 0447)  SpO2: (!) 92 % (03/19/18 0447) Vital Signs (24h Range):  Temp:  [98 °F (36.7 °C)-100 °F (37.8 °C)] 99.7 °F (37.6 °C)  Pulse:  [70-87] 84  Resp:  [16-18] 18  SpO2:  [91 %-96 %] 92 %  BP: ()/(54-72) 127/72     Weight: 77.1 kg (170 lb)  Body mass index is 29.18 kg/m².    Intake/Output Summary (Last 24 hours) at 03/19/18 0624  Last data filed at 03/19/18 0600   Gross per 24 hour   Intake             3551 ml   Output                0 ml   Net             3551 ml      Physical Exam   Constitutional: She is oriented to person, place, and time. She appears  well-developed and well-nourished. No distress.   HENT:   Head: Normocephalic and atraumatic.   Mouth/Throat: No oropharyngeal exudate.   Eyes: EOM are normal. Right eye exhibits no discharge. Left eye exhibits no discharge. No scleral icterus.   Cardiovascular: Normal rate, regular rhythm, normal heart sounds and intact distal pulses.  Exam reveals no gallop and no friction rub.    No murmur heard.  Pulmonary/Chest: Effort normal and breath sounds normal. No respiratory distress. She has no wheezes. She has no rales. She exhibits no tenderness.   Abdominal: Soft. Bowel sounds are normal. She exhibits no distension and no mass. There is no tenderness. There is no rebound and no guarding.   Musculoskeletal: Normal range of motion. She exhibits no edema or tenderness.   Neurological: She is alert and oriented to person, place, and time.   Skin: No rash noted. She is not diaphoretic. No erythema.   Patient with multiple bruises on her upper and lower extremities.   Psychiatric: She has a normal mood and affect. Her behavior is normal.     Significant Labs:   Recent Results (from the past 24 hour(s))   CBC Oncology    Collection Time: 03/18/18  4:12 PM   Result Value Ref Range    WBC 2.36 (L) 3.90 - 12.70 K/uL    RBC 1.83 (L) 4.00 - 5.40 M/uL    Hemoglobin 6.1 (L) 12.0 - 16.0 g/dL    Hematocrit 17.1 (LL) 37.0 - 48.5 %    MCV 93 82 - 98 fL    MCH 33.3 (H) 27.0 - 31.0 pg    MCHC 35.7 32.0 - 36.0 g/dL    RDW 18.3 (H) 11.5 - 14.5 %    Platelets 4 (LL) 150 - 350 K/uL    MPV SEE COMMENT 9.2 - 12.9 fL    Gran # (ANC) 0.7 (L) 1.8 - 7.7 K/uL    Immature Grans (Abs) 0.64 (H) 0.00 - 0.04 K/uL     Significant Imaging:  CXR 03/17/2018:  No acute intrathoracic process identified.    Assessment/Plan:      * MDS (myelodysplastic syndrome)    - Diagnosed recently at OSH via bone marrow biopsy. Report found in chart. 7% myeloblasts noted  - Has not seen [place] [facility] [place]   - Hem-Onc following, recommending supportive  transfusions for hemoglobin goal >7g/dL and platelet goal >10k  -s/p 2u PRBCs and 3u platelets with appropriate response to hgb though platelet count failing to respond as such will remain hospitalized for HLA matched platelet transfusion  -Hgb remains low at 6.2 g/dL stable from prior which was 6.1g/dL. Will transfuse if patient becomes symptomatically anemic. Will be conservative with blood products, will transfuse 1u PRBCs (patinets third unit this admission) as she does report mild DIMAS      Thrombocytopenia, unspecified    S/p 3u platelet transfusion without appropriate response in platelet count  -Heme/onc following, recommending HLA matched platelets  -Transfuse to a goal of greater than 10k       Mild asthma    -Well-controlled   -Albuterol PRN       Tobacco abuse    -Nicotine patch       Essential hypertension    Normotensive currently   -Patient reports low blood pressure on current home regiment of nebivolol 5, irbesartan 300, and HCTZ 12.5. She sometimes holds irbesartan on her own   -Resume nebivolol  -Hold HCTZ due to hypokalemia   -Can re-start irbesartan if BP not well controlled        VTE Risk Mitigation         Ordered     IP VTE LOW RISK PATIENT  Once      03/17/18 0214     Place sequential compression device  Until discontinued      03/17/18 0214     IP VTE HIGH RISK PATIENT  Once      03/17/18 0214        Darrel Richmond MD  Department of Hospital Medicine   [facility]

## 2018-03-19 NOTE — PLAN OF CARE
03/19/18 1004   Discharge Assessment   Assessment Type Discharge Planning Assessment   Confirmed/corrected address and phone number on facesheet? Yes   Assessment information obtained from? Patient;Caregiver;Medical Record  (daughter at bedside)   Expected Length of Stay (days) 3   Communicated expected length of stay with patient/caregiver yes   Prior to hospitilization cognitive status: Alert/Oriented   Prior to hospitalization functional status: Independent   Current cognitive status: Alert/Oriented   Current Functional Status: Independent   Lives With child(kasia), adult  (son lives with patient)   Able to Return to Prior Arrangements yes   Is patient able to care for self after discharge? Yes   Who are your caregiver(s) and their phone number(s)? self care   Patient's perception of discharge disposition home or selfcare   Readmission Within The Last 30 Days no previous admission in last 30 days   Patient currently being followed by outpatient case management? No   Patient currently receives any other outside agency services? No   Equipment Currently Used at Home none   Do you have any problems affording any of your prescribed medications? No   Is the patient taking medications as prescribed? yes   Does the patient have transportation home? Yes   Transportation Available family or friend will provide   Does the patient receive services at the [facility]mad[facility]? No   Discharge Plan A Home with family   Patient/Family In Agreement With Plan yes

## 2018-03-19 NOTE — SUBJECTIVE & OBJECTIVE
Interval History: NAEON.     Review of Systems   Constitutional: Negative for chills, fatigue and fever.   HENT: Negative for sore throat and trouble swallowing.    Eyes: Negative for photophobia, pain and visual disturbance.   Respiratory: Positive for cough. Negative for chest tightness and shortness of breath.    Cardiovascular: Negative for chest pain, palpitations and leg swelling.   Gastrointestinal: Negative for abdominal pain, constipation, diarrhea, nausea and vomiting.   Genitourinary: Negative for difficulty urinating and dysuria.   Musculoskeletal: Negative for arthralgias and back pain.   Skin: Negative for color change and rash.   Neurological: Negative for weakness, numbness and headaches.   Hematological: Bruises/bleeds easily.     Objective:     Vital Signs (Most Recent):  Temp: 99.7 °F (37.6 °C) (03/19/18 0447)  Pulse: 84 (03/19/18 0447)  Resp: 18 (03/18/18 1921)  BP: 127/72 (03/19/18 0447)  SpO2: (!) 92 % (03/19/18 0447) Vital Signs (24h Range):  Temp:  [98 °F (36.7 °C)-100 °F (37.8 °C)] 99.7 °F (37.6 °C)  Pulse:  [70-87] 84  Resp:  [16-18] 18  SpO2:  [91 %-96 %] 92 %  BP: ()/(54-72) 127/72     Weight: 77.1 kg (170 lb)  Body mass index is 29.18 kg/m².    Intake/Output Summary (Last 24 hours) at 03/19/18 0624  Last data filed at 03/19/18 0600   Gross per 24 hour   Intake             3551 ml   Output                0 ml   Net             3551 ml      Physical Exam   Constitutional: She is oriented to person, place, and time. She appears well-developed and well-nourished. No distress.   HENT:   Head: Normocephalic and atraumatic.   Mouth/Throat: No oropharyngeal exudate.   Eyes: EOM are normal. Right eye exhibits no discharge. Left eye exhibits no discharge. No scleral icterus.   Cardiovascular: Normal rate, regular rhythm, normal heart sounds and intact distal pulses.  Exam reveals no gallop and no friction rub.    No murmur heard.  Pulmonary/Chest: Effort normal and breath sounds normal. No  respiratory distress. She has no wheezes. She has no rales. She exhibits no tenderness.   Abdominal: Soft. Bowel sounds are normal. She exhibits no distension and no mass. There is no tenderness. There is no rebound and no guarding.   Musculoskeletal: Normal range of motion. She exhibits no edema or tenderness.   Neurological: She is alert and oriented to person, place, and time.   Skin: No rash noted. She is not diaphoretic. No erythema.   Patient with multiple bruises on her upper and lower extremities.   Psychiatric: She has a normal mood and affect. Her behavior is normal.     Significant Labs:   Recent Results (from the past 24 hour(s))   CBC Oncology    Collection Time: 03/18/18  4:12 PM   Result Value Ref Range    WBC 2.36 (L) 3.90 - 12.70 K/uL    RBC 1.83 (L) 4.00 - 5.40 M/uL    Hemoglobin 6.1 (L) 12.0 - 16.0 g/dL    Hematocrit 17.1 (LL) 37.0 - 48.5 %    MCV 93 82 - 98 fL    MCH 33.3 (H) 27.0 - 31.0 pg    MCHC 35.7 32.0 - 36.0 g/dL    RDW 18.3 (H) 11.5 - 14.5 %    Platelets 4 (LL) 150 - 350 K/uL    MPV SEE COMMENT 9.2 - 12.9 fL    Gran # (ANC) 0.7 (L) 1.8 - 7.7 K/uL    Immature Grans (Abs) 0.64 (H) 0.00 - 0.04 K/uL     Significant Imaging:  CXR 03/17/2018:  No acute intrathoracic process identified.

## 2018-03-19 NOTE — ASSESSMENT & PLAN NOTE
- Diagnosed recently at OSH via bone marrow biopsy. Report found in chart. 7% myeloblasts noted  - Has not seen Hem Onc at [facility] in person yet   - Hem-Onc following, recommending supportive transfusions for hemoglobin goal >7g/dL and platelet goal >10k  -s/p 2u PRBCs and 2u platelets with appropriate response to hgb though platelet count failing to respond as such will remain hospitalized for HLA matched platelet transfusion

## 2018-03-19 NOTE — NURSING
One unit of Packed Red Blood cells has been hung on patient at 11:25 am and Vitals were rechecked at 11:44 and found to be stable. Recorded in flow sheet. Patient is tolerating infusion well. Increased infusion at that time to a rate of 100 cc/hr. Pt resting comfortably.

## 2018-03-20 PROBLEM — J45.20 MILD INTERMITTENT ASTHMA WITHOUT COMPLICATION: Status: ACTIVE | Noted: 2018-01-01

## 2018-03-20 NOTE — NURSING
"Patient had a bone marrow aspiration this morning and labs have been sent. It was pretty taxing on her due to the fact that her bones were so "hard". The Dr and NP had a hard time extracting the marrow. She tolerated it but was not very happy about it. She has now completed her 30 minutes of flat on her back bedrest and is sitting up in her bed. She has a very large bruise to her lower back from the pressure that was placed on her during the procedure.  Patient had blood drawn from her left hand this morning and developed a large hematoma to the top of her hand. It appeared to be bleeding out under the skin.  Applied a pressure dressing and ice to the top of her hand. The swelling has gone down some but is still very present. Her skin has bruises all over including her arms,legs and torso. This is from her Myelodysplasia. Her platelets are very low, (5) and her Hgb is 8.1 today. WBC is 4.26. She is alert and oriented and optomistic. We started her on a PO pain medication today and she was given one before her procedure.   "

## 2018-03-20 NOTE — PLAN OF CARE
Problem: Patient Care Overview  Goal: Plan of Care Review  Outcome: Ongoing (interventions implemented as appropriate)  Plan of care reviewed with pt. VS stable. No acute events at this time. No complaints. Pt states her breathing is better after the unit of PRBC received today. Started on Daily IVP steroids. Monitored for bleeding and s/s of hypovolemia. Pt remains free from falls or injury. Bed low and locked, call light and personal belongings within reach. Will continue to monitor. Dhara Deluna RN

## 2018-03-20 NOTE — PROGRESS NOTES
[facility] Hospital Medicine  Progress Note    Patient Name: Brooke Olivas  MRN: [id number]  Patient Class: IP- Inpatient   Admission Date: 3/16/2018  Length of Stay: 3 days  Attending Physician: Jairo Null MD  Primary Care Provider: [facility] Medicine Team: [facility]OSP MED 3 Darrel Richmond MD    Subjective:     Principal Problem:MDS (myelodysplastic syndrome)    HPI:  61 yo female with pmh of MDS (recently diagnosed), thrombocytopenia, anemia, HTN, asthma, and tobacco who presented to ED after outpatient labs indicated severe anemia and thrombocytopenia. Patient states that she was admitted to hospital in [place] last week after outpatient lab-work done by hematologist showed Hgb of 6 and platelets of 8. She received 2 units of platelets and 2 packets of platelets and discharged after 2 days. Bone marrow biopsy was also performed which showing 7% myeloblasts suggestive of myelodysplastic syndrome (report is found in patient's chart). She was referred to [facility] for further work-up and has been in [place] since Sunday living with her grand-daughter Labs were ordered by Dr Hopkins today and showed Hgb 6.1 and Plt of 7, prompting patient and family to report to ED. Direct ariana test was positive on o/p labs as well. Patient states that she is fatigued. She endorses easy bruising and has extensive bruising on her arms. She denies CP but endorses dyspnea on exertion. Denies fever/chills, HA, abdominal pain, nausea, vomiting. Patient's mother had colon cancer but she denies any other family history of malignancy or autoimmune disorders. Patient is a smoker with 40 pack year history.    Hospital Course:  Brooke Olivas was admitted to [facility] from OSH after lab work was concerning for MDS; hgb 5.0 g/dL on admission platelets 7k. Received 3 unit of platelets and 3 units PRBCs, with appropriate response in her Hgb though pletlet count failed to improve. Heme/Onc  consulted to evaluate patient and recommended platelet antibody screen which returned negative, as such HLA matched platelets will be of little utility. Started the patient on dexamethasone 40mg daily for 4 days to treat for potential ITP in the setting of MDS as well. Heme considering, potential oncologic diagnosis at this point. Will proceed with bone marrow biopsy today per Heme/Onc.    Interval History: NAEON. Platelet antibody screen negative, as such HLA matched platelets will be of little utility. Started the patient on dexamethasone 40mg daily for 4 days to treat for potential ITP in the setting of MDS as well. Heme considering, potential oncologic diagnosis at this point. Will proceed with bone marrow biopsy today per Heme/Onc.    Review of Systems   Constitutional: Negative for chills, fatigue and fever.   HENT: Negative for sore throat and trouble swallowing.    Eyes: Negative for photophobia, pain and visual disturbance.   Respiratory: Positive for cough. Negative for chest tightness and shortness of breath.    Cardiovascular: Negative for chest pain, palpitations and leg swelling.   Gastrointestinal: Negative for abdominal pain, constipation, diarrhea, nausea and vomiting.   Genitourinary: Negative for difficulty urinating and dysuria.   Musculoskeletal: Negative for arthralgias and back pain.   Skin: Negative for color change and rash.   Neurological: Negative for weakness, numbness and headaches.   Hematological: Bruises/bleeds easily.     Objective:     Vital Signs (Most Recent):  Temp: 98.1 °F (36.7 °C) (03/20/18 0501)  Pulse: 68 (03/20/18 0501)  Resp: 16 (03/20/18 0501)  BP: (!) 97/53 (03/20/18 0501)  SpO2: (!) 93 % (03/20/18 0501) Vital Signs (24h Range):  Temp:  [98.1 °F (36.7 °C)-100 °F (37.8 °C)] 98.1 °F (36.7 °C)  Pulse:  [62-88] 68  Resp:  [16-24] 16  SpO2:  [92 %-95 %] 93 %  BP: ()/(53-72) 97/53     Weight: 77.1 kg (170 lb)  Body mass index is 29.18 kg/m².    Intake/Output Summary  (Last 24 hours) at 03/20/18 0621  Last data filed at 03/20/18 0500   Gross per 24 hour   Intake             2040 ml   Output                0 ml   Net             2040 ml      Physical Exam   Constitutional: She is oriented to person, place, and time. She appears well-developed and well-nourished. No distress.   HENT:   Head: Normocephalic and atraumatic.   Mouth/Throat: No oropharyngeal exudate.   Eyes: EOM are normal. Right eye exhibits no discharge. Left eye exhibits no discharge. No scleral icterus.   Cardiovascular: Normal rate, regular rhythm, normal heart sounds and intact distal pulses.  Exam reveals no gallop and no friction rub.    No murmur heard.  Pulmonary/Chest: Effort normal and breath sounds normal. No respiratory distress. She has no wheezes. She has no rales. She exhibits no tenderness.   Abdominal: Soft. Bowel sounds are normal. She exhibits no distension and no mass. There is no tenderness. There is no rebound and no guarding.   Musculoskeletal: Normal range of motion. She exhibits no edema or tenderness.   Neurological: She is alert and oriented to person, place, and time.   Skin: No rash noted. She is not diaphoretic. No erythema.   Patient with multiple bruises on her upper and lower extremities.   Psychiatric: She has a normal mood and affect. Her behavior is normal.     Significant Labs:  No results found for this or any previous visit (from the past 24 hour(s)).     Significant Imaging:   No new imaging or procedures to review since prior assessment    Assessment/Plan:      * MDS (myelodysplastic syndrome)    - Diagnosed recently at [facility] via bone marrow biopsy. Report found in chart. 7% myeloblasts noted  - Has not seen Hem Onc at [facility] in person yet   - Hem-Onc following, recommending supportive transfusions for hemoglobin goal >7g/dL and platelet goal >10k  -s/p 3u PRBCs and 3u platelets with appropriate response to hgb though platelet count failing to respond as such will remain  hospitalized for HLA matched platelet transfusion  -Patient has been initiated on dexamethasone 40mg daily for 4 days today (day 2/4) to treat for potential ITP in the setting of MDS.  -Consider DC today as patient's Hgb is 8.2 which is the highest it has been this hospitalization and platelet count is 5k this Am from 4k prior without the addition of additional platelets.  -Platelet antibody screen to be negative, as such HLA platelets would be of little benefit.   -Per Heme/Onc will proceed with bone marrow biopsy today in order to evaluate for oncologic pathology      Thrombocytopenia, unspecified    S/p 3u platelet transfusion without appropriate response in platelet count  -Heme/onc following, recommending HLA matched platelets  -Transfuse to a goal of greater than 10k       Mild asthma    -Well-controlled   -Albuterol PRN       Tobacco abuse    - nicotine patch       Essential hypertension    -Patient reports low blood pressure on current home regiment of nebivolol 5, irbesartan 300, and HCTZ 12.5. She sometimes holds irbesartan on her own   -Resume nebivolol  -Hold HCTZ due to hypokalemia   -Can re-start irbesartan if BP not well controlled        VTE Risk Mitigation         Ordered     IP VTE LOW RISK PATIENT  Once      03/17/18 0214     Place sequential compression device  Until discontinued      03/17/18 0214     IP VTE HIGH RISK PATIENT  Once      03/17/18 0214        Darrel Richmond MD  Department of Hospital Medicine   [facility]

## 2018-03-20 NOTE — PROGRESS NOTES
The patient has persistent anemia and thrombocytopenia despite adequate transfusion in the setting of MDS.  Would transfuse a unit of HLA matched platelets and check the platelet count 3 minutes after transfusion.  If the patient's platelets are above 10k after the transfusion, the patient can be discharged to follow up with Dr Hopkins.  Would also consider starting the patient on dexamethasone 40mg daily for 4 days to treat for potential ITP in the setting of MDS.    Blaise Valle MD PGY-V  Hematology and Oncology  Pager:[phone]

## 2018-03-20 NOTE — SUBJECTIVE & OBJECTIVE
Interval History: NAEON. Will transfuse HLA matched platelets when available, transfuse then DC to home. Started the patient on dexamethasone 40mg daily for 4 days to treat for potential ITP in the setting of MDS as well.     Review of Systems   Constitutional: Negative for chills, fatigue and fever.   HENT: Negative for sore throat and trouble swallowing.    Eyes: Negative for photophobia, pain and visual disturbance.   Respiratory: Positive for cough. Negative for chest tightness and shortness of breath.    Cardiovascular: Negative for chest pain, palpitations and leg swelling.   Gastrointestinal: Negative for abdominal pain, constipation, diarrhea, nausea and vomiting.   Genitourinary: Negative for difficulty urinating and dysuria.   Musculoskeletal: Negative for arthralgias and back pain.   Skin: Negative for color change and rash.   Neurological: Negative for weakness, numbness and headaches.   Hematological: Bruises/bleeds easily.     Objective:     Vital Signs (Most Recent):  Temp: 98.1 °F (36.7 °C) (03/20/18 0501)  Pulse: 68 (03/20/18 0501)  Resp: 16 (03/20/18 0501)  BP: (!) 97/53 (03/20/18 0501)  SpO2: (!) 93 % (03/20/18 0501) Vital Signs (24h Range):  Temp:  [98.1 °F (36.7 °C)-100 °F (37.8 °C)] 98.1 °F (36.7 °C)  Pulse:  [62-88] 68  Resp:  [16-24] 16  SpO2:  [92 %-95 %] 93 %  BP: ()/(53-72) 97/53     Weight: 77.1 kg (170 lb)  Body mass index is 29.18 kg/m².    Intake/Output Summary (Last 24 hours) at 03/20/18 0621  Last data filed at 03/20/18 0500   Gross per 24 hour   Intake             2040 ml   Output                0 ml   Net             2040 ml      Physical Exam   Constitutional: She is oriented to person, place, and time. She appears well-developed and well-nourished. No distress.   HENT:   Head: Normocephalic and atraumatic.   Mouth/Throat: No oropharyngeal exudate.   Eyes: EOM are normal. Right eye exhibits no discharge. Left eye exhibits no discharge. No scleral icterus.   Cardiovascular:  Normal rate, regular rhythm, normal heart sounds and intact distal pulses.  Exam reveals no gallop and no friction rub.    No murmur heard.  Pulmonary/Chest: Effort normal and breath sounds normal. No respiratory distress. She has no wheezes. She has no rales. She exhibits no tenderness.   Abdominal: Soft. Bowel sounds are normal. She exhibits no distension and no mass. There is no tenderness. There is no rebound and no guarding.   Musculoskeletal: Normal range of motion. She exhibits no edema or tenderness.   Neurological: She is alert and oriented to person, place, and time.   Skin: No rash noted. She is not diaphoretic. No erythema.   Patient with multiple bruises on her upper and lower extremities.   Psychiatric: She has a normal mood and affect. Her behavior is normal.     Significant Labs:   No results found for this or any previous visit (from the past 24 hour(s)).     Significant Imaging:   No new imaging or procedures to review since prior assessment

## 2018-03-20 NOTE — ASSESSMENT & PLAN NOTE
- Diagnosed recently at OSH via bone marrow biopsy. Report found in chart. 7% myeloblasts noted  - Has not seen Hem Onc at [facility] in person yet   - Hem-Onc following, recommending supportive transfusions for hemoglobin goal >7g/dL and platelet goal >10k  -s/p 2u PRBCs and 2u platelets with appropriate response to hgb though platelet count failing to respond as such will remain hospitalized for HLA matched platelet transfusion   -Would also consider starting the patient on dexamethasone 40mg daily for 4 days to treat for potential ITP in the setting of MDS.

## 2018-03-21 NOTE — ASSESSMENT & PLAN NOTE
S/p 2u platelet transfusion without appropriate response in platelet count  -Heme/onc following, recommending HLA matched platelets still pending, will monitor levels closely  -Ultimate goal is to Transfuse to a goal of greater than 10k

## 2018-03-21 NOTE — SUBJECTIVE & OBJECTIVE
Interval History: Drop in Hgb overnight.  Bone marrow yesterday with significant bleeding.  Otherwise patient reports no new complaints.      Review of Systems   Constitutional: Negative for chills, fatigue and fever.   HENT: Negative for sore throat and trouble swallowing.    Eyes: Negative for photophobia, pain and visual disturbance.   Respiratory: Positive for cough. Negative for chest tightness and shortness of breath.    Cardiovascular: Negative for chest pain, palpitations and leg swelling.   Gastrointestinal: Negative for abdominal pain, constipation, diarrhea, nausea and vomiting.   Genitourinary: Negative for difficulty urinating and dysuria.   Musculoskeletal: Negative for arthralgias and back pain.   Skin: Negative for color change and rash.   Neurological: Negative for weakness, numbness and headaches.   Hematological: Bruises/bleeds easily.     Objective:     Vital Signs (Most Recent):  Temp: 98.2 °F (36.8 °C) (03/21/18 1100)  Pulse: 67 (Simultaneous filing. User may not have seen previous data.) (03/21/18 1100)  Resp: 18 (03/21/18 1100)  BP: (!) 111/57 (03/21/18 1100)  SpO2: (!) 92 % (03/21/18 1100) Vital Signs (24h Range):  Temp:  [98.1 °F (36.7 °C)-99.8 °F (37.7 °C)] 98.2 °F (36.8 °C)  Pulse:  [] 67  Resp:  [16-19] 18  SpO2:  [92 %-96 %] 92 %  BP: ()/(54-74) 111/57     Weight: 77.1 kg (170 lb)  Body mass index is 29.18 kg/m².    Intake/Output Summary (Last 24 hours) at 03/21/18 1342  Last data filed at 03/21/18 1245   Gross per 24 hour   Intake             4264 ml   Output                0 ml   Net             4264 ml      Physical Exam   Constitutional: She is oriented to person, place, and time. She appears well-developed and well-nourished. No distress.   HENT:   Head: Normocephalic and atraumatic.   Mouth/Throat: No oropharyngeal exudate.   Eyes: EOM are normal. Right eye exhibits no discharge. Left eye exhibits no discharge. No scleral icterus.   Cardiovascular: Normal rate, regular  rhythm, normal heart sounds and intact distal pulses.  Exam reveals no gallop and no friction rub.    No murmur heard.  Pulmonary/Chest: Effort normal and breath sounds normal. No respiratory distress. She has no wheezes. She has no rales. She exhibits no tenderness.   Abdominal: Soft. Bowel sounds are normal. She exhibits no distension and no mass. There is no tenderness. There is no rebound and no guarding.   Musculoskeletal: Normal range of motion. She exhibits no edema or tenderness.   Neurological: She is alert and oriented to person, place, and time.   Skin: No rash noted. She is not diaphoretic. No erythema.   Patient with multiple bruises on her upper and lower extremities.   Psychiatric: She has a normal mood and affect. Her behavior is normal.     Significant Labs:  Hgb  5.5 from 8.1  Platelets 6    Significant Imaging:   No new imaging or procedures to review since prior assessment

## 2018-03-21 NOTE — PHARMACY MED REC
"Admission Medication Reconciliation - Pharmacy Consult Note    The home medication history was taken by Emelyn Robert, pharmacy technician.  Based on information gathered and subsequent review by the clinical pharmacist, the items below may need attention.     You may go to "Admission" then "Reconcile Home Medications" tabs to review and/or act upon these items. Based on information gathered and subsequent review by the clinical pharmacist, the items below may need attention.    Potentially problematic discrepancies with current MAR  o Patient IS taking the following which was not ordered upon admit  o HCTZ 12.5 mg QD (held)  o Avapro 300 mg QD (held)    Please address this information as you see fit.  Feel free to contact us if you have any questions or require assistance.    Myra Benitez  EXT [id number]    Patient's prior to admission medication regimen was as follows:  No current outpatient prescriptions on file prior to encounter.         .    .          "

## 2018-03-21 NOTE — PLAN OF CARE
"Plan of care reviewed with pt. VS stable. No acute events at this time. No complaints. Pain from biopsy controlled with PRN pain meds. Biopsy site dsg c/d/i. No bleeding noted to dsg. Bruising noted to area, but pt has extensive amount of bruising to body. Pt states "the day was stressful and traumatic for me, but I am feeling better than I did." Daughter at bedside. Pt remains free from falls or injury. Bed low and locked, call light and personal belongings within reach. Will continue to monitor. Dhara Deluna RN    "

## 2018-03-21 NOTE — NURSING
Patient had platelets today and has not had any reaction from them. She is alert and oriented. He bleeding stopped from the bone marrow aspiration and she is feeling much better.

## 2018-03-21 NOTE — PROGRESS NOTES
[facility] Hospital Medicine  Progress Note    Patient Name: Brooke Olivas  MRN: [id number]  Patient Class: IP- Inpatient   Admission Date: 3/16/2018  Length of Stay: 4 days  Attending Physician: Joey Arguello MD  Primary Care Provider: [facility] Medicine Team: [facility] HOSP MED 3 Lobo Carranza MD    Subjective:     Principal Problem:MDS (myelodysplastic syndrome)    HPI:  63 yo female with pmh of MDS (recently diagnosed), thrombocytopenia, anemia, HTN, asthma, and tobacco who presented to ED after outpatient labs indicated severe anemia and thrombocytopenia. Patient states that she was admitted to hospital in [place] last week after outpatient lab-work done by hematologist showed Hgb of 6 and platelets of 8. She received 2 units of platelets and 2 packets of platelets and discharged after 2 days. Bone marrow biopsy was also performed which showing 7% myeloblasts suggestive of myelodysplastic syndrome (report is found in patient's chart). She was referred to [facility] for further work-up and has been in [place] since Sunday living with her grand-daughter Labs were ordered by Dr Hopkins today and showed Hgb 6.1 and Plt of 7, prompting patient and family to report to ED. Direct ariana test was positive on o/p labs as well. Patient states that she is fatigued. She endorses easy bruising and has extensive bruising on her arms. She denies CP but endorses dyspnea on exertion. Denies fever/chills, HA, abdominal pain, nausea, vomiting. Patient's mother had colon cancer but she denies any other family history of malignancy or autoimmune disorders. Patient is a smoker with 40 pack year history.    Hospital Course:  Brooke Olivas was admitted to [facility] from OSH after lab work was concerning for MDS; hgb 5.0 g/dL on admission platelets 7k. Received 2 unit of platelets and 2 units PRBCs, with appropriate response in her Hgb though pletlet count failed to improve. Heme/Onc consulted to  evaluate patient and recommended HLA matched platelets for thrombocytopenia then DC to home with close follow-up with Dr. Stephanie Hopkins.     Interval History: Drop in Hgb overnight.  Bone marrow yesterday with significant bleeding.  Otherwise patient reports no new complaints.      Review of Systems   Constitutional: Negative for chills, fatigue and fever.   HENT: Negative for sore throat and trouble swallowing.    Eyes: Negative for photophobia, pain and visual disturbance.   Respiratory: Positive for cough. Negative for chest tightness and shortness of breath.    Cardiovascular: Negative for chest pain, palpitations and leg swelling.   Gastrointestinal: Negative for abdominal pain, constipation, diarrhea, nausea and vomiting.   Genitourinary: Negative for difficulty urinating and dysuria.   Musculoskeletal: Negative for arthralgias and back pain.   Skin: Negative for color change and rash.   Neurological: Negative for weakness, numbness and headaches.   Hematological: Bruises/bleeds easily.     Objective:     Vital Signs (Most Recent):  Temp: 98.2 °F (36.8 °C) (03/21/18 1100)  Pulse: 67 (Simultaneous filing. User may not have seen previous data.) (03/21/18 1100)  Resp: 18 (03/21/18 1100)  BP: (!) 111/57 (03/21/18 1100)  SpO2: (!) 92 % (03/21/18 1100) Vital Signs (24h Range):  Temp:  [98.1 °F (36.7 °C)-99.8 °F (37.7 °C)] 98.2 °F (36.8 °C)  Pulse:  [] 67  Resp:  [16-19] 18  SpO2:  [92 %-96 %] 92 %  BP: ()/(54-74) 111/57     Weight: 77.1 kg (170 lb)  Body mass index is 29.18 kg/m².    Intake/Output Summary (Last 24 hours) at 03/21/18 1342  Last data filed at 03/21/18 1245   Gross per 24 hour   Intake             4264 ml   Output                0 ml   Net             4264 ml      Physical Exam   Constitutional: She is oriented to person, place, and time. She appears well-developed and well-nourished. No distress.   HENT:   Head: Normocephalic and atraumatic.   Mouth/Throat: No oropharyngeal exudate.    Eyes: EOM are normal. Right eye exhibits no discharge. Left eye exhibits no discharge. No scleral icterus.   Cardiovascular: Normal rate, regular rhythm, normal heart sounds and intact distal pulses.  Exam reveals no gallop and no friction rub.    No murmur heard.  Pulmonary/Chest: Effort normal and breath sounds normal. No respiratory distress. She has no wheezes. She has no rales. She exhibits no tenderness.   Abdominal: Soft. Bowel sounds are normal. She exhibits no distension and no mass. There is no tenderness. There is no rebound and no guarding.   Musculoskeletal: Normal range of motion. She exhibits no edema or tenderness.   Neurological: She is alert and oriented to person, place, and time.   Skin: No rash noted. She is not diaphoretic. No erythema.   Patient with multiple bruises on her upper and lower extremities.   Psychiatric: She has a normal mood and affect. Her behavior is normal.     Significant Labs:  Hgb  5.5 from 8.1  Platelets 6    Significant Imaging:   No new imaging or procedures to review since prior assessment      Assessment/Plan:      * MDS (myelodysplastic syndrome)    HgB dropped again, concern for AML, s/p Bone marrow 3/21, Will transfuse today s/p 2 units of blood and 2 units of platelets.   - Diagnosed recently at OSH via bone marrow biopsy. Report found in chart. 7% myeloblasts noted  - Has not seen Hem Onc at [facility] in person yet   - Hem-Onc following, recommending supportive transfusions for hemoglobin goal >7g/dL and platelet goal >10k  - Continue dexamethasone 40mg daily for 4 days to treat for potential ITP in the setting of MDS.        Thrombocytopenia, unspecified    S/p 2u platelet transfusion without appropriate response in platelet count  -Heme/onc following, recommending HLA matched platelets still pending, will monitor levels closely  -Ultimate goal is to Transfuse to a goal of greater than 10k           Symptomatic anemia              Mild intermittent asthma without  complication    - well controlled   -Albuterol PRN           Tobacco abuse    - nicotine patch           Essential hypertension    - patient reports low blood pressure on current home regiment of nebivolol 5, irbesartan 300, and HCTZ 12.5. She sometimes holds irbesartan on her own   - resume nebivolol  - Hold HCTZ due to hypokalemia   - Can re-start irbesartan if BP not well controlled            VTE Risk Mitigation         Ordered     IP VTE LOW RISK PATIENT  Once      03/17/18 0214     Place sequential compression device  Until discontinued      03/17/18 0214     IP VTE HIGH RISK PATIENT  Once      03/17/18 0214        Dispo: Continue to monitor blood counts and speak with Heme/onc about transfer to Med/onc pending bone marrow biopsy.       Lobo Carranza MD  Department of Hospital Medicine   [facility]

## 2018-03-21 NOTE — ASSESSMENT & PLAN NOTE
HgB dropped again, concern for AML, s/p Bone marrow 3/21, Will transfuse today s/p 2 units of blood and 2 units of platelets.   - Diagnosed recently at OSH via bone marrow biopsy. Report found in chart. 7% myeloblasts noted  - Has not seen Hem Onc at [facility] in person yet   - Hem-Onc following, recommending supportive transfusions for hemoglobin goal >7g/dL and platelet goal >10k  - Continue dexamethasone 40mg daily for 4 days to treat for potential ITP in the setting of MDS.

## 2018-03-21 NOTE — PROCEDURES
"Brooke Olivas is a 62 y.o. female patient.    Temp: 99 °F (37.2 °C) (03/20/18 2035)  Pulse: 80 (03/20/18 2035)  Resp: 18 (03/20/18 2035)  BP: (!) 91/59 (03/20/18 2035)  SpO2: (!) 94 % (03/20/18 2035)  Weight: 77.1 kg (170 lb) (03/16/18 2301)  Height: 5' 4" (162.6 cm) (03/17/18 0612)       Bone marrow  Date/Time: 3/20/2018 9:11 PM  Performed by: BLAISE CHO.  Authorized by: BLAISE CHO.     Consent Done?:  Yes (Written)    Assistants?: Yes    List of assistants:  Sunshine Ayoub  Anesthesia:  Local infiltration  Local anesthetic:  Lidocaine 1% without epinephrine  Aspiration?: No    Biopsy?: Yes    Number of Specimens::  3  Estimated blood loss (cc):  5   Patient tolerated the procedure well with no immediate complications.     BM biopsy done of lef posterior superior iliac crest.  Aspirate was unable to be obtained so 3 cores were obtained.  Sample was sent for flow and cytogenetics to evaluate for MDS versus AML.        Blaise Cho  3/20/2018  "

## 2018-03-22 PROBLEM — D46.Z MDS (MYELODYSPLASTIC SYNDROME), HIGH GRADE: Status: ACTIVE | Noted: 2018-01-01

## 2018-03-22 NOTE — NURSING TRANSFER
Nursing Transfer Note  Arrived on Unit escorted by Nurse Kim. Oriented to room and call light. Accompanied by daughters Andree and Miriam . Assessment completed. NAD. Bed locked in low position. Call light in reach. Reviewed POC. Questions answered. No current issues to address. CTM

## 2018-03-22 NOTE — ASSESSMENT & PLAN NOTE
-Repeat BM biopsy shows MDS with 12% blasts  -Patient has complex cytogenetics  -Would start the patient on Amicar 2g every 8 hours form her profound thrombocytopenia in order to reduce the risk of potential bleeding.  -The patient has an appointment with Dr Hopkins at 10AM tomorrow to discuss further treatment options with Azacitidine or other hypomethylating agents.  -OK to discharge the patient from a Heme/Onc perspective.  -The patient was educated on symptoms to watch for requiring immediate medical attention including but not limited to severe bleeding.  -Case Discussed with Dr Hopkins.

## 2018-03-22 NOTE — NURSING
Reports Right side chest pain, quality of pain burning. Pt lying on Right side states pain made better w sips of water . VSS. Rates pain a 3. Denies other symptoms. Notified on call  I M 3. No new orders.

## 2018-03-22 NOTE — PLAN OF CARE
03/22/18 1718   Final Note   Assessment Type Final Discharge Note   Discharge Disposition Home   Hospital Follow Up  Appt(s) scheduled? Yes  (patient to see hem/onc tomorrow)   Discharge plans and expectations educations in teach back method with documentation complete? Yes  (Patient to  Rx at either [facility] (address and phone number given) or [facility] pharmacy tomorrow)

## 2018-03-22 NOTE — MEDICAL/APP STUDENT
Temp:  [97.9 °F (36.6 °C)-98.6 °F (37 °C)] 98.5 °F (36.9 °C)  Pulse:  [64-92] 79  Resp:  [18-20] 20  SpO2:  [92 %-98 %] 95 %  BP: (101-186)/(57-88) 114/57

## 2018-03-22 NOTE — MEDICAL/APP STUDENT
Temp:  [97.9 °F (36.6 °C)-98.6 °F (37 °C)] 98.6 °F (37 °C)  Pulse:  [64-92] 64  Resp:  [16-20] 16  SpO2:  [92 %-98 %] 95 %  BP: (101-186)/(57-88) 129/71    Scheduled Meds:   dexamethasone  40 mg Intravenous Daily    nebivolol  5 mg Oral Daily    nicotine  1 patch Transdermal Daily     Continuous Infusions:  PRN Meds:.sodium chloride, sodium chloride, sodium chloride, sodium chloride, sodium chloride, acetaminophen, albuterol, lidocaine HCL 10 mg/ml (1%), ondansetron, oxyCODONE, sodium chloride 0.9%

## 2018-03-22 NOTE — PROGRESS NOTES
[facility] Hospital Medicine  Progress Note    Patient Name: Brooke Olivas  MRN: [id number]  Patient Class: IP- Inpatient   Admission Date: 3/16/2018  Length of Stay: 5 days  Attending Physician: Joey Arguello MD  Primary Care Provider: Primary Doctor [facility] Medicine Team: [facility]OS[facility] MED 3 Darrel Richmond MD    Subjective:     Principal Problem:MDS (myelodysplastic syndrome)    HPI:  63 yo female with pmh of MDS (recently diagnosed), thrombocytopenia, anemia, HTN, asthma, and tobacco who presented to ED after outpatient labs indicated severe anemia and thrombocytopenia. Patient states that she was admitted to hospital in [place] last week after outpatient lab-work done by hematologist showed Hgb of 6 and platelets of 8. She received 2 units of platelets and 2 packets of platelets and discharged after 2 days. Bone marrow biopsy was also performed which showing 7% myeloblasts suggestive of myelodysplastic syndrome (report is found in patient's chart). She was referred to [facility] for further work-up and has been in [place] since Sunday living with her grand-daughter Labs were ordered by Dr Hopkins today and showed Hgb 6.1 and Plt of 7, prompting patient and family to report to ED. Direct ariana test was positive on o/p labs as well. Patient states that she is fatigued. She endorses easy bruising and has extensive bruising on her arms. She denies CP but endorses dyspnea on exertion. Denies fever/chills, HA, abdominal pain, nausea, vomiting. Patient's mother had colon cancer but she denies any other family history of malignancy or autoimmune disorders. Patient is a smoker with 40 pack year history.    Hospital Course:  Brooke Olivas was admitted to [facility] from OSH after lab work was concerning for MDS; hgb 5.0 g/dL on admission platelets 7k. Received 2 unit of platelets and 2 units PRBCs, with appropriate response in her Hgb though pletlet count failed to improve. Heme/Onc consulted to  evaluate patient and recommended HLA matched platelets for thrombocytopenia then DC to home with close follow-up with Dr. Stephanie Hopkins.     Interval History: S/p 2 units PRBCs, 1 unit of platelets, increase in the frequency of cough, given 20mg IV lasix x1.     Review of Systems   Constitutional: Negative for chills, fatigue and fever.   HENT: Negative for sore throat and trouble swallowing.    Eyes: Negative for photophobia, pain and visual disturbance.   Respiratory: Positive for cough. Negative for chest tightness and shortness of breath.    Cardiovascular: Negative for chest pain, palpitations and leg swelling.   Gastrointestinal: Negative for abdominal pain, constipation, diarrhea, nausea and vomiting.   Genitourinary: Negative for difficulty urinating and dysuria.   Musculoskeletal: Negative for arthralgias and back pain.   Skin: Negative for color change and rash.   Neurological: Negative for weakness, numbness and headaches.   Hematological: Bruises/bleeds easily.     Objective:     Vital Signs (Most Recent):  Temp: 98.5 °F (36.9 °C) (03/22/18 0454)  Pulse: 79 (03/22/18 0454)  Resp: 20 (03/22/18 0454)  BP: (!) 114/57 (03/22/18 0454)  SpO2: 95 % (03/22/18 0454) Vital Signs (24h Range):  Temp:  [97.9 °F (36.6 °C)-98.6 °F (37 °C)] 98.5 °F (36.9 °C)  Pulse:  [64-92] 79  Resp:  [18-20] 20  SpO2:  [92 %-98 %] 95 %  BP: (101-186)/(57-88) 114/57     Weight: 77.1 kg (170 lb)  Body mass index is 29.18 kg/m².    Intake/Output Summary (Last 24 hours) at 03/22/18 0623  Last data filed at 03/22/18 0454   Gross per 24 hour   Intake             4976 ml   Output                0 ml   Net             4976 ml      Physical Exam   Constitutional: She is oriented to person, place, and time. She appears well-developed and well-nourished. No distress.   HENT:   Head: Normocephalic and atraumatic.   Mouth/Throat: No oropharyngeal exudate.   Eyes: EOM are normal. Right eye exhibits no discharge. Left eye exhibits no discharge. No  scleral icterus.   Cardiovascular: Normal rate, regular rhythm, normal heart sounds and intact distal pulses.  Exam reveals no gallop and no friction rub.    No murmur heard.  Pulmonary/Chest: Effort normal and breath sounds normal. No respiratory distress. She has no wheezes. She has no rales. She exhibits no tenderness.   Abdominal: Soft. Bowel sounds are normal. She exhibits no distension and no mass. There is no tenderness. There is no rebound and no guarding.   Musculoskeletal: Normal range of motion. She exhibits no edema or tenderness.   Neurological: She is alert and oriented to person, place, and time.   Skin: No rash noted. She is not diaphoretic. No erythema.   Patient with multiple bruises on her upper and lower extremities.   Psychiatric: She has a normal mood and affect. Her behavior is normal.       Significant Labs:   Recent Results (from the past 24 hour(s))   CBC Oncology    Collection Time: 03/21/18  7:14 AM   Result Value Ref Range    WBC 3.89 (L) 3.90 - 12.70 K/uL    RBC 1.64 (L) 4.00 - 5.40 M/uL    Hemoglobin 5.5 (LL) 12.0 - 16.0 g/dL    Hematocrit 16.0 (LL) 37.0 - 48.5 %    MCV 98 82 - 98 fL    MCH 33.5 (H) 27.0 - 31.0 pg    MCHC 34.4 32.0 - 36.0 g/dL    RDW 16.6 (H) 11.5 - 14.5 %    Platelets 6 (LL) 150 - 350 K/uL    MPV SEE COMMENT 9.2 - 12.9 fL    Gran # (ANC) 1.6 (L) 1.8 - 7.7 K/uL    Immature Grans (Abs) 1.44 (H) 0.00 - 0.04 K/uL   Type & Screen    Collection Time: 03/21/18  7:14 AM   Result Value Ref Range    Group & Rh A POS     Indirect Keyana POS    Prepare RBC 1 Unit    Collection Time: 03/21/18  7:14 AM   Result Value Ref Range    UNIT NUMBER [id number]     PRODUCT CODE C4649R51     DISPENSE STATUS TRANSFUSED     CODING SYSTEM [id number]     Unit Blood Type Code 6200     Unit Blood Type A POS     Unit Expiration [id number]    Prepare RBC 2 Units; active hemolysis    Collection Time: 03/21/18  7:14 AM   Result Value Ref Range    UNIT NUMBER [id number]     PRODUCT CODE P7958B42      DISPENSE STATUS ISSUED     CODING SYSTEM YEXC084     Unit Blood Type Code 6200     Unit Blood Type A POS     Unit Expiration [id number]     UNIT NUMBER [id number]     PRODUCT CODE K9558H05     DISPENSE STATUS TRANSFUSED     CODING SYSTEM BWWP707     Unit Blood Type Code 6200     Unit Blood Type A POS     Unit Expiration [id number]    Prepare Platelets 1 Dose    Collection Time: 03/21/18  7:14 AM   Result Value Ref Range    UNIT NUMBER [id number]     PRODUCT CODE J3758S46     DISPENSE STATUS ISSUED     CODING SYSTEM KBAT615     Unit Blood Type Code 6200     Unit Blood Type A POS     Unit Expiration [id number]    CBC auto differential    Collection Time: 03/21/18  7:42 PM   Result Value Ref Range    WBC 6.28 3.90 - 12.70 K/uL    RBC 1.87 (L) 4.00 - 5.40 M/uL    Hemoglobin 6.1 (L) 12.0 - 16.0 g/dL    Hematocrit 17.8 (LL) 37.0 - 48.5 %    MCV 95 82 - 98 fL    MCH 32.6 (H) 27.0 - 31.0 pg    MCHC 34.3 32.0 - 36.0 g/dL    RDW 16.1 (H) 11.5 - 14.5 %    Platelets 7 (LL) 150 - 350 K/uL    MPV SEE COMMENT 9.2 - 12.9 fL    Immature Granulocytes CANCELED 0.0 - 0.5 %    Immature Grans (Abs) CANCELED 0.00 - 0.04 K/uL    nRBC 0 0 /100 WBC    Gran% 33.0 (L) 38.0 - 73.0 %    Lymph% 25.0 18.0 - 48.0 %    Mono% 3.0 (L) 4.0 - 15.0 %    Eosinophil% 0.0 0.0 - 8.0 %    Basophil% 0.0 0.0 - 1.9 %    Metamyelocytes 13.0 %    Myelocytes 21.0 %    Promyelocytes 2.0 %    Blasts 3.0 (A) 0 %    Platelet Estimate Decreased (A)     Aniso Slight     Poik Slight     Poly Occasional     Hypo Occasional     Ovalocytes Occasional     Basophilic Stippling Occasional     Differential Method Manual      Significant Imaging: No new imaging or procedures to review since prior assessment      Assessment/Plan:      * MDS (myelodysplastic syndrome)    HgB dropped again, concern for AML, s/p Bone marrow 3/21, Will transfuse today s/p 2 units of blood and 2 units of platelets.   - Diagnosed recently at OSH via bone marrow biopsy. Report found in  chart. 7% myeloblasts noted  - Has not [place] [facility] [place]   - Hem-Onc following, recommending supportive transfusions for hemoglobin goal >7g/dL and platelet goal >10k  - Continue dexamethasone 40mg daily for 4 days to treat for potential ITP in the setting of MDS.  BM biopsy done of lef posterior superior iliac crest, path results returned, will f/u with Heme/onc recs today       Symptomatic anemia    Continues to require PRBC transfusions      Thrombocytopenia, unspecified    S/p 2u platelet transfusion without appropriate response in platelet count  -Heme/onc following, recommending HLA matched platelets still pending, will monitor levels closely  -Ultimate goal is to Transfuse to a goal of greater than 10k       Mild intermittent asthma without complication    - well controlled   -Albuterol PRN       Tobacco abuse    - nicotine patch       Essential hypertension    - patient reports low blood pressure on current home regiment of nebivolol 5, irbesartan 300, and HCTZ 12.5. She sometimes holds irbesartan on her own   - resume nebivolol  - Hold HCTZ due to hypokalemia   - Can re-start irbesartan if BP not well controlled        VTE Risk Mitigation         Ordered     IP VTE LOW RISK PATIENT  Once      03/17/18 0214     Place sequential compression device  Until discontinued      03/17/18 0214     IP VTE HIGH RISK PATIENT  Once      03/17/18 0214        Darrel Richmond MD  Department of Hospital Medicine   [facility]

## 2018-03-22 NOTE — SUBJECTIVE & OBJECTIVE
Interval History: S/p 2 units PRBCs, 1 unit of platelets, increase in the frequency of cough, given 20mg IV lasix x1.     Review of Systems   Constitutional: Negative for chills, fatigue and fever.   HENT: Negative for sore throat and trouble swallowing.    Eyes: Negative for photophobia, pain and visual disturbance.   Respiratory: Positive for cough. Negative for chest tightness and shortness of breath.    Cardiovascular: Negative for chest pain, palpitations and leg swelling.   Gastrointestinal: Negative for abdominal pain, constipation, diarrhea, nausea and vomiting.   Genitourinary: Negative for difficulty urinating and dysuria.   Musculoskeletal: Negative for arthralgias and back pain.   Skin: Negative for color change and rash.   Neurological: Negative for weakness, numbness and headaches.   Hematological: Bruises/bleeds easily.     Objective:     Vital Signs (Most Recent):  Temp: 98.5 °F (36.9 °C) (03/22/18 0454)  Pulse: 79 (03/22/18 0454)  Resp: 20 (03/22/18 0454)  BP: (!) 114/57 (03/22/18 0454)  SpO2: 95 % (03/22/18 0454) Vital Signs (24h Range):  Temp:  [97.9 °F (36.6 °C)-98.6 °F (37 °C)] 98.5 °F (36.9 °C)  Pulse:  [64-92] 79  Resp:  [18-20] 20  SpO2:  [92 %-98 %] 95 %  BP: (101-186)/(57-88) 114/57     Weight: 77.1 kg (170 lb)  Body mass index is 29.18 kg/m².    Intake/Output Summary (Last 24 hours) at 03/22/18 0623  Last data filed at 03/22/18 0454   Gross per 24 hour   Intake             4976 ml   Output                0 ml   Net             4976 ml      Physical Exam   Constitutional: She is oriented to person, place, and time. She appears well-developed and well-nourished. No distress.   HENT:   Head: Normocephalic and atraumatic.   Mouth/Throat: No oropharyngeal exudate.   Eyes: EOM are normal. Right eye exhibits no discharge. Left eye exhibits no discharge. No scleral icterus.   Cardiovascular: Normal rate, regular rhythm, normal heart sounds and intact distal pulses.  Exam reveals no gallop and no  friction rub.    No murmur heard.  Pulmonary/Chest: Effort normal and breath sounds normal. No respiratory distress. She has no wheezes. She has no rales. She exhibits no tenderness.   Abdominal: Soft. Bowel sounds are normal. She exhibits no distension and no mass. There is no tenderness. There is no rebound and no guarding.   Musculoskeletal: Normal range of motion. She exhibits no edema or tenderness.   Neurological: She is alert and oriented to person, place, and time.   Skin: No rash noted. She is not diaphoretic. No erythema.   Patient with multiple bruises on her upper and lower extremities.   Psychiatric: She has a normal mood and affect. Her behavior is normal.       Significant Labs:   Recent Results (from the past 24 hour(s))   CBC Oncology    Collection Time: 03/21/18  7:14 AM   Result Value Ref Range    WBC 3.89 (L) 3.90 - 12.70 K/uL    RBC 1.64 (L) 4.00 - 5.40 M/uL    Hemoglobin 5.5 (LL) 12.0 - 16.0 g/dL    Hematocrit 16.0 (LL) 37.0 - 48.5 %    MCV 98 82 - 98 fL    MCH 33.5 (H) 27.0 - 31.0 pg    MCHC 34.4 32.0 - 36.0 g/dL    RDW 16.6 (H) 11.5 - 14.5 %    Platelets 6 (LL) 150 - 350 K/uL    MPV SEE COMMENT 9.2 - 12.9 fL    Gran # (ANC) 1.6 (L) 1.8 - 7.7 K/uL    Immature Grans (Abs) 1.44 (H) 0.00 - 0.04 K/uL   Type & Screen    Collection Time: 03/21/18  7:14 AM   Result Value Ref Range    Group & Rh A POS     Indirect Keyana POS    Prepare RBC 1 Unit    Collection Time: 03/21/18  7:14 AM   Result Value Ref Range    UNIT NUMBER [id number]     PRODUCT CODE J4980D00     DISPENSE STATUS TRANSFUSED     CODING SYSTEM JHGN416     Unit Blood Type Code 6200     Unit Blood Type A POS     Unit Expiration [id number]    Prepare RBC 2 Units; active hemolysis    Collection Time: 03/21/18  7:14 AM   Result Value Ref Range    UNIT NUMBER [id number]     PRODUCT CODE G1579A04     DISPENSE STATUS ISSUED     CODING SYSTEM LVZE221     Unit Blood Type Code 6200     Unit Blood Type A POS     Unit Expiration [id number]      UNIT NUMBER [id number]     PRODUCT CODE U0649W62     DISPENSE STATUS TRANSFUSED     CODING SYSTEM HWSN709     Unit Blood Type Code 6200     Unit Blood Type A POS     Unit Expiration [id number]    Prepare Platelets 1 Dose    Collection Time: 03/21/18  7:14 AM   Result Value Ref Range    UNIT NUMBER [id number]     PRODUCT CODE F3294J68     DISPENSE STATUS ISSUED     CODING SYSTEM STPQ404     Unit Blood Type Code 6200     Unit Blood Type A POS     Unit Expiration [id number]    CBC auto differential    Collection Time: 03/21/18  7:42 PM   Result Value Ref Range    WBC 6.28 3.90 - 12.70 K/uL    RBC 1.87 (L) 4.00 - 5.40 M/uL    Hemoglobin 6.1 (L) 12.0 - 16.0 g/dL    Hematocrit 17.8 (LL) 37.0 - 48.5 %    MCV 95 82 - 98 fL    MCH 32.6 (H) 27.0 - 31.0 pg    MCHC 34.3 32.0 - 36.0 g/dL    RDW 16.1 (H) 11.5 - 14.5 %    Platelets 7 (LL) 150 - 350 K/uL    MPV SEE COMMENT 9.2 - 12.9 fL    Immature Granulocytes CANCELED 0.0 - 0.5 %    Immature Grans (Abs) CANCELED 0.00 - 0.04 K/uL    nRBC 0 0 /100 WBC    Gran% 33.0 (L) 38.0 - 73.0 %    Lymph% 25.0 18.0 - 48.0 %    Mono% 3.0 (L) 4.0 - 15.0 %    Eosinophil% 0.0 0.0 - 8.0 %    Basophil% 0.0 0.0 - 1.9 %    Metamyelocytes 13.0 %    Myelocytes 21.0 %    Promyelocytes 2.0 %    Blasts 3.0 (A) 0 %    Platelet Estimate Decreased (A)     Aniso Slight     Poik Slight     Poly Occasional     Hypo Occasional     Ovalocytes Occasional     Basophilic Stippling Occasional     Differential Method Manual      Significant Imaging: No new imaging or procedures to review since prior assessment

## 2018-03-22 NOTE — PROGRESS NOTES
[facility]  Hematology/Oncology  Progress Note    Patient Name: Brooke Olivas  Admission Date: 3/16/2018  Hospital Length of Stay: 5 days  Code Status: Full Code     Subjective:     HPI:  Pt is a 61 yo F with h/o HTN who states she initially started having bruising around January 28th an her extremities and eventually presented to her PCP on February 12th for evaluation with blood work.  The patient states she was found to have platelets in the 25k range.  She eventually underwent a BM biopsy on 3/08/18 showing MDS with 7% blasts in the BM marrow.  The patient was to [place] Hopkins [place] [facility]  On lab check the patient was noted to have anemia with hemoglobin of  ~5 and plt count of 7k.  Currently the patient complains of cough easy bruising.  The patient denies fever, chills, night sweats, weight loss, new lumps or bumps.      Pt is a 61 yo F with AMS with subsequent anemia and thrombocytopenia unresponsive to steroids.      Interval History: Pt with no acute events overnight.  Pt denies fever, chills, CP, SOB, cough, N/V, constipation, diarrhea    Oncology Treatment Plan:   [No treatment plan]    Medications:  Continuous Infusions:  Scheduled Meds:   aminocaproic acid  2 g Oral Once    dexamethasone  40 mg Intravenous Daily    nebivolol  5 mg Oral Daily    nicotine  1 patch Transdermal Daily     PRN Meds:sodium chloride, sodium chloride, sodium chloride, sodium chloride, sodium chloride, acetaminophen, albuterol, lidocaine HCL 10 mg/ml (1%), ondansetron, oxyCODONE, senna-docusate 8.6-50 mg, sodium chloride 0.9%     Review of Systems   Constitutional: Positive for diaphoresis. Negative for chills and fever.   Respiratory: Negative for cough and shortness of breath.    Cardiovascular: Negative for chest pain and palpitations.   Gastrointestinal: Negative for abdominal pain, constipation, diarrhea, nausea and vomiting.   Skin: Negative for rash.   Neurological: Negative for  headaches.     Objective:     Vital Signs (Most Recent):  Temp: 98.7 °F (37.1 °C) (03/22/18 1507)  Pulse: 70 (03/22/18 1507)  Resp: 18 (03/22/18 1507)  BP: 137/67 (03/22/18 1507)  SpO2: 96 % (03/22/18 1507) Vital Signs (24h Range):  Temp:  [97.9 °F (36.6 °C)-98.7 °F (37.1 °C)] 98.7 °F (37.1 °C)  Pulse:  [64-92] 70  Resp:  [16-20] 18  SpO2:  [95 %-98 %] 96 %  BP: (101-139)/(57-88) 137/67     Weight: 80.7 kg (178 lb)  Body mass index is 30.55 kg/m².  Body surface area is 1.91 meters squared.      Intake/Output Summary (Last 24 hours) at 03/22/18 1714  Last data filed at 03/22/18 0946   Gross per 24 hour   Intake             1492 ml   Output                0 ml   Net             1492 ml       Physical Exam   Constitutional: She is oriented to person, place, and time. She appears well-developed and well-nourished.   Eyes: EOM are normal. Right eye exhibits no discharge. Left eye exhibits no discharge.   Cardiovascular: Normal rate, regular rhythm and normal heart sounds.  Exam reveals no gallop and no friction rub.    No murmur heard.  Pulmonary/Chest: Effort normal and breath sounds normal. No respiratory distress. She has no wheezes. She has no rales.   Abdominal: Soft. Bowel sounds are normal. She exhibits no distension. There is no tenderness. There is no rebound and no guarding.   Neurological: She is alert and oriented to person, place, and time.   Skin:   Patient with diffuse bruising on arm and legs.       Significant Labs:   Recent Results (from the past 24 hour(s))   CBC auto differential    Collection Time: 03/21/18  7:42 PM   Result Value Ref Range    WBC 6.28 3.90 - 12.70 K/uL    RBC 1.87 (L) 4.00 - 5.40 M/uL    Hemoglobin 6.1 (L) 12.0 - 16.0 g/dL    Hematocrit 17.8 (LL) 37.0 - 48.5 %    MCV 95 82 - 98 fL    MCH 32.6 (H) 27.0 - 31.0 pg    MCHC 34.3 32.0 - 36.0 g/dL    RDW 16.1 (H) 11.5 - 14.5 %    Platelets 7 (LL) 150 - 350 K/uL    MPV SEE COMMENT 9.2 - 12.9 fL    Immature Granulocytes CANCELED 0.0 - 0.5 %     Immature Grans (Abs) CANCELED 0.00 - 0.04 K/uL    nRBC 0 0 /100 WBC    Gran% 33.0 (L) 38.0 - 73.0 %    Lymph% 25.0 18.0 - 48.0 %    Mono% 3.0 (L) 4.0 - 15.0 %    Eosinophil% 0.0 0.0 - 8.0 %    Basophil% 0.0 0.0 - 1.9 %    Metamyelocytes 13.0 %    Myelocytes 21.0 %    Promyelocytes 2.0 %    Blasts 3.0 (A) 0 %    Platelet Estimate Decreased (A)     Aniso Slight     Poik Slight     Poly Occasional     Hypo Occasional     Ovalocytes Occasional     Basophilic Stippling Occasional     Differential Method Manual    Basic metabolic panel    Collection Time: 03/22/18  7:22 AM   Result Value Ref Range    Sodium 137 136 - 145 mmol/L    Potassium 4.2 3.5 - 5.1 mmol/L    Chloride 105 95 - 110 mmol/L    CO2 22 (L) 23 - 29 mmol/L    Glucose 115 (H) 70 - 110 mg/dL    BUN, Bld 29 (H) 8 - 23 mg/dL    Creatinine 0.8 0.5 - 1.4 mg/dL    Calcium 9.3 8.7 - 10.5 mg/dL    Anion Gap 10 8 - 16 mmol/L    eGFR if African American >60.0 >60 mL/min/1.73 m^2    eGFR if non African American >60.0 >60 mL/min/1.73 m^2   CBC auto differential    Collection Time: 03/22/18  7:22 AM   Result Value Ref Range    WBC 8.54 3.90 - 12.70 K/uL    RBC 2.55 (L) 4.00 - 5.40 M/uL    Hemoglobin 8.1 (L) 12.0 - 16.0 g/dL    Hematocrit 23.6 (L) 37.0 - 48.5 %    MCV 93 82 - 98 fL    MCH 31.8 (H) 27.0 - 31.0 pg    MCHC 34.3 32.0 - 36.0 g/dL    RDW 14.9 (H) 11.5 - 14.5 %    Platelets 8 (LL) 150 - 350 K/uL    MPV 13.6 (H) 9.2 - 12.9 fL    Immature Granulocytes CANCELED 0.0 - 0.5 %    Immature Grans (Abs) CANCELED 0.00 - 0.04 K/uL    nRBC 0 0 /100 WBC    Gran% 40.0 38.0 - 73.0 %    Lymph% 9.0 (L) 18.0 - 48.0 %    Mono% 20.0 (H) 4.0 - 15.0 %    Eosinophil% 0.0 0.0 - 8.0 %    Basophil% 0.0 0.0 - 1.9 %    Bands 1.0 %    Metamyelocytes 3.0 %    Myelocytes 14.0 %    Promyelocytes 10.0 %    Blasts 3.0 (A) 0 %    Platelet Estimate Decreased (A)     Aniso Slight     Poik Slight     Poly Occasional     Hypo Occasional     Ovalocytes Occasional     [place] Cells Occasional      Differential Method Manual          Diagnostic Results:  Imagine Reviewed    BM Biopsy 3/20/18  --HYPERCELLULAR MARROW (90-95%) WITH MYELODYSPLASTIC SYNDROME WITH EXCESS BLASTS 2  --EXTENSIVE RETICULIN MYELOFIBROSIS (MF 2 OF 3).  MDS fluorescent in situ hybridization study (3/16/2018) detects 5q deletion and  amplification of the MLL gene region (at 11q23) in approximately 80% of nuclei. In addition, three copies of  [id number] (at 7q31) was observed in 56% of nuclei.    Assessment/Plan:     * MDS (myelodysplastic syndrome) with excess blasts    -Repeat BM biopsy shows MDS with 12% blasts  -Patient has complex cytogenetics  -Would start the patient on Amicar 2g every 8 hours form her profound thrombocytopenia in order to reduce the risk of potential bleeding.  -The patient has an appointment with Dr Hopkins at 10AM tomorrow to discuss further treatment options with Azacitidine or other hypomethylating agents.  -OK to discharge the patient from a Heme/Onc perspective.  -The patient was educated on symptoms to watch for requiring immediate medical attention including but not limited to severe bleeding.  -Case Discussed with Dr Hopkins.                 Blaise Valle MD  Hematology/Oncology  [facility]

## 2018-03-22 NOTE — PLAN OF CARE
Problem: Patient Care Overview  Goal: Plan of Care Review  Outcome: Ongoing (interventions implemented as appropriate)  Pt AAOx4. Pt concerned regarding having gas but no bowel movement; PRN senna-docusate administered.  Pt has remained free from injury this shift. Pt had no c/o nausea or pain this shift. Nebivolol continues to be held to Blood Pressure; last /67. Abundant bruising noted to upper bilateral extremities. Diffuse bruising noted throughout. Pt's daughter at bedside and involved in care. Bed in low locked position. Call light and personal belongings within reach. Side rails up x2. Nonskid socks in place. All questions and comments addressed at this time. Will continue to monitor.  Fall, Pressure ulcer, infection precautions continued.

## 2018-03-22 NOTE — SUBJECTIVE & OBJECTIVE
Pt is a 63 yo F with AMS with subsequent anemia and thrombocytopenia unresponsive to steroids.      Interval History: Pt with no acute events overnight.  Pt denies fever, chills, CP, SOB, cough, N/V, constipation, diarrhea    Oncology Treatment Plan:   [No treatment plan]    Medications:  Continuous Infusions:  Scheduled Meds:   aminocaproic acid  2 g Oral Once    dexamethasone  40 mg Intravenous Daily    nebivolol  5 mg Oral Daily    nicotine  1 patch Transdermal Daily     PRN Meds:sodium chloride, sodium chloride, sodium chloride, sodium chloride, sodium chloride, acetaminophen, albuterol, lidocaine HCL 10 mg/ml (1%), ondansetron, oxyCODONE, senna-docusate 8.6-50 mg, sodium chloride 0.9%     Review of Systems   Constitutional: Positive for diaphoresis. Negative for chills and fever.   Respiratory: Negative for cough and shortness of breath.    Cardiovascular: Negative for chest pain and palpitations.   Gastrointestinal: Negative for abdominal pain, constipation, diarrhea, nausea and vomiting.   Skin: Negative for rash.   Neurological: Negative for headaches.     Objective:     Vital Signs (Most Recent):  Temp: 98.7 °F (37.1 °C) (03/22/18 1507)  Pulse: 70 (03/22/18 1507)  Resp: 18 (03/22/18 1507)  BP: 137/67 (03/22/18 1507)  SpO2: 96 % (03/22/18 1507) Vital Signs (24h Range):  Temp:  [97.9 °F (36.6 °C)-98.7 °F (37.1 °C)] 98.7 °F (37.1 °C)  Pulse:  [64-92] 70  Resp:  [16-20] 18  SpO2:  [95 %-98 %] 96 %  BP: (101-139)/(57-88) 137/67     Weight: 80.7 kg (178 lb)  Body mass index is 30.55 kg/m².  Body surface area is 1.91 meters squared.      Intake/Output Summary (Last 24 hours) at 03/22/18 1714  Last data filed at 03/22/18 0946   Gross per 24 hour   Intake             1492 ml   Output                0 ml   Net             1492 ml       Physical Exam   Constitutional: She is oriented to person, place, and time. She appears well-developed and well-nourished.   Eyes: EOM are normal. Right eye exhibits no discharge.  Left eye exhibits no discharge.   Cardiovascular: Normal rate, regular rhythm and normal heart sounds.  Exam reveals no gallop and no friction rub.    No murmur heard.  Pulmonary/Chest: Effort normal and breath sounds normal. No respiratory distress. She has no wheezes. She has no rales.   Abdominal: Soft. Bowel sounds are normal. She exhibits no distension. There is no tenderness. There is no rebound and no guarding.   Neurological: She is alert and oriented to person, place, and time.   Skin:   Patient with diffuse bruising on arm and legs.       Significant Labs:   Recent Results (from the past 24 hour(s))   CBC auto differential    Collection Time: 03/21/18  7:42 PM   Result Value Ref Range    WBC 6.28 3.90 - 12.70 K/uL    RBC 1.87 (L) 4.00 - 5.40 M/uL    Hemoglobin 6.1 (L) 12.0 - 16.0 g/dL    Hematocrit 17.8 (LL) 37.0 - 48.5 %    MCV 95 82 - 98 fL    MCH 32.6 (H) 27.0 - 31.0 pg    MCHC 34.3 32.0 - 36.0 g/dL    RDW 16.1 (H) 11.5 - 14.5 %    Platelets 7 (LL) 150 - 350 K/uL    MPV SEE COMMENT 9.2 - 12.9 fL    Immature Granulocytes CANCELED 0.0 - 0.5 %    Immature Grans (Abs) CANCELED 0.00 - 0.04 K/uL    nRBC 0 0 /100 WBC    Gran% 33.0 (L) 38.0 - 73.0 %    Lymph% 25.0 18.0 - 48.0 %    Mono% 3.0 (L) 4.0 - 15.0 %    Eosinophil% 0.0 0.0 - 8.0 %    Basophil% 0.0 0.0 - 1.9 %    Metamyelocytes 13.0 %    Myelocytes 21.0 %    Promyelocytes 2.0 %    Blasts 3.0 (A) 0 %    Platelet Estimate Decreased (A)     Aniso Slight     Poik Slight     Poly Occasional     Hypo Occasional     Ovalocytes Occasional     Basophilic Stippling Occasional     Differential Method Manual    Basic metabolic panel    Collection Time: 03/22/18  7:22 AM   Result Value Ref Range    Sodium 137 136 - 145 mmol/L    Potassium 4.2 3.5 - 5.1 mmol/L    Chloride 105 95 - 110 mmol/L    CO2 22 (L) 23 - 29 mmol/L    Glucose 115 (H) 70 - 110 mg/dL    BUN, Bld 29 (H) 8 - 23 mg/dL    Creatinine 0.8 0.5 - 1.4 mg/dL    Calcium 9.3 8.7 - 10.5 mg/dL    Anion Gap 10 8 -  16 mmol/L    eGFR if African American >60.0 >60 mL/min/1.73 m^2    eGFR if non African American >60.0 >60 mL/min/1.73 m^2   CBC auto differential    Collection Time: 03/22/18  7:22 AM   Result Value Ref Range    WBC 8.54 3.90 - 12.70 K/uL    RBC 2.55 (L) 4.00 - 5.40 M/uL    Hemoglobin 8.1 (L) 12.0 - 16.0 g/dL    Hematocrit 23.6 (L) 37.0 - 48.5 %    MCV 93 82 - 98 fL    MCH 31.8 (H) 27.0 - 31.0 pg    MCHC 34.3 32.0 - 36.0 g/dL    RDW 14.9 (H) 11.5 - 14.5 %    Platelets 8 (LL) 150 - 350 K/uL    MPV 13.6 (H) 9.2 - 12.9 fL    Immature Granulocytes CANCELED 0.0 - 0.5 %    Immature Grans (Abs) CANCELED 0.00 - 0.04 K/uL    nRBC 0 0 /100 WBC    Gran% 40.0 38.0 - 73.0 %    Lymph% 9.0 (L) 18.0 - 48.0 %    Mono% 20.0 (H) 4.0 - 15.0 %    Eosinophil% 0.0 0.0 - 8.0 %    Basophil% 0.0 0.0 - 1.9 %    Bands 1.0 %    Metamyelocytes 3.0 %    Myelocytes 14.0 %    Promyelocytes 10.0 %    Blasts 3.0 (A) 0 %    Platelet Estimate Decreased (A)     Aniso Slight     Poik Slight     Poly Occasional     Hypo Occasional     Ovalocytes Occasional     [place] Cells Occasional     Differential Method Manual          Diagnostic Results:  Imagine Reviewed    BM Biopsy 3/20/18  --HYPERCELLULAR MARROW (90-95%) WITH MYELODYSPLASTIC SYNDROME WITH EXCESS BLASTS 2  --EXTENSIVE RETICULIN MYELOFIBROSIS (MF 2 OF 3).  MDS fluorescent in situ hybridization study (3/16/2018) detects 5q deletion and  amplification of the MLL gene region (at 11q23) in approximately 80% of nuclei. In addition, three copies of  [id number] (at 7q31) was observed in 56% of nuclei.

## 2018-03-22 NOTE — PLAN OF CARE
Problem: Patient Care Overview  Goal: Plan of Care Review  Outcome: Ongoing (interventions implemented as appropriate)  Premedicated with [place], 2 Units PRBC's and 1 unit platelets given overnight. Well tolerated. Cough more frequent and productive. Lasix 20 mg IVP ordered.m NAD. VSS. Denies pain. Widespread bruising to arms and extensive amount of bruising to body. Daughter at bedside. Pt remains free from falls or injury. Bed low and locked, call light and personal belongings within reach. Will continue to monitor.

## 2018-03-23 NOTE — PROGRESS NOTES
Hematology and Medical Oncology   New Patient Consult     03/23/2018    Primary Hematologic Diagnosis: High Grade MDS    History of Present Ilness:   Brooke Olivas (Brooke) is a pleasant 62 y.o.female who recently transferred care to [facility] from [facility].    Hematology History:  --Began noticing bruising around January 28th, 2018 on her extremities and  presented to the PCP on February 12th for evaluation with blood work.  The patient states she was found to have platelets in the 25k range.    --Bone marrow biopsy on 3/08/18 showed MDS with 7% blasts in the BM marrow. --On first lab check at [facility] she was noted to have anemia with hemoglobin of  ~5 and plt count of 7k.  --[place] [facility] [place]3/22/18 for refractory pancytopenia. 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 12 % Blasts. 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 In addition, three copies of [id number] (at 7q31) was observed in 56% of nuclei.      Ms Olivas presents today with her daughters to discuss her diagnosis and possible treatment options. Since hospital discharge she has received multiple transfusions. She can now tell when her hemoglobin is low, she will get dyspneic when walking 10-20 feet. There are numerous bruises covering her whole body, but no active signs of bleed.    PAST MEDICAL HISTORY:   Past Medical History:   Diagnosis Date    Essential hypertension 3/17/2018    Mild asthma 3/17/2018    Smoker        PAST SURGICAL HISTORY:   No past surgical history on file.    PAST SOCIAL HISTORY:   reports that she has never smoked. She has never used smokeless tobacco. She reports that she does not drink alcohol or use drugs..    FAMILY HISTORY:  No family history on file.    CURRENT MEDICATIONS:   Current Outpatient Prescriptions    Medication Sig    albuterol (VENTOLIN HFA) 90 mcg/actuation inhaler Inhale 1 puff into the lungs every 6 (six) hours as needed for Wheezing or Shortness of Breath. Rescue    aminocaproic acid 1,000 mg Tab Take 2,000 mg by mouth every 8 (eight) hours.    hydroCHLOROthiazide (MICROZIDE) 12.5 mg capsule Take 12.5 mg by mouth once daily.    irbesartan (AVAPRO) 300 MG tablet Take 300 mg by mouth once daily.    nebivolol (BYSTOLIC) 5 MG Tab Take 5 mg by mouth once daily.      No current facility-administered medications for this visit.      ALLERGIES:   Review of patient's allergies indicates:   Allergen Reactions    Penicillins     Sulfa (sulfonamide antibiotics)          Review of Systems:     Review of Systems   Constitutional: Positive for fatigue and unexpected weight change. Negative for appetite change, chills, diaphoresis and fever.   HENT:   Negative for hearing loss, mouth sores, nosebleeds, sore throat, trouble swallowing and voice change.    Eyes: Negative for eye problems and icterus.   Respiratory: Negative for chest tightness, cough, hemoptysis, shortness of breath and wheezing.    Cardiovascular: Negative for chest pain, leg swelling and palpitations.   Gastrointestinal: Negative for abdominal distention, abdominal pain, blood in stool, diarrhea, nausea and vomiting.   Endocrine: Negative for hot flashes.   Genitourinary: Negative for bladder incontinence, difficulty urinating, dysuria and hematuria.    Musculoskeletal: Negative for arthralgias, back pain, flank pain, gait problem, myalgias, neck pain and neck stiffness.   Skin: Negative for itching, rash and wound.   Neurological: Negative for dizziness, extremity weakness, gait problem, headaches, numbness, seizures and speech difficulty.   Hematological: Negative for adenopathy. Bruises/bleeds easily.   Psychiatric/Behavioral: Negative for confusion, depression and sleep disturbance. The patient is not nervous/anxious.         Physical Exam:      Vitals:    03/23/18 1027   BP: (!) 140/80   Pulse: 60   Temp: 97.8 °F (36.6 °C)     Physical Exam   Constitutional: She is oriented to person, place, and time. She appears well-developed and well-nourished. No distress.   HENT:   Head: Normocephalic and atraumatic.   Mouth/Throat: Oropharynx is clear and moist. No oropharyngeal exudate.   Eyes: Conjunctivae and EOM are normal. Pupils are equal, round, and reactive to light.   Neck: Normal range of motion. Neck supple. No JVD present. No tracheal deviation present. No thyromegaly present.   Cardiovascular: Normal rate, regular rhythm and normal heart sounds.  Exam reveals no friction rub.    No murmur heard.  Pulmonary/Chest: Effort normal and breath sounds normal. No stridor. No respiratory distress. She has no wheezes. She has no rales. She exhibits no tenderness.   Abdominal: Soft. Bowel sounds are normal. She exhibits no distension. There is no tenderness. There is no rebound and no guarding.   Musculoskeletal: Normal range of motion. She exhibits no edema, tenderness or deformity.   Lymphadenopathy:     She has no axillary adenopathy.   Neurological: She is alert and oriented to person, place, and time. She displays normal reflexes. No cranial nerve deficit or sensory deficit. She exhibits normal muscle tone. Coordination normal.   Skin: Skin is warm and dry. Capillary refill takes less than 2 seconds. No rash noted. She is not diaphoretic. No erythema. No pallor.   Diffuse exchymosis   Psychiatric: She has a normal mood and affect. Her behavior is normal. Judgment and thought content normal.       ECOG Performance Status: (foot note - ECOG PS provided by Eastern Cooperative Oncology Group) 1 - Symptomatic but completely ambulatory    Karnofsky Performance Score:  80%- Normal Activity with Effort: Some Symptoms of Disease    Labs:   Lab Results   Component Value Date    WBC 3.78 (L) 03/26/2018    HGB 9.9 (L) 03/26/2018    HCT 27.9 (L) 03/26/2018    PLT 6  (LL) 03/26/2018    ALT 10 03/17/2018    AST 14 03/17/2018     03/22/2018    K 4.2 03/22/2018     03/22/2018    CREATININE 0.8 03/22/2018    BUN 29 (H) 03/22/2018    CO2 22 (L) 03/22/2018       Imaging:  Previous imaging has been reviewed    Assessment and Plan:     Ms. Olivas is a pleasant 62 year old female with newly diagnosed high grade MDS    MDS (myelodysplastic syndrome) with 5q deletion  --12% blasts on the most recent bone marrow biopsy  --Discussion of Azacitadine vs Induction treatment were discussed  --We also discussed at length the only curative treatment for this syndrome being an allogenic transplant. She has at least 2 full siblings.  --At the next clinic visit we will have both the transplant coordinators and clinical trial nurses meet with us. We will start the process of typing for a possible future transplant.  --She is an excellent candidate for  trial for MDS/AML    Anemia  --Transfusion dependent  --Plan on twice a week labs with transfusion PRN  --2 Antibodies have been identified    Thrombocytopenia  --Refractory to transfusions  --Continue on amicar for now  ----We discussed the signs and symptoms of a major bleed including headache, blurry vision and signs of bleeding that should have him present to the Emergency Room for immediate attention    Supportive Care  --Will place a port for more reliable IV access    Hypertension   --Managed per PCP  --Continue home medication    60 minutes were spent face to face with the patient and her  family to discuss the disease, natural history, treatment options and survival statistics. I have provided the patient with an opportunity to ask questions and have all questions answered to her satisfaction.       she will return to clinic next week, but knows to call in the interim if symptoms change or should a problem arise.        Stephanie Hopkins MD  Hematology and Medical Oncology  Bone Marrow Transplant  [facility]

## 2018-03-23 NOTE — PLAN OF CARE
Problem: Patient Care Overview  Goal: Discharge Needs Assessment  Outcome: Ongoing (interventions implemented as appropriate)  Pt tolerated 2 units PRBCs and 1unit plts well no transfusion related reaction noted, PIV d/c'd covered with dry dressing, pt leaves clinic with daughter avs  Given future appointments reviewed, will rtn for lab appointment 3/26/17. All questions/ concerns answered to pt satisfaction. Pt encouraged to call MD office with any other questions. Understanding verbalized

## 2018-03-26 NOTE — PATIENT INSTRUCTIONS
Thrombocytopenia  Thrombocytopenia occurs when there are fewer platelets in the blood than normal. Platelets (also called thrombocytes) are blood cells that are needed for clotting. They help stop or control bleeding when you have a cut or wound. Thrombocytopenia can range from mild to severe. This depends on the number of platelets in your blood. If you have severe thrombocytopenia, you're at higher risk for bruising and bleeding.    What causes thrombocytopenia?  Platelets and other blood cells are made in the bone marrow. This is the soft, spongy part inside bones. Thrombocytopenia can result when:  · The bone marrow doesn't make enough platelets.  · Platelets are destroyed by the body at a rate faster than they can be made in the bone marrow.  · Platelets become trapped in an enlarged spleen.  These problems can occur due to many reasons, including:  · Certain conditions that affect how platelets are made in the bone marrow, such as aplastic anemia, leukemia, and lymphoma  · Certain medicines, such as some types of antibiotics, anti-seizure medicines, and chemotherapy drugs  · Certain viral infections, such as varicella (chicken pox), HIV, and Latasha-Barr virus  · Certain immune problems, such as lupus and immune thrombocytopenic purpura (ITP)  · Certain conditions that can cause an enlarged spleen, such as cirrhosis and cancer  · Alcohol abuse  · Pregnancy  What are the symptoms of thrombocytopenia?  Possible symptoms include:  · Severe bruising or bleeding  · Small red or purple spots (petechiae) on the skin  · Bleeding gums  · Nosebleeds  · Bleeding from a wound that stops and starts again  · Bloody urine or stool  · Heavy menstrual flow (women only)  How is thrombocytopenia diagnosed?  Your healthcare provider will ask about your symptoms and health history. You will also be examined. Tests will be done to confirm the problem as well. These may include:  · A complete blood cell count (CBC). This test  measures the amounts of the different types of cells in the blood. This includes the number of platelets in the blood (platelet count).  · A blood smear. This test checks for the different types of blood cells in the blood and how they appear. A sample of your blood is spread on a glass slide and viewed under a microscope. A stain is used so the blood cells can be seen.  · A bone marrow aspiration and biopsy. This test checks for problems with how the bone marrow makes blood cells. A needle is used to remove a sample of the bone marrow in your hipbone. The sample is then sent to a lab to be tested for problems.  How is thrombocytopenia treated?  Often, no treatment is needed for thrombocytopenia. Your healthcare provider will monitor your symptoms to see if they improve. Blood tests will also be done to check whether your platelet count returns to normal on its own. If treatment is needed, this may involve:  · Treatment of the underlying cause. For instance, if a medicine is the cause, it may be stopped or changed.  · Platelet transfusions. These help raise the number of healthy platelets in the body.  · Blood transfusions. These help treat blood loss that may occur because of low platelets.  · Medicines. These may be given to help prevent platelets from being destroyed. These may also be given to help the bone marrow make more platelets.  · Surgery to remove the spleen. The spleen helps filter the blood. It also stores some blood cells, including platelets. If the spleen is enlarged, it can store too many platelets. This causes there to be fewer platelets in the blood than normal. Though done less often, removing the spleen may help treat thrombocytopenia in certain cases.  What can I expect for recovery and follow-up?  Thrombocytopenia may be a short-term (acute) problem that has no lasting effects. Or it may be an ongoing (chronic) problem. If your condition is chronic, you may need specific treatments to manage  it. You may also need to take certain steps daily to reduce your risk of bleeding. For instance, you may be told to limit certain activities that increase your risk of injury. You may also be told to avoid drinking alcohol and taking certain medicines, such as aspirin and ibuprofen. These can worsen your symptoms. In addition, you will need to know and watch for signs and symptoms of bleeding.  When to call your healthcare provider  Call 911 if you have:  · Severe bleeding that won't stop (call 911)  · Signs of bleeding in the brain, such as severe headache, dizziness, trouble with balance and coordination, abnormal walk, memory loss, and confusion (call 911)  Call your healthcare provider right away if you have any of these:  · Bruising that spreads or worsens  · Increase of small red or purple spots (petechiae) on the skin  · Bloody urine  · Dark brown or black, tarry, or bloody stools  · Bloody vomit   Date Last Reviewed: 12/1/2016  © 2914-8289 [employer]. [address] [place] [address], [place] [address]. All rights reserved. This information is not intended as a substitute for professional medical care. Always follow your healthcare professional's instructions.

## 2018-03-28 NOTE — PROGRESS NOTES
Wednesday, March 28h, 2018    Protocol: , A Randomized Phase II/III Trial of Novel Therapeutics Versus Azacitidine in Newly Diagnosed Patients with Acute Myeloid Leukemia (AML) or High-Risk Myelodysplastic Syndrome (MDS), Age 60 or Older   Sponsor:  [facility] [place]  IRB #: [id number]  Investigator: Sandeep Garner Initials: PRASANTH DACOSTA     FLT3 Screening Informed Consent Process     After discussion with Dr. Hopkins, Research RN met with patient to discuss above-mentioned clinical trial. Patient visited in clinic, accompanied by daughter, Radha. Patient is awake, alert, and oriented to person, place, and time. She states he was informed that I would be coming to discuss above-mentioned study and agrees to learn more about the trial and discuss participation.        Prior to the Informed Consent (IC) being signed, or any study protocol required data collection, testing, procedure, or intervention being performed, the following was done and/or discussed:  · Patient was given a copy of the IC for review   · Purpose of the study and qualifications to participate   · Confidentiality and HIPAA Authorization for Release of Medical Records for the research trial/ subject's rights/research related injury  · Risk, Benefits, Alternative Treatments, Compensation and Costs  · Participation in the research trial is voluntary and patient may withdraw at anytime  · Contact information for study related questions     Each page of the consent was reviewed. Patient denied questions at this time. Patient verbalized understanding. Contact information for CRC and PI given to patient.  Brooke Olivas signed the informed consent form freely for the FLT3 screening of  research study, as well as the optional studies. Witnessed by Research RN.  See attached documents for Informed Consent Process and copy of signed informed consent. Patient was given signed copy of informed consent form.     Following consent, patient's peripheral blood was collected.  "Research RN emailed Anna Liao, as well as study chairs to determine if peripheral blood is adequate given her peripheral blast count. Upon confirmation from Anna Laio that peripheral blood is accepted, research RN to ship to Anna Liao at [facility] in [place], [place] to determine FLT3 status.  Peripheral blood for optional tests was not collected yet, but will be collected prior to treatment start. Optional studies will be shipped to to [facility] Specimen Repository (Leukemia  Division) [facility] in [place], [place].      Will continue to screen for eligibility while awaiting results. Will consent to "treatment study informed consent" on  if deemed eligible by study and .     Angela Strickland RN, was unable to register patient to OPEN as consenting person due to access issues. Anayeli Fontanez RN, registered patient as "consenting person." Was present during consent discussions.      "

## 2018-03-28 NOTE — PROGRESS NOTES
"Wednesday, March 28h, 2018     Protocol: , A Randomized Phase II/III Trial of Novel Therapeutics Versus Azacitidine in Newly Diagnosed Patients with Acute Myeloid Leukemia (AML) or High-Risk Myelodysplastic Syndrome (MDS), Age 60 or Older   Sponsor:  [facility] [place]  IRB #: [id number]  Investigator: Sandeep  Study ID: [id number]  Pt Initials: F, S        Eligibility Note: Registration (step 1)/Specimen Submission       Patient is eligible for specimen submission for .      Patient is 62 years old; date of birth is   Patient is a MDS-EB-2 bone marrow report from 3/20/18.    No known AML in CNS per H/P 3/26/2018  After consent, patient's peripheral blood was collected per study and shipped to Anna Liao at the [facility] in [place], [place] to determine FLT3 status on 3/28/2018. Okay'd to use peripheral blood due drap tap marrow on 3/20. See communications between sponsor.   Patient agreed to specimen banking however peripheral blood for optional studies was not collected at this time. Will be drawn prior to treatment start. Will be sent to [facility] Specimen Repository (Leukemia  Division) [facility] in [place], [place]   Patient has not received any prior therapy related to AML/MDS per H/P on 3/26/2018  Patient able to swallow oral medications.  Per patient's verbal report, she went through menopause "in her 40s"   No prior malignancies noted per H/P on 3/26/2018  Patient has given written, informed consent to participate in the specimen submission/screening process of .     Will continue to review eligibility for treatment and consent patient to participate in trial per section 5 of protocol.    "

## 2018-03-28 NOTE — PROGRESS NOTES
Hematology and Medical Oncology   Follow Up Visit     03/28/2018    Primary Hematologic Diagnosis: High Grade MDS    History of Present Ilness:   Brooke Olivas (Brooke) is a pleasant 62 y.o.female who recently transferred care to [facility] from [facility].    Hematology History:  --Began noticing bruising around January 28th, 2018 on her extremities and  presented to the PCP on February 12th for evaluation with blood work.  The patient states she was found to have platelets in the 25k range.    --Bone marrow biopsy on 3/08/18 showed MDS with 7% blasts in the BM marrow. --On first lab check at [facility] she was noted to have anemia with hemoglobin of  ~5 and plt count of 7k.  --[place] [facility] [place] for refractory pancytopenia. 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 12 % Blasts. 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 In addition, three copies of [id number] (at 7q31) was observed in 56% of nuclei.    Interval History:  Ms Olivas presents today with her daughter to discuss her current lab work and clinical trial options. Overall she is feeling better with increasing energy. She admits to being quite tearful regarding her diagnosis and being away from home for an unknown amount of time.     There are numerous bruises covering her whole body, but no active signs of bleed.    PAST MEDICAL HISTORY:   Past Medical History:   Diagnosis Date    Essential hypertension 3/17/2018    Mild asthma 3/17/2018    Smoker        PAST SURGICAL HISTORY:   History reviewed. No pertinent surgical history.    PAST SOCIAL HISTORY:   reports that she has never smoked. She has never used smokeless tobacco. She reports that she does not drink alcohol or use drugs..    FAMILY HISTORY:  History reviewed. No pertinent family  history.    CURRENT MEDICATIONS:   Current Outpatient Prescriptions   Medication Sig    albuterol (VENTOLIN HFA) 90 mcg/actuation inhaler Inhale 1 puff into the lungs every 6 (six) hours as needed for Wheezing or Shortness of Breath. Rescue    aminocaproic acid 1,000 mg Tab Take 2,000 mg by mouth every 8 (eight) hours.    hydroCHLOROthiazide (MICROZIDE) 12.5 mg capsule Take 12.5 mg by mouth once daily.    irbesartan (AVAPRO) 300 MG tablet Take 300 mg by mouth once daily.    nebivolol (BYSTOLIC) 5 MG Tab Take 5 mg by mouth once daily.      No current facility-administered medications for this visit.      ALLERGIES:   Review of patient's allergies indicates:   Allergen Reactions    Penicillins     Sulfa (sulfonamide antibiotics)          Review of Systems:     Review of Systems   Constitutional: Positive for fatigue and unexpected weight change. Negative for appetite change, chills, diaphoresis and fever.   HENT:   Negative for hearing loss, mouth sores, nosebleeds, sore throat, trouble swallowing and voice change.    Eyes: Negative for eye problems and icterus.   Respiratory: Negative for chest tightness, cough, hemoptysis, shortness of breath and wheezing.    Cardiovascular: Negative for chest pain, leg swelling and palpitations.   Gastrointestinal: Negative for abdominal distention, abdominal pain, blood in stool, diarrhea, nausea and vomiting.   Endocrine: Negative for hot flashes.   Genitourinary: Negative for bladder incontinence, difficulty urinating, dysuria and hematuria.    Musculoskeletal: Negative for arthralgias, back pain, flank pain, gait problem, myalgias, neck pain and neck stiffness.   Skin: Negative for itching, rash and wound.   Neurological: Negative for dizziness, extremity weakness, gait problem, headaches, numbness, seizures and speech difficulty.   Hematological: Negative for adenopathy. Bruises/bleeds easily.   Psychiatric/Behavioral: Negative for confusion, depression and sleep  disturbance. The patient is not nervous/anxious.         Physical Exam:     Vitals:    03/28/18 1015   BP: (!) 142/82   Pulse: 89   Resp: 18   Temp: 99.4 °F (37.4 °C)     Physical Exam   Constitutional: She is oriented to person, place, and time. She appears well-developed and well-nourished. No distress.   HENT:   Head: Normocephalic and atraumatic.   Mouth/Throat: Oropharynx is clear and moist. No oropharyngeal exudate.   Eyes: Conjunctivae and EOM are normal. Pupils are equal, round, and reactive to light.   Neck: Normal range of motion. Neck supple. No JVD present. No tracheal deviation present. No thyromegaly present.   Cardiovascular: Normal rate, regular rhythm and normal heart sounds.  Exam reveals no friction rub.    No murmur heard.  Pulmonary/Chest: Effort normal and breath sounds normal. No stridor. No respiratory distress. She has no wheezes. She has no rales. She exhibits no tenderness.   Abdominal: Soft. Bowel sounds are normal. She exhibits no distension. There is no tenderness. There is no rebound and no guarding.   Musculoskeletal: Normal range of motion. She exhibits no edema, tenderness or deformity.   Lymphadenopathy:     She has no axillary adenopathy.   Neurological: She is alert and oriented to person, place, and time. She displays normal reflexes. No cranial nerve deficit or sensory deficit. She exhibits normal muscle tone. Coordination normal.   Skin: Skin is warm and dry. Capillary refill takes less than 2 seconds. No rash noted. She is not diaphoretic. No erythema. No pallor.   Diffuse exchymosis   Psychiatric: She has a normal mood and affect. Her behavior is normal. Judgment and thought content normal.       ECOG Performance Status: (foot note - ECOG PS provided by Eastern Cooperative Oncology Group) 1 - Symptomatic but completely ambulatory    Karnofsky Performance Score:  80%- Normal Activity with Effort: Some Symptoms of Disease    Labs:   Lab Results   Component Value Date    WBC  4.91 03/28/2018    HGB 9.2 (L) 03/28/2018    HCT 26.1 (L) 03/28/2018    PLT 6 (LL) 03/28/2018    ALT 10 03/17/2018    AST 14 03/17/2018     03/22/2018    K 4.2 03/22/2018     03/22/2018    CREATININE 0.8 03/22/2018    BUN 29 (H) 03/22/2018    CO2 22 (L) 03/22/2018       Imaging:  Previous imaging has been reviewed    Assessment and Plan:     Ms. Olivas is a pleasant 62 year old female with newly diagnosed high grade MDS    MDS (myelodysplastic syndrome) with 5q deletion  --12% blasts on the most recent bone marrow biopsy  --Discussion of Azacitadine vs Induction treatment were discussed  --We also discussed at length the only curative treatment for this syndrome being an allogenic transplant. She has at least 2 full siblings.  --She is not currently a candidate for induction therapy given her physical reserve and blast count  --On medical history review she has no autoimmune issues and has never required treatment   --At the next clinic visit we will have both the transplant coordinators and clinical trial nurses meet with us. We will start the process of typing for a possible future transplant.  --She met with our trial coordinators today to discuss  trial for MDS/AML and to start the pre screening process  --FLT-3 testing has been ordered    Anemia  --Transfusion dependent  --Plan on twice a week labs with transfusion PRN  --2 Antibodies have been identified    Thrombocytopenia  --Refractory to transfusions  --Continue on amicar for now  --We discussed the signs and symptoms of a major bleed including headache, blurry vision and signs of bleeding that should have him present to the Emergency Room for immediate attention    Supportive Care  --Will place a port for more reliable IV access    Hypertension   --Managed per PCP  --Continue home medication    30 minutes were spent face to face with the patient and her  family to discuss the disease, natural history, treatment options and survival  statistics. I have provided the patient with an opportunity to ask questions and have all questions answered to her satisfaction.       she will return to clinic prior to the start of therapy, but knows to call in the interim if symptoms change or should a problem arise.        Stephanie Hopkins MD  Hematology and Medical Oncology  Bone Marrow Transplant  [facility]

## 2018-04-04 NOTE — PLAN OF CARE
Problem: Patient Care Overview  Goal: Plan of Care Review  Outcome: Ongoing (interventions implemented as appropriate)  Transfusion completed, pt tolerated well; provided/reviewed post-transfusion instructions w/ pt and daughter; pt instructed to contact MD for any needs or concerns; declined printed AVS, verbalized understanding of all discussed and next scheduled appt

## 2018-04-04 NOTE — PLAN OF CARE
Problem: Anemia (Adult)  Goal: Symptom Improvement  Patient will demonstrate the desired outcomes by discharge/transition of care.   Outcome: Ongoing (interventions implemented as appropriate)  Pt here for 2 units PRBC, accompanied by daughter, c/o fatigue and diminished appetite, able to eat but reports intermittent nausea (none at present); discussed transfusion procedure, possible s/s of reaction and what to report to RN , consent signed previously, all pt questions answered and pt agrees to proceed

## 2018-04-05 PROBLEM — D46.9 MDS (MYELODYSPLASTIC SYNDROME): Status: ACTIVE | Noted: 2018-01-01

## 2018-04-05 NOTE — PROGRESS NOTES
Received a call from Hematology, that patient's Platelet count is 4.  Dr. Escobedo and Suki muñoz.

## 2018-04-05 NOTE — DISCHARGE SUMMARY
Radiology Discharge Summary      Hospital Course: No complications    Admit Date: 4/5/2018  Discharge Date: 04/05/2018     Instructions Given to Patient: Yes  Diet: Resume prior diet  Activity: activity as tolerated and no driving for today    Description of Condition on Discharge: Stable  Vital Signs (Most Recent): Temp: 98.1 °F (36.7 °C) (04/05/18 1115)  Pulse: 68 (04/05/18 1200)  Resp: 16 (04/05/18 1200)  BP: 138/86 (04/05/18 1200)  SpO2: 99 % (04/05/18 1200)    Discharge Disposition: Home    Discharge Diagnosis: MDS     Follow-up: per Hematology    Ruben Jackson MD  PGY-5  Department of Radiology  [phone]

## 2018-04-05 NOTE — PROGRESS NOTES
Pre-Procedure assessment and documentation complete. Awaiting lab results,H&P note , and consent to begin procedure (Pt and family aware). No complaints at this time.

## 2018-04-05 NOTE — PROGRESS NOTES
"Thursday, April 5th, 2018    Protocol: , A Randomized Phase II/III Trial of Novel Therapeutics Versus Azacitidine in Newly Diagnosed Patients with Acute Myeloid Leukemia (AML) or High-Risk Myelodysplastic Syndrome (MDS), Age 60 or Older   [place]  [facility] [place] [id number]  IRB #: [id number]   Investigator: Sandeep Garner Initials: F, S     Eligibility Note: Registration (step 2)/Treatment Study     Patient is eligible for for .      Patient diagnosed per bone marrow on 3/20/18 with MDS-EB-2.   Patient does not have APL, CML or BCR/ABL per bone marrow report on 3/20/2018 and H&P 3/28/2018  Disease present in bone marrow from 3/20/2018. BM shows 12%  blasts   No known AML in CNS.   Per H/P on 3/28/2018, patient is not a good candidate for induction therapy given her "physical reserve and blast count."   Cytogenetics drawn 3/20/18. FISH drawn 3/16/2018  FLT3 drawn and reviewed centrally. Per protocol, results not provided to institution. Email received that results are inputted and patient can be randomized on 4/4/2018  No prior treatment with hydroxurea per H&P, med list.   No hyperleukocytosis as evidence by WBC on 3/5/2018  of 5.61 K/uL  Per H/P no prior chemotherapy received.   Patient is currently not receiving growth factor support.    Per H/P ECOG 1 dated 3/28/2018  All required labs and procedures completed prior to registration per study calendar section 9.2 of protocol.  Per H/P patient does not have an active autoimmune disease that has required systemic treatment in the past two years.  No known infections  Patient is eligible for Arms A and C of the study, given patients total bilirubin of on 4/3/2018 of 1.8 mg/dL patient is not eligible for arm B.    Labs drawn 4/3/2018 show AST/ALT of 27/37 u/L respectively.  Total bilirubin: 1.8 mg/dL. Troponin: <0.006 ng/mL. Creatinine clearance 90.82 per cockroft gault formula.  Preliminary ECG completed 4/3/2018 shows Qtc: 484  No Known Allergies to midostuarin. "   Patient has given written, informed consent to participate in the specimen submission/screening process of .     Eligibility reviewed and confirmed per Dr. Hopkins prior to initiation of study treatment.  All examinations, labs, and testing performed and results received and reviewed prior to start of treatment.    After consent, patient randomized to ARM-A, Azacitidine. Proposed start date of treatment is 4/9/2018.  Results called and discussed with patient and family, who state understanding.

## 2018-04-05 NOTE — H&P
Radiology History & Physical      SUBJECTIVE:     Chief Complaint: myelodysplastic syndrome    History of Present Illness:  Brooke Olivas is a 62 y.o. female who presents for right chest port placement. Platelets 5, at baseline.     Past Medical History:   Diagnosis Date    Essential hypertension 3/17/2018    Mild asthma 3/17/2018    Smoker      Past Surgical History:   Procedure Laterality Date    TUBAL LIGATION         Home Meds:   Prior to Admission medications    Medication Sig Start Date End Date Taking? Authorizing Provider   acetaminophen (TYLENOL) 325 MG tablet Take 325 mg by mouth 2 (two) times daily.   Yes Historical Provider, MD   albuterol (VENTOLIN HFA) 90 mcg/actuation inhaler Inhale 1 puff into the lungs every 6 (six) hours as needed for Wheezing or Shortness of Breath. Rescue   Yes Historical Provider, MD   aminocaproic acid 1,000 mg Tab Take 2,000 mg by mouth every 8 (eight) hours. 3/22/18 5/21/18 Yes Darrel Richmond MD   hydroCHLOROthiazide (MICROZIDE) 12.5 mg capsule Take 12.5 mg by mouth once daily.   Yes Historical Provider, MD   irbesartan (AVAPRO) 300 MG tablet Take 300 mg by mouth once daily.   Yes Historical Provider, MD   nebivolol (BYSTOLIC) 5 MG Tab Take 5 mg by mouth once daily.    Yes Historical Provider, MD   traMADol (ULTRAM) 50 mg tablet Take 1 tablet (50 mg total) by mouth every 6 (six) hours as needed for Pain. 3/29/18 4/8/18  Stephanie Hopkins MD     Anticoagulants/Antiplatelets: no anticoagulation    Allergies:   Review of patient's allergies indicates:   Allergen Reactions    Cephalosporins Hives    Penicillins     Sulfa (sulfonamide antibiotics)      Sedation History:  no adverse reactions    Review of Systems:   As documented in primary provider H&P.      OBJECTIVE:     Vital Signs (Most Recent)  Temp: 97.9 °F (36.6 °C) (04/05/18 0731)  Pulse: 76 (04/05/18 0731)  Resp: 20 (04/05/18 0731)  BP: 137/87 (04/05/18 0731)  SpO2: 98 % (04/05/18 0731)    Physical  Exam:  ASA: 3  Mallampati: 1      Laboratory  Lab Results   Component Value Date    INR 1.1 04/05/2018       Lab Results   Component Value Date    WBC 5.31 04/05/2018    HGB 9.7 (L) 04/05/2018    HCT 28.1 (L) 04/05/2018    MCV 88 04/05/2018    PLT 5 (LL) 04/03/2018      Lab Results   Component Value Date     04/03/2018     04/03/2018    K 3.4 (L) 04/03/2018    CL 97 04/03/2018    CO2 29 04/03/2018    BUN 15 04/03/2018    CREATININE 0.8 04/03/2018    CALCIUM 9.0 04/03/2018    MG 2.2 03/17/2018    ALT 37 04/03/2018    AST 27 04/03/2018    ALBUMIN 3.4 (L) 04/03/2018    BILITOT 1.8 (H) 04/03/2018    BILIDIR 0.8 (H) 04/03/2018       ASSESSMENT/PLAN:     Sedation Plan: moderate  Patient will undergo right chest port placement.    Mario Escobedo MD  Department of Radiology  Pager: [phone]

## 2018-04-05 NOTE — DISCHARGE INSTRUCTIONS
For scheduling: Call Mellisa at [phone]    For questions or concerns call: [facility] 8 AM- 5PM [phone]. Radiology resident on call [phone].    For immediate concerns that are not emergent, you may call our radiology clinic at: [phone]    Sponge bath only today.  You may shower tomorrow with dressing covered.     Remove dressing after 48 hours and leave open to air. You may wash the site with soap and water at this time.Do not soak the site until completely healed.    If there are skin tapes over the incision, leave them in place and wash over them. The skin tapes should fall off in 7-10 days. If they have not come off by themselves in 10 days ,you may remove them.    Rest and take it easy today. Do not drive for 24 hours. Gradually resume your normal activity.    If the port is in your arm, move you arm and hand normally. DO NOT hold it still.  Avoid lifting heavy objects or overusing the arm.    At home,continue with liquids and if there is no nausea, you may resume your normal diet.    If you have any discomfort, take what you normally take for pain. If it is not effective, call us.    WHEN TO CALL THE DOCTOR:    Obvious bleeding (dried blood over the incision is normal)    Redness or swelling in the affected area    Fever over 101 F (38.4C)    Drainage or pus from the wound    Pain not relieved by normal pain medication    If you have any problems or questions call us:    Normal working hours= [facility] [phone]    24/7= Call the page  at [phone] and ask for the Radiology Resident on call

## 2018-04-05 NOTE — PROCEDURES
Radiology Post-Procedure Note    Pre Op Diagnosis: MDS    Post Op Diagnosis: Same    Procedure: chest PORT placement    Procedure performed by: Wan Mancilla MD and Ruben Jackson MD    Written Informed Consent Obtained: Yes    Specimen Removed: No    Estimated Blood Loss: Minimal    Findings:   Using realtime U/S guidance a 8 Fr port catheter was placed into the right internal jugular vein with tip of the catheter in the cavoatrial junction.    Port is ready for use.     Ruben Jackson MD  PGY-5  Department of Radiology  [phone]

## 2018-04-05 NOTE — PROGRESS NOTES
Pt arrived to [facility] bay [facility]. Report received from JULIETH Combs. Dressing to chest oozing blood. Dr Mancilla and Dr Jackson at bedside assessing.  Family at bedside.

## 2018-04-05 NOTE — PHYSICIAN QUERY
PT Name: Brooke Olivas  MR #: [id number]    Physician Query Form - Hematology Clarification      CDS/: Kisha Vera RN             Contact information: [email or web address]    This form is a permanent document in the medical record.      Query Date: April 5, 2018    By submitting this query, we are merely seeking further clarification of documentation. Please utilize your independent clinical judgment when addressing the question(s) below.    The Medical record contains the following:   Indicators    Supporting Clinical Findings Location in Medical Record   X Anemia, Thrombocytopenia, Neutropenia, Pancytopenia documented 62 y.o. female who is followed for Autoimmune hemolytic anemia  Thrombocytopenia H & P 3/18   X H & H 6.1  17.1 Labs 3/18   X WBC 2.36 Labs 3/18   X Neutrophils 0.7 Labs 3/18    Granulocytes     X Platelets 6 Labs 3/18   X Transfusion(s) Transfuse Platelets 1 Dose     Transfuse RBC 4 units    Blood admin Record 3/18, 3/21 x2  MD orders 3/17   X Treatments: -Heme/onc following, recommending HLA matched platelets  -Transfuse to a goal of greater than 10k  Progress note 3/18 Hospital medicine   X Other: MDS (myelodysplastic syndrome)    - Diagnosed recently at OSH via bone marrow biopsy. Report found in chart. 7% myeloblasts noted Progress note 3/18 Hospital medicine     Provider, please specify diagnosis or diagnoses associated with above clinical findings.    [  ] Pancytopenia with aplastic anemia  [  ] Pancytopenia due to chemotherapy  [  ] Pancytopenia due to other drug  [ X ] Pancytopenia due to myelodysplastic syndrome  [  ] Pancytopenia  [  ] Other Hematological Diagnosis (please specify) ______________________________  [  ] Clinically Undetermined      Please document in your progress notes daily for the duration of treatment, until resolved, and include in your discharge summary.

## 2018-04-05 NOTE — PROGRESS NOTES
Pt given discharge instructions/handouts. Questions answered. PIV dc'd. Pt given all personal items. Pt and daughter verbalize understanding. No acute events. Pt awaiting transport.

## 2018-04-06 NOTE — NURSING
1330: resumed care of pt with active bleeding from newly placed rcw port.  Pressure dsg in place.  Will receive 1 unit plt and 2 units prbc's.    1500:  Received plts without difficulty.  Pressure drsg changed per BROOKLYNN Kaufman.  Active bleeding still present though decreased. 2x2 gauzes that were removed were fully saturated.

## 2018-04-06 NOTE — PROGRESS NOTES
Discussed ongoing oozing at the port site with some pinkness in the bandage over the port site. No swelling or bright red bleeding noted. In this patient with MDS and a platelet count of 4 who received 2 units of platelets prior to the procedure, my recommendation was to keep the dressing in place. Without being able to evaluate the patient in person I stated it is unclear what the next best steps are, but watchful waiting vs ED/urgent care visit would both be a reasonable plans. The patient reported feeling well.     Discuss had at ~9PM

## 2018-04-06 NOTE — NURSING
Pt came in for labs from new port (yesterday).  Port incision has continued to bleed thru out the night.  New pressure dressings applied.  Labs drawn from right hand and sent.  Pt now going to chair, Sabrina charge nurse handling pressure dressings.     1325:  Received critical lab values, notified Dr. Hopkins new orders in for blood and platelets.

## 2018-04-07 PROBLEM — R58 BLEEDING: Status: RESOLVED | Noted: 2018-01-01 | Resolved: 2018-01-01

## 2018-04-07 PROBLEM — J44.9 COPD (CHRONIC OBSTRUCTIVE PULMONARY DISEASE): Status: ACTIVE | Noted: 2018-01-01

## 2018-04-07 PROBLEM — D69.9 BLEEDING DISORDER: Status: ACTIVE | Noted: 2018-01-01

## 2018-04-07 PROBLEM — R58 BLEEDING: Status: ACTIVE | Noted: 2018-01-01

## 2018-04-07 NOTE — HPI
This is a 62 year old female with hx of MDS diagnosed 3/08/18 with 7% blasts on BMBx, HTN, smoking who presents to the ED due to refractory blood oozing from her Port-A-cath. Patient had an elective port-A-cath placement on 4/5/2018 prior to starting chemotherapy for MDS due on Monday 4/9/2018. Patient noted blood oozing and soaking of site of port-A-cath shortly after placement. Patient presented yesterday to infusion center yesterday morning for constant bleeding and soaking of dressing and received 2 units of blood and one unit of platelets. Her bleeding persisted beyond 4-5 dressing changes and receiving blood products but still went home. Last evening, her daughter, who is a physician, changed patient's dressings and applied surgicel but the bleeding persisted despite that and they presented to the ED this morning. Patient reports DIMAS and lightheadedness on exertion. Patient reports bleeding of her gums and skin with diffuse bruising. However, patient denies any hematemesis, melena, or hematuria. Patient reports centrally localized crushing chest pain that improves with drinking but denies any vomiting or diaphoresis.

## 2018-04-07 NOTE — ASSESSMENT & PLAN NOTE
Likely due to thrombocytopenia. Continue Amicar 2g [id number] and tranexamic acid 1g daily.  Dressing changes and platelet transfusion.

## 2018-04-07 NOTE — ASSESSMENT & PLAN NOTE
Likely due to MDS. plt 6 in ED. Received one unit of plt in the ED. Repeat CBC and transfuse for >10k or until achieving hemostasis.

## 2018-04-07 NOTE — ED NOTES
Hourly rounding complete. Patient resting in stretcher and is in NAD at this time. Pt is awake alert and oriented x4, VSS, respirations even and unlabored. Family at bedside. Pt and family updated on POC. Bed low and locked with side rails up x2, call bell in pt reach.

## 2018-04-07 NOTE — ED NOTES
Dressing with quick clot remains in place, saturated with blood. Dressing removed and direct pressure applied to x2 bleeding sites noted to right upper chest for 10 minutes per Dr Arias verbal order with readback, x1 site bleeding resolved, x1 site continues to ooze blood, new quick clot and dressing applied to site.

## 2018-04-07 NOTE — SUBJECTIVE & OBJECTIVE
Patient information was obtained from patient, relative(s), past medical records and ER records.     Oncology History from Dr. Hopkins's last office visit:   --Began noticing bruising around January 28th, 2018 on her extremities and  presented to the PCP on February 12th for evaluation with blood work.  The patient states she was found to have platelets in the 25k range.    --Bone marrow biopsy on 3/08/18 showed MDS with 7% blasts in the BM marrow. --On first lab check at [facility] she was noted to have anemia with hemoglobin of  ~5 and plt count of 7k.  --Admitted at [facility] from 3/16/18-3/22/18 for refractory pancytopenia. 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 12 % Blasts. 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      (Not in a hospital admission)    Cephalosporins; Penicillins; and Sulfa (sulfonamide antibiotics)     Past Medical History:   Diagnosis Date    Essential hypertension 3/17/2018    Mild asthma 3/17/2018    Smoker      Past Surgical History:   Procedure Laterality Date    PORTACATH PLACEMENT      TUBAL LIGATION       Family History     None        Social History Main Topics    Smoking status: Former Smoker     Packs/day: 1.50     Years: 40.00     Types: Cigarettes     Quit date: 3/23/2018    Smokeless tobacco: Never Used    Alcohol use No    Drug use: No    Sexual activity: Not Currently       Review of Systems   Constitutional: no fever or chills  ENT: no nasal congestion or sore throat  Respiratory: no cough + DIMAS   Cardiovascular: + chest pain, no palpitations  Gastrointestinal: + nausea but no vomiting, no abdominal pain or abdominal distention   Genitourinary: no hematuria or dysuria  Integument/Breast: no rash or  pruritis  Hematologic/Lymphatic: + easy bruising no lymphadenopathy  Musculoskeletal: no arthralgias or myalgias  Neurological: no seizures or tremors  Endocrine: no heat or cold intolerance    Objective:     Vital Signs (Most Recent):  Temp: 99 °F (37.2 °C) (04/07/18 0830)  Pulse: 88 (04/07/18 1100)  Resp: 16 (04/07/18 1100)  BP: (!) 142/79 (04/07/18 1100)  SpO2: 95 % (04/07/18 1100) Vital Signs (24h Range):  Temp:  [97.8 °F (36.6 °C)-99.4 °F (37.4 °C)] 99 °F (37.2 °C)  Pulse:  [77-97] 88  Resp:  [16-22] 16  SpO2:  [94 %-98 %] 95 %  BP: (109-177)/(56-96) 142/79     Weight: 76.7 kg (169 lb)  Body mass index is 29.01 kg/m².  Body surface area is 1.86 meters squared.        Lines/Drains/Airways     Central Venous Catheter Line                 Port A Cath Single Lumen 04/05/18 1057  2 days          Peripheral Intravenous Line                 Peripheral IV - Single Lumen 04/07/18 0714 Left Antecubital less than 1 day                Physical Exam  General: well developed, well nourished, appears to be in NAD.   Eyes: conjunctivae/corneas clear. PERRL. EOMI.  Neck: supple, symmetrical, trachea midline, no JVD.  Cardiovascular: Heart: regular rate and rhythm, S1, S2 normal, no murmur, click, rub or gallop.  Lungs: clear to auscultation bilaterally and normal respiratory effort.   Chest Wall: tenderness and active oozing overlying port-A cath.   Abdomen/Rectal: Abdomen: Non distended. + BS. No masses. No TTP.   Rectal: not examined  Extremities: no edema, no redness or tenderness in the calves or thighs. Pulses: 2+ and symmetric  Skin: No rashes or ulcers. + dispersed petechiae and ecchymosis. Patient has hematoma over her right upper chest around the site of her port-A cath.  Musculoskeletal: full range of motion of joints  Lymph Nodes: No cervical or supraclavicular adenopathy  Psych/Behavioral: Normal. Alert and oriented, appropriate affect.    Significant Labs:   Recent Results (from the past 24 hour(s))   Type & Screen     Collection Time: 04/06/18 12:23 PM   Result Value Ref Range    Group & Rh A POS     Indirect [place] POS    Prepare RBC 2 Units; severe MDS    Collection Time: 04/06/18  1:27 PM   Result Value Ref Range    UNIT NUMBER [id number]     PRODUCT CODE [id number]     DISPENSE STATUS TRANSFUSED     CODING SYSTEM [id number]     Unit Blood Type Code 6200     Unit Blood Type A POS     Unit Expiration [id number]     UNIT NUMBER [id number]     PRODUCT CODE J4814T16     DISPENSE STATUS TRANSFUSED     CODING SYSTEM QYGZ944     Unit Blood Type Code 0600     Unit Blood Type A NEG     Unit Expiration [id number]    Prepare Platelets 1 Dose    Collection Time: 04/06/18  1:27 PM   Result Value Ref Range    UNIT NUMBER [id number]     PRODUCT CODE U2057C92     DISPENSE STATUS TRANSFUSED     CODING SYSTEM OUVA720     Unit Blood Type Code 6200     Unit Blood Type A POS     Unit Expiration [id number]    Prepare Platelets 1 Dose    Collection Time: 04/07/18  7:01 AM   Result Value Ref Range    UNIT NUMBER [id number]     PRODUCT CODE A6504Q76     DISPENSE STATUS ISSUED     CODING SYSTEM KUZL664     Unit Blood Type Code 6200     Unit Blood Type A POS     Unit Expiration [id number]    CBC auto differential    Collection Time: 04/07/18  7:14 AM   Result Value Ref Range    WBC 3.66 (L) 3.90 - 12.70 K/uL    RBC 2.55 (L) 4.00 - 5.40 M/uL    Hemoglobin 7.9 (L) 12.0 - 16.0 g/dL    Hematocrit 22.0 (L) 37.0 - 48.5 %    MCV 86 82 - 98 fL    MCH 31.0 27.0 - 31.0 pg    MCHC 35.9 32.0 - 36.0 g/dL    RDW 13.5 11.5 - 14.5 %    Platelets 5 (LL) 150 - 350 K/uL    MPV SEE COMMENT 9.2 - 12.9 fL    Immature Granulocytes CANCELED 0.0 - 0.5 %    Immature Grans (Abs) CANCELED 0.00 - 0.04 K/uL    Lymph # CANCELED 1.0 - 4.8 K/uL    Mono # CANCELED 0.3 - 1.0 K/uL    Eos # CANCELED 0.0 - 0.5 K/uL    Baso # CANCELED 0.00 - 0.20 K/uL    nRBC 0 0 /100 WBC    Gran% 46.0 38.0 - 73.0 %    Lymph% 32.0 18.0 - 48.0 %    Mono% 12.0 4.0 - 15.0 %    Eosinophil% 0.0  0.0 - 8.0 %    Basophil% 0.0 0.0 - 1.9 %    Bands 3.0 %    Myelocytes 2.0 %    Promyelocytes 1.0 %    Blasts 4.0 (A) 0 %    Aniso Slight     Poik Slight     Poly Occasional     Hypo Occasional     Ovalocytes Occasional     Differential Method Manual    Comprehensive metabolic panel    Collection Time: 04/07/18  7:14 AM   Result Value Ref Range    Sodium 136 136 - 145 mmol/L    Potassium 3.3 (L) 3.5 - 5.1 mmol/L    Chloride 98 95 - 110 mmol/L    CO2 26 23 - 29 mmol/L    Glucose 115 (H) 70 - 110 mg/dL    BUN, Bld 16 8 - 23 mg/dL    Creatinine 0.7 0.5 - 1.4 mg/dL    Calcium 8.7 8.7 - 10.5 mg/dL    Total Protein 6.5 6.0 - 8.4 g/dL    Albumin 3.3 (L) 3.5 - 5.2 g/dL    Total Bilirubin 2.4 (H) 0.1 - 1.0 mg/dL    Alkaline Phosphatase 59 55 - 135 U/L    AST 19 10 - 40 U/L    ALT 21 10 - 44 U/L    Anion Gap 12 8 - 16 mmol/L    eGFR if African American >60.0 >60 mL/min/1.73 m^2    eGFR if non African American >60.0 >60 mL/min/1.73 m^2   Protime-INR    Collection Time: 04/07/18  7:14 AM   Result Value Ref Range    Prothrombin Time 11.6 9.0 - 12.5 sec    INR 1.1 0.8 - 1.2   APTT    Collection Time: 04/07/18  7:14 AM   Result Value Ref Range    aPTT 26.2 21.0 - 32.0 sec   CBC auto differential    Collection Time: 04/07/18 11:06 AM   Result Value Ref Range    WBC 3.09 (L) 3.90 - 12.70 K/uL    RBC 2.17 (L) 4.00 - 5.40 M/uL    Hemoglobin 6.5 (L) 12.0 - 16.0 g/dL    Hematocrit 18.8 (LL) 37.0 - 48.5 %    MCV 87 82 - 98 fL    MCH 30.0 27.0 - 31.0 pg    MCHC 34.6 32.0 - 36.0 g/dL    RDW 13.3 11.5 - 14.5 %    MPV SEE COMMENT 9.2 - 12.9 fL       Diagnostic Results:  Imaging Results          X-Ray Chest 1 View (Final result)  Result time 04/07/18 07:50:28    Final result by Belle Donaldson MD (04/07/18 07:50:28)                 Impression:      No detrimental change since March 17, 2018      Electronically signed by: Belle Donaldson MD  Date:    04/07/2018  Time:    07:50             Narrative:    EXAMINATION:  XR CHEST 1  VIEW    CLINICAL HISTORY:  . Presence of other vascular implants and grafts    TECHNIQUE:  Single frontal portable view of the chest was performed.    COMPARISON:  March 17, 2018    FINDINGS:  Support devices: Indwelling right-sided central venous catheter has its tip overlying the expected location of the superior vena cava    The lungs are clear and similar to March 17, 2018, with normal appearance of pulmonary vasculature and no large pleural effusion or pneumothorax.    The cardiac silhouette is normal in size. The hilar and mediastinal contours are unremarkable.  There are atherosclerotic calcifications of the thoracic aorta    Bones are stable.

## 2018-04-07 NOTE — ED NOTES
Dressing noted to pt left forearm on arrival, pt and family states placed yesterday after blood draw and requests to remove dressing, dressing removed, blood oozing from blood draw site, new dressing applied. MD informed.

## 2018-04-07 NOTE — ED NOTES
Hourly rounding complete. Patient resting in stretcher and is in NAD at this time. Pt is awake alert and oriented x4, VSS, respirations even and unlabored. Family at bedside. Pt and family updated on POC. Bed low and locked with side rails up x2, call bell in pt reach. Pt assisted with bedpan use, urinated, cleaned, voices no other needs at this time.

## 2018-04-07 NOTE — ED NOTES
Quick clot and dressing applied to right upper chest bleeding sites per Dr Arias verbal order with readback.

## 2018-04-07 NOTE — ED TRIAGE NOTES
Pt arrived to ED with CC of bleeding from port a cath site, reports port placed x2 days ago, bleeding increased onset yesterday, family reports dressing changed x4 times throughout night. Pt arrived with dressing saturated with blood, dressing changed, x2 linear surgical wounds noted, blood oozing from x2 sites beneath dressing. Pt denies CP fever or nausea, reports SOB.    Patient identifiers verified and correct for Brooke Olivas.    LOC: The patient is awake, alert and oriented x 4. Pt is speaking appropriately, no slurred speech.  SKIN: Scattered bruising noted throughout right chest.  MUSCULOSKELETAL: Full range of motion present in all extremities. Hand  equal and leg strength strong +5 bilaterally.  RESPIRATORY: Airway is open and patent. Respirations-unlabored, regular rate, equal bilaterally on inspiration and expiration. No accessory muscle use noted. Lungs clear to auscultation in all fields bilaterally anterior and posterior.   CARDIAC: Patient has regular heart rate. No peripheral edema noted, and patient has no c/o chest pain.  ABDOMEN: Soft and non-tender to palpation with no distention noted.  NEUROLOGIC: Eyes open spontaneously and facial expression symmetrical. Pt behavior appropriate to situation, and pt follows commands.  Pt reports sensation present in all extremities when touched with a finger. PERRLA

## 2018-04-07 NOTE — ED NOTES
Pt assisted with bedpan use for urination, pt cleaned.     Dressing to pt right upper chest saturated with blood, removed, wound cleaned, active oozing of blood continues at this time, Hem Onc MD at bedside and aware, new dressing applied with aquacel placed on site per Hem Onc MD verbal order with read back.

## 2018-04-07 NOTE — ED NOTES
"Pt c/o itching to left hand, states "They sometimes give me benadryl with the transfusions", Dr Arias notified, verbal order with read back received and placed.  "

## 2018-04-07 NOTE — ASSESSMENT & PLAN NOTE
Diagnosed 3/8/2018 with BMBx showing blasts of 7% on BMBx.  Follows with Dr. Hopkins in clinic. Due to start chemo this Monday in the infusion center.

## 2018-04-07 NOTE — ASSESSMENT & PLAN NOTE
Likely due to MDS in acute setting of bleeding from port-A-cath site due to thrombocytopenia.   Received two units of pRBC yesterday in the infusion unit. Hgb 6.5 on repeat CBC in ED. Will proceed with one unit of pRBC for now.  Follow daily CBC and transfuse for goal hgb >7g/dL.

## 2018-04-07 NOTE — PLAN OF CARE
Problem: Patient Care Overview  Goal: Plan of Care Review  Outcome: Ongoing (interventions implemented as appropriate)  Report received from JULIETH Ibrahim. Patient with bright red bleeding oozing to port study. Currently on 2nd unit of prbcs. RN states she has changed pressure dressing about 4x's today. Advised patient to go to ER since bleeding has not resolved and now oozing onto her shirt. Patient states daughter would like for her to go home at this time. PIV removed per patient's request since not going to ER as advised. Patient requested another dressing to be applied below current pressure dressing. Dressing applied. Daughter now at chairside. Both Felicia, charge nurse and this RN reiterated importance of mother going to er. Daughter states will go to the [facility] ER as her other daughter works there. Discharged patient home, escorted in wheelchair by daughter.

## 2018-04-07 NOTE — H&P
[facility]  Hematology  Bone Marrow Transplant  H&P    Subjective:     Principal Problem: Bleeding disorder    HPI: This is a 62 year old female with hx of MDS diagnosed 3/08/18 with 7% blasts on BMBx, HTN, smoking who presents to the ED due to refractory blood oozing from her Port-A-cath. Patient had an elective port-A-cath placement on 4/5/2018 prior to starting chemotherapy for MDS due on Monday 4/9/2018. Patient noted blood oozing and soaking of site of port-A-cath shortly after placement. Patient presented yesterday to infusion center yesterday morning for constant bleeding and soaking of dressing and received 2 units of blood and one unit of platelets. Her bleeding persisted beyond 4-5 dressing changes and receiving blood products but still went home. Last evening, her daughter, who is a physician, changed patient's dressings and applied surgicel but the bleeding persisted despite that and they presented to the ED this morning. Patient reports DIMAS and lightheadedness on exertion. Patient reports bleeding of her gums and skin with diffuse bruising. However, patient denies any hematemesis, melena, or hematuria. Patient reports centrally localized crushing chest pain that improves with drinking but denies any vomiting or diaphoresis.     Patient information was obtained from patient, relative(s), past medical records and ER records.     Oncology History from Dr. Hopkins's last office visit:   --Began noticing bruising around January 28th, 2018 on her extremities and  presented to the PCP on February 12th for evaluation with blood work.  The patient states she was found to have platelets in the 25k range.    --Bone marrow biopsy on 3/08/18 showed MDS with 7% blasts in the BM marrow. --On first lab check at [facility] she was noted to have anemia with hemoglobin of  ~5 and plt count of 7k.  --Admitted at [facility] from 3/16/18-3/22/18 for refractory pancytopenia. 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 12 % Blasts. 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 In addition, three copies of Q2X186 (at 7q31) was observed in 56% of nuclei.      (Not in a hospital admission)    Cephalosporins; Penicillins; and Sulfa (sulfonamide antibiotics)     Past Medical History:   Diagnosis Date    Essential hypertension 3/17/2018    Mild asthma 3/17/2018    Smoker      Past Surgical History:   Procedure Laterality Date    PORTACATH PLACEMENT      TUBAL LIGATION       Family History     None        Social History Main Topics    Smoking status: Former Smoker     Packs/day: 1.50     Years: 40.00     Types: Cigarettes     Quit date: 3/23/2018    Smokeless tobacco: Never Used    Alcohol use No    Drug use: No    Sexual activity: Not Currently       Review of Systems   Constitutional: no fever or chills  ENT: no nasal congestion or sore throat  Respiratory: no cough + DIMAS   Cardiovascular: + chest pain, no palpitations  Gastrointestinal: + nausea but no vomiting, no abdominal pain or abdominal distention   Genitourinary: no hematuria or dysuria  Integument/Breast: no rash or pruritis  Hematologic/Lymphatic: + easy bruising no lymphadenopathy  Musculoskeletal: no arthralgias or myalgias  Neurological: no seizures or tremors  Endocrine: no heat or cold intolerance    Objective:     Vital Signs (Most Recent):  Temp: 99 °F (37.2 °C) (04/07/18 0830)  Pulse: 88 (04/07/18 1100)  Resp: 16 (04/07/18 1100)  BP: (!) 142/79 (04/07/18 1100)  SpO2: 95 % (04/07/18 1100) Vital Signs (24h Range):  Temp:  [97.8 °F (36.6 °C)-99.4 °F (37.4 °C)] 99 °F (37.2 °C)  Pulse:  [77-97] 88  Resp:  [16-22] 16  SpO2:  [94 %-98 %] 95 %  BP: (109-177)/(56-96) 142/79     Weight: 76.7 kg (169 lb)  Body mass index is 29.01 kg/m².  Body surface area is 1.86 meters  squared.        Lines/Drains/Airways     Central Venous Catheter Line                 Port A Cath Single Lumen 04/05/18 1057  2 days          Peripheral Intravenous Line                 Peripheral IV - Single Lumen 04/07/18 0714 Left Antecubital less than 1 day                Physical Exam  General: well developed, well nourished, appears to be in NAD.   Eyes: conjunctivae/corneas clear. PERRL. EOMI.  Neck: supple, symmetrical, trachea midline, no JVD.  Cardiovascular: Heart: regular rate and rhythm, S1, S2 normal, no murmur, click, rub or gallop.  Lungs: clear to auscultation bilaterally and normal respiratory effort.   Chest Wall: tenderness and active oozing overlying port-A cath.   Abdomen/Rectal: Abdomen: Non distended. + BS. No masses. No TTP.   Rectal: not examined  Extremities: no edema, no redness or tenderness in the calves or thighs. Pulses: 2+ and symmetric  Skin: No rashes or ulcers. + dispersed petechiae and ecchymosis. Patient has hematoma over her right upper chest around the site of her port-A cath.  Musculoskeletal: full range of motion of joints  Lymph Nodes: No cervical or supraclavicular adenopathy  Psych/Behavioral: Normal. Alert and oriented, appropriate affect.    Significant Labs:   Recent Results (from the past 24 hour(s))   Type & Screen    Collection Time: 04/06/18 12:23 PM   Result Value Ref Range    Group & Rh A POS     Indirect [place] POS    Prepare RBC 2 Units; severe MDS    Collection Time: 04/06/18  1:27 PM   Result Value Ref Range    UNIT NUMBER [id number]     PRODUCT CODE D9310D67     DISPENSE STATUS TRANSFUSED     CODING SYSTEM [id number]     Unit Blood Type Code 6200     Unit Blood Type A POS     Unit Expiration [id number]     UNIT NUMBER [id number]     PRODUCT CODE S9134L27     DISPENSE STATUS TRANSFUSED     CODING SYSTEM OSDH263     Unit Blood Type Code 0600     Unit Blood Type A NEG     Unit Expiration [id number]    Prepare Platelets 1 Dose    Collection Time: 04/06/18   1:27 PM   Result Value Ref Range    UNIT NUMBER [id number]     PRODUCT CODE Q7648Q22     DISPENSE STATUS TRANSFUSED     CODING SYSTEM WJDI249     Unit Blood Type Code 6200     Unit Blood Type A POS     Unit Expiration [id number]    Prepare Platelets 1 Dose    Collection Time: 04/07/18  7:01 AM   Result Value Ref Range    UNIT NUMBER [id number]     PRODUCT CODE O1040J96     DISPENSE STATUS ISSUED     CODING SYSTEM AHRX819     Unit Blood Type Code 6200     Unit Blood Type A POS     Unit Expiration [id number]    CBC auto differential    Collection Time: 04/07/18  7:14 AM   Result Value Ref Range    WBC 3.66 (L) 3.90 - 12.70 K/uL    RBC 2.55 (L) 4.00 - 5.40 M/uL    Hemoglobin 7.9 (L) 12.0 - 16.0 g/dL    Hematocrit 22.0 (L) 37.0 - 48.5 %    MCV 86 82 - 98 fL    MCH 31.0 27.0 - 31.0 pg    MCHC 35.9 32.0 - 36.0 g/dL    RDW 13.5 11.5 - 14.5 %    Platelets 5 (LL) 150 - 350 K/uL    MPV SEE COMMENT 9.2 - 12.9 fL    Immature Granulocytes CANCELED 0.0 - 0.5 %    Immature Grans (Abs) CANCELED 0.00 - 0.04 K/uL    Lymph # CANCELED 1.0 - 4.8 K/uL    Mono # CANCELED 0.3 - 1.0 K/uL    Eos # CANCELED 0.0 - 0.5 K/uL    Baso # CANCELED 0.00 - 0.20 K/uL    nRBC 0 0 /100 WBC    Gran% 46.0 38.0 - 73.0 %    Lymph% 32.0 18.0 - 48.0 %    Mono% 12.0 4.0 - 15.0 %    Eosinophil% 0.0 0.0 - 8.0 %    Basophil% 0.0 0.0 - 1.9 %    Bands 3.0 %    Myelocytes 2.0 %    Promyelocytes 1.0 %    Blasts 4.0 (A) 0 %    Aniso Slight     Poik Slight     Poly Occasional     Hypo Occasional     Ovalocytes Occasional     Differential Method Manual    Comprehensive metabolic panel    Collection Time: 04/07/18  7:14 AM   Result Value Ref Range    Sodium 136 136 - 145 mmol/L    Potassium 3.3 (L) 3.5 - 5.1 mmol/L    Chloride 98 95 - 110 mmol/L    CO2 26 23 - 29 mmol/L    Glucose 115 (H) 70 - 110 mg/dL    BUN, Bld 16 8 - 23 mg/dL    Creatinine 0.7 0.5 - 1.4 mg/dL    Calcium 8.7 8.7 - 10.5 mg/dL    Total Protein 6.5 6.0 - 8.4 g/dL    Albumin 3.3 (L) 3.5 - 5.2  g/dL    Total Bilirubin 2.4 (H) 0.1 - 1.0 mg/dL    Alkaline Phosphatase 59 55 - 135 U/L    AST 19 10 - 40 U/L    ALT 21 10 - 44 U/L    Anion Gap 12 8 - 16 mmol/L    eGFR if African American >60.0 >60 mL/min/1.73 m^2    eGFR if non African American >60.0 >60 mL/min/1.73 m^2   Protime-INR    Collection Time: 04/07/18  7:14 AM   Result Value Ref Range    Prothrombin Time 11.6 9.0 - 12.5 sec    INR 1.1 0.8 - 1.2   APTT    Collection Time: 04/07/18  7:14 AM   Result Value Ref Range    aPTT 26.2 21.0 - 32.0 sec   CBC auto differential    Collection Time: 04/07/18 11:06 AM   Result Value Ref Range    WBC 3.09 (L) 3.90 - 12.70 K/uL    RBC 2.17 (L) 4.00 - 5.40 M/uL    Hemoglobin 6.5 (L) 12.0 - 16.0 g/dL    Hematocrit 18.8 (LL) 37.0 - 48.5 %    MCV 87 82 - 98 fL    MCH 30.0 27.0 - 31.0 pg    MCHC 34.6 32.0 - 36.0 g/dL    RDW 13.3 11.5 - 14.5 %    MPV SEE COMMENT 9.2 - 12.9 fL       Diagnostic Results:  Imaging Results          X-Ray Chest 1 View (Final result)  Result time 04/07/18 07:50:28    Final result by Belle Donaldson MD (04/07/18 07:50:28)                 Impression:      No detrimental change since March 17, 2018      Electronically signed by: Belle Donaldson MD  Date:    04/07/2018  Time:    07:50             Narrative:    EXAMINATION:  XR CHEST 1 VIEW    CLINICAL HISTORY:  . Presence of other vascular implants and grafts    TECHNIQUE:  Single frontal portable view of the chest was performed.    COMPARISON:  March 17, 2018    FINDINGS:  Support devices: Indwelling right-sided central venous catheter has its tip overlying the expected location of the superior vena cava    The lungs are clear and similar to March 17, 2018, with normal appearance of pulmonary vasculature and no large pleural effusion or pneumothorax.    The cardiac silhouette is normal in size. The hilar and mediastinal contours are unremarkable.  There are atherosclerotic calcifications of the thoracic aorta    Bones are stable.                                   Assessment/Plan:     * Bleeding disorder    Likely due to thrombocytopenia. Continue Amicar 2g [id number] and tranexamic acid 1g daily.  Dressing changes and platelet transfusion.           COPD (chronic obstructive pulmonary disease)    Prn albuterol.  Quit smoking 3 weeks ago. Has smoked for 40 years, >1ppd.        MDS (myelodysplastic syndrome)    Diagnosed 3/8/2018 with BMBx showing blasts of 7% on BMBx.  Follows with Dr. Hopkins in clinic. Due to start chemo this Monday in the infusion center.         Symptomatic anemia    Likely due to MDS in acute setting of bleeding from port-A-cath site due to thrombocytopenia.   Received two units of pRBC yesterday in the infusion unit. Hgb 6.5 on repeat CBC in ED. Will proceed with one unit of pRBC for now.  Follow daily CBC and transfuse for goal hgb >7g/dL.        Thrombocytopenia, unspecified    Likely due to MDS. plt 6 in ED. Received one unit of plt in the ED. Repeat CBC and transfuse for >10k or until achieving hemostasis.         Mild intermittent asthma without complication    Prn albuterol        Essential hypertension    Not currently taking anti-HTN at home.   BP controlled in ED.             VTE Risk Mitigation         Ordered     Place sequential compression device  Until discontinued      04/07/18 1138     Place sequential compression device  Until discontinued      04/07/18 1138     IP VTE HIGH RISK PATIENT  Once      04/07/18 1138          Disposition: admit to BMT service.    Code: full  Diet: regular  dvt ppx: mechanical with SCD.      Brianne Garcia MD  Bone Marrow Transplant  Hematology  [facility]

## 2018-04-07 NOTE — ED NOTES
Hourly rounding complete. Patient resting in stretcher and is in NAD at this time. Pt is awake alert and oriented x4, VSS, respirations even and unlabored. Family at bedside. Pt and family aware of POC. Bed low and locked with side rails up x2, call bell in pt reach. Pt voices no needs at this time.

## 2018-04-07 NOTE — ED NOTES
Hourly rounding complete. Patient resting in stretcher and is in NAD at this time. Pt is awake alert and oriented x4, VSS, respirations even and unlabored. Family at bedside. Pt and family updated on POC. Bed low and locked with side rails up x2, call bell in pt reach. Pt requests ice chips, Dr Arias states okay for pt to have ice chips, ice chips provided, pt voices no other needs at this time.

## 2018-04-07 NOTE — ED PROVIDER NOTES
Encounter Date: 4/7/2018    SCRIBE #1 NOTE: I, Alvin Cochran, am scribing for, and in the presence of,  Dr. Arias. I have scribed the entire note.       History     Chief Complaint   Patient presents with    Vascular Access Problem     port placed thursday to right chest wall, low plt, 2 units of PRBC and 1 unit of plt yesterday. reports she has been bleeding from her incision site all night. hx of MYELODYSPLASTIC SYNDROME      Time patient was seen by the provider: 6:54 AM    The patient is a 62 y.o. female with co-morbidities including: Stroke, HTN, and myelodysplastic syndrome who presents to the ED with a complaint of a bleeding port that was placed 2 days ago and is located in her right chest wall. Pt states the bleeding started late last night and earlier this morning (oozing started yesterday early morning). Associated symptoms include: mild fatigue due to the pt's inability to sleep because of bleeding. Pt's daughter states they were able to treat the wound at home, but were unable to fully get the bleeding to stop permanently. Denies all other symptoms at this time.       The history is provided by the patient, medical records and a relative.     Review of patient's allergies indicates:   Allergen Reactions    Cephalosporins Hives    Penicillins     Sulfa (sulfonamide antibiotics)      Past Medical History:   Diagnosis Date    Essential hypertension 3/17/2018    Mild asthma 3/17/2018    Smoker      Past Surgical History:   Procedure Laterality Date    PORTACATH PLACEMENT      TUBAL LIGATION       History reviewed. No pertinent family history.  Social History   Substance Use Topics    Smoking status: Former Smoker     Packs/day: 1.50     Years: 40.00     Types: Cigarettes     Quit date: 3/23/2018    Smokeless tobacco: Never Used    Alcohol use No     Review of Systems   Constitutional: Positive for fatigue (mild). Negative for fever.   HENT: Negative for sore throat.    Respiratory: Negative for  shortness of breath.         Bleeding to the right chest wall due to port.   Cardiovascular: Negative for chest pain.   Gastrointestinal: Negative for nausea.   Genitourinary: Negative for dysuria.   Musculoskeletal: Negative for back pain.   Skin: Negative for rash.   Neurological: Negative for weakness.   Hematological: Does not bruise/bleed easily.       Physical Exam     Initial Vitals [04/07/18 0640]   BP Pulse Resp Temp SpO2   118/76 96 (!) 22 99.2 °F (37.3 °C) (!) 94 %      MAP       90         Physical Exam    Nursing note and vitals reviewed.  Constitutional:   Pt is comfortable and in no acute distress.    HENT:   Head: Normocephalic and atraumatic.   Moist mucous membranes.    Eyes: Pupils are equal, round, and reactive to light.   Conjunctival pallor.    Neck: Neck supple.   Cardiovascular: Normal rate and regular rhythm.   Pulmonary/Chest: No respiratory distress.   Right chest wall at the incision site from her portacath. She has constant oozing of blood that does not resolve with pressure. Oozing of blood from tiny puncture site superior to the port incision. She has diffuse ecchymosis extending from her neck to the chest wall to the axillary region. Tissue is soft with no induration.    Abdominal: Soft. She exhibits no distension. There is no tenderness.   Musculoskeletal: Normal range of motion. She exhibits no edema.   Neurological: She is alert and oriented to person, place, and time. She has normal strength.   Skin: Skin is warm. There is pallor.         ED Course   Procedures  Labs Reviewed   CBC W/ AUTO DIFFERENTIAL - Abnormal; Notable for the following:        Result Value    WBC 3.66 (*)     RBC 2.55 (*)     Hemoglobin 7.9 (*)     Hematocrit 22.0 (*)     Platelets 5 (*)     Blasts 4.0 (*)     All other components within normal limits    Narrative:       plt critical result(s) called and verbal readback obtained from pennie lopeznurse, 04/07/2018 08:18   COMPREHENSIVE METABOLIC PANEL - Abnormal;  Notable for the following:     Potassium 3.3 (*)     Glucose 115 (*)     Albumin 3.3 (*)     Total Bilirubin 2.4 (*)     All other components within normal limits   CBC W/ AUTO DIFFERENTIAL - Abnormal; Notable for the following:     WBC 3.09 (*)     RBC 2.17 (*)     Hemoglobin 6.5 (*)     Hematocrit 18.8 (*)     Platelets 3 (*)     Blasts 4.0 (*)     All other components within normal limits    Narrative:       PLT critical result(s) called and verbal readback obtained from   NURSE SAMEERA, 04/07/2018 12:10  Call 3 times but on hold. Last call they hungup. Nurse called at   11:42. , 04/07/2018 11:42    HCT  critical result(s) called and verbal readback obtained from   Nurse Sameera, 04/07/2018 11:28   APTT   PROTIME-INR   PROTIME-INR    Narrative:     add on PT Order #[id number], APTT Order #[id number] per Dr Arias @    04/07/2018  08:47    APTT    Narrative:     add on PT Order #[id number], APTT Order #[id number] per Dr Arias @    04/07/2018  08:47    TYPE & SCREEN   INDIRECT ANTIGLOBULIN TEST   PREPARE PLATELETS (DOSE) SOFT          X-Rays:   Independently Interpreted Readings:   Chest X-Ray: No infiltrate or effusion.      Medical Decision Making:   History:   Old Medical Records: I decided to obtain old medical records.  Old Records Summarized: other records.       <> Summary of Records: Chart review: PT with a hx of myelodysplastic syndrome. Followed  by Hem/Onc here. Her most recent lab work was yesterday. She had an H&H of 6.5/19.0 and platelets of 6. Received 2 unites of prbc's and 1 unit of platelets yesterday and had a port placed by RNR Thursday.  Initial Assessment:   61 yo f, h/o MDS, recent hx as above, here with persistent oozing from port-a-cath incision site and smaller puncture wounds made during procedure, no improvement with direct pressure and surgicell at home.  On exam, well-appearing though pale.  Large ecchymotic region around R port-a-cath incision site with mild,  persistent oozing of blood  Differential Diagnosis:   Post-operative bleeding 2/2 severe thrombocytopenia  Independently Interpreted Test(s):   I have ordered and independently interpreted X-rays - see prior notes.  Clinical Tests:   Lab Tests: Ordered and Reviewed  Radiological Study: Ordered and Reviewed  ED Management:  -platelet transfusion  -quick clot  -heme/onc consulted  Other:   I have discussed this case with another health care provider.            Scribe Attestation:   Scribe #1: I performed the above scribed service and the documentation accurately describes the services I performed. I attest to the accuracy of the note.    Attending Attestation:             Attending ED Notes:   8:53 AM  Discussed case with onc fellow  S/p platelets, still oozing  Direct pressure being held  onc would like amicar 5 g IV given  They will admit for observation             Clinical Impression:   The primary encounter diagnosis was Bleeding disorder. A diagnosis of Port-a-cath in place was also pertinent to this visit.       I, Dr. Stephanie Arias, personally performed the services described in this documentation. All medical record entries made by the scribe were at my direction and in my presence.  I have reviewed the chart and agree that the record reflects my personal performance and is accurate and complete. Stephanie Arias MD.  8:04 AM 04/08/2018                        Stephanie Arias MD  04/08/18 0626

## 2018-04-08 PROBLEM — R50.9 FEVER: Status: ACTIVE | Noted: 2018-01-01

## 2018-04-08 NOTE — PROGRESS NOTES
Pt received on unit at 1700, ED RN w patient. ABD pad and stack of gauze 4x4's completely saturated with alexander blood on PAC site. Dressing changed and pressure immediately applied. Team notified, IR consulted. Vitals stable. 1 u platelets given, IV antibiotics given. New pressure dressing with dermabond placed by IR- completely saturated. Dr. Espinoza notifed, IR called. New dressing in place, pressure applied for 15 minutes. No noticeable drainage. Dr. Espinoza notified of edematous R breast. Tramadol given for pain. Call light in place. Will continue to monitor.

## 2018-04-08 NOTE — ASSESSMENT & PLAN NOTE
- likely due to MDS  - plt 6 in ED   - received 3 units of plt yesterday  - received another unit of plt this morning  - will monitor for now as bleeding is now better controlled  - continue amicar

## 2018-04-08 NOTE — NURSING
ROAD TEST:  Oxygen: room air  Ambulates: w assist  Discontinued: PIV removed, catheter tip intact    Tolerating: reg diet  Elimination: voids wo complication  ADLs: performs ind  Teaching: An informational handout was provided to the patient which included instructions that addressed activity level, diet, discharge medications, follow-up appointments, weight monitoring and what to do if symptoms worsen.  Discussed all discharge instructions and medication education with the patient. Patient verbalizes understanding of all information given. Patient states she has no further questions. Dressing over PAC site intact with no drainage. Patient leaving with daughter via wheelchair.

## 2018-04-08 NOTE — SUBJECTIVE & OBJECTIVE
INTERVAL HISTORY:  Patient seen and examined this AM. Patient's bleeding has resolved. She received 3 packs of platelets yesterday along with tranexamic acid x2. She was restarted on her home amicar. In addition, she received 1u pRBC for anemia. IR evaluated patient and applied dermabond dressing x2. Today she reports continued cessation of bleed. She reports discomfort from port but stable. She has back pain from sleeping. Patient denies cardiac chest pains, palpitations, SOB, n/v, abdo pain, constipation/diarrhea, dysuria. No other issues.     Oncology History from Dr. Hopkins's last office visit:   --Began noticing bruising around January 28th, 2018 on her extremities and  presented to the PCP on February 12th for evaluation with blood work.  The patient states she was found to have platelets in the 25k range.    --Bone marrow biopsy on 3/08/18 showed MDS with 7% blasts in the BM marrow. --On first lab check at [facility] she was noted to have anemia with hemoglobin of  ~5 and plt count of 7k.  --Admitted at [facility] from 3/16/18-3/22/18 for refractory pancytopenia. 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 12 % Blasts. 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    Prescriptions Prior to Admission   Medication Sig Dispense Refill Last Dose    acetaminophen (TYLENOL) 325 MG tablet Take 325 mg by mouth 2 (two) times daily.   4/6/2018    aminocaproic acid 1,000 mg Tab Take 2,000 mg by mouth every 8 (eight) hours. 180 each 1 4/6/2018    hydroCHLOROthiazide (MICROZIDE) 12.5 mg capsule Take 12.5 mg by mouth once daily.   Past Month    irbesartan (AVAPRO) 300 MG tablet Take 300 mg by mouth once daily.   Past Month    nebivolol (BYSTOLIC) 5 MG Tab Take 5 mg  by mouth once daily.    Past Month    ondansetron (ZOFRAN-ODT) 4 MG TbDL Take 1 tablet (4 mg total) by mouth every 8 (eight) hours as needed. Take prior to Azacitidine and as needed for nausea/vomiting. 30 tablet 5 4/6/2018    traMADol (ULTRAM) 50 mg tablet Take 1 tablet (50 mg total) by mouth every 6 (six) hours as needed for Pain. 30 tablet 0 4/6/2018    albuterol (VENTOLIN HFA) 90 mcg/actuation inhaler Inhale 1 puff into the lungs every 6 (six) hours as needed for Wheezing or Shortness of Breath. Rescue   More than a month    AMICAR 500 mg Tab           Cephalosporins; Penicillins; and Sulfa (sulfonamide antibiotics)     Past Medical History:   Diagnosis Date    Essential hypertension 3/17/2018    Mild asthma 3/17/2018    Smoker      Past Surgical History:   Procedure Laterality Date    PORTACATH PLACEMENT      TUBAL LIGATION       Family History     None        Social History Main Topics    Smoking status: Former Smoker     Packs/day: 1.50     Years: 40.00     Types: Cigarettes     Quit date: 3/23/2018    Smokeless tobacco: Never Used    Alcohol use No    Drug use: No    Sexual activity: Not Currently       Review of Systems   Constitutional: no fever or chills  ENT: no nasal congestion or sore throat  Respiratory: no cough + DIMAS   Cardiovascular: + chest pain, no palpitations  Gastrointestinal: no nausea/vomiting, no abdominal pain or abdominal distention   Genitourinary: no hematuria or dysuria  Integument/Breast: no rash or pruritis  Hematologic/Lymphatic: + easy bruising no lymphadenopathy  Musculoskeletal: no arthralgias or myalgias  Neurological: no seizures or tremors  Endocrine: no heat or cold intolerance    Objective:     Vital Signs (Most Recent):  Temp: 98.7 °F (37.1 °C) (04/08/18 0743)  Pulse: 82 (04/08/18 0743)  Resp: 18 (04/08/18 0743)  BP: 136/86 (04/08/18 0743)  SpO2: (!) 94 % (04/08/18 0743) Vital Signs (24h Range):  Temp:  [98.5 °F (36.9 °C)-101.5 °F (38.6 °C)] 98.7 °F (37.1  °C)  Pulse:  [77-92] 82  Resp:  [13-21] 18  SpO2:  [94 %-99 %] 94 %  BP: (110-148)/(64-87) 136/86     Weight: 76.7 kg (169 lb)  Body mass index is 29.01 kg/m².  Body surface area is 1.86 meters squared.        Lines/Drains/Airways     Central Venous Catheter Line                 Port A Cath Single Lumen 04/05/18 1057  2 days          Peripheral Intravenous Line                 Peripheral IV - Single Lumen 04/07/18 0714 Left Antecubital 1 day         Peripheral IV - Single Lumen 04/07/18 1610 Left Wrist less than 1 day                Physical Exam  General: well developed, well nourished, appears to be in NAD.   Eyes: conjunctivae/corneas clear. PERRL. EOMI.  Neck: supple, symmetrical, trachea midline, no JVD.  Cardiovascular: Heart: regular rate and rhythm, S1, S2 normal, no murmur, click, rub or gallop.  Lungs: clear to auscultation bilaterally and normal respiratory effort.   Chest Wall: tenderness and active oozing overlying port-A cath.   Abdomen/Rectal: Abdomen: Non distended. + BS. No masses. No TTP.   Rectal: not examined  Extremities: no edema, no redness or tenderness in the calves or thighs. Pulses: 2+ and symmetric  Skin: No rashes or ulcers. + dispersed petechiae and ecchymosis. Patient has hematoma over her right upper chest around the site of her port-A cath. No active bleed  Musculoskeletal: full range of motion of joints  Lymph Nodes: No cervical or supraclavicular adenopathy  Psych/Behavioral: Normal. Alert and oriented, appropriate affect.    Significant Labs:   Recent Results (from the past 24 hour(s))   CBC auto differential    Collection Time: 04/07/18 11:06 AM   Result Value Ref Range    WBC 3.09 (L) 3.90 - 12.70 K/uL    RBC 2.17 (L) 4.00 - 5.40 M/uL    Hemoglobin 6.5 (L) 12.0 - 16.0 g/dL    Hematocrit 18.8 (LL) 37.0 - 48.5 %    MCV 87 82 - 98 fL    MCH 30.0 27.0 - 31.0 pg    MCHC 34.6 32.0 - 36.0 g/dL    RDW 13.3 11.5 - 14.5 %    Platelets 3 (LL) 150 - 350 K/uL    MPV SEE COMMENT 9.2 - 12.9 fL     Immature Granulocytes CANCELED 0.0 - 0.5 %    Immature Grans (Abs) CANCELED 0.00 - 0.04 K/uL    Lymph # Test Not Performed 1.0 - 4.8 K/uL    Mono # Test Not Performed 0.3 - 1.0 K/uL    Eos # Test Not Performed 0.0 - 0.5 K/uL    Baso # Test Not Performed 0.00 - 0.20 K/uL    nRBC 0 0 /100 WBC    Gran% 38.0 38.0 - 73.0 %    Lymph% 38.0 18.0 - 48.0 %    Mono% 12.0 4.0 - 15.0 %    Eosinophil% 0.0 0.0 - 8.0 %    Basophil% 0.0 0.0 - 1.9 %    Bands 4.0 %    Metamyelocytes 2.0 %    Myelocytes 2.0 %    Blasts 4.0 (A) 0 %    Aniso Slight     Poik Slight     Poly Occasional     Hypo Occasional     Ovalocytes Occasional     Differential Method Manual    Prepare RBC 2 Units; difficult match    Collection Time: 04/07/18 11:49 AM   Result Value Ref Range    UNIT NUMBER [id number]     PRODUCT CODE R2211L80     DISPENSE STATUS TRANSFUSED     CODING SYSTEM [id number]     Unit Blood Type Code 0600     Unit Blood Type A NEG     Unit Expiration [id number]     UNIT NUMBER [id number]     PRODUCT CODE T8433U09     DISPENSE STATUS TRANSFUSED     CODING SYSTEM FVCL591     Unit Blood Type Code 6200     Unit Blood Type A POS     Unit Expiration [id number]    Type & Screen    Collection Time: 04/07/18 11:56 AM   Result Value Ref Range    Group & Rh A POS     Indirect [place]ana POS    Indirect Antiglobulin Test    Collection Time: 04/07/18 11:56 AM   Result Value Ref Range    Antibody Screen POS    Prepare Platelets 1 Dose    Collection Time: 04/07/18 11:56 AM   Result Value Ref Range    UNIT NUMBER [id number]     PRODUCT CODE H9139V41     DISPENSE STATUS TRANSFUSED     CODING SYSTEM NYMI838     Unit Blood Type Code 6200     Unit Blood Type A POS     Unit Expiration [id number]    Prepare RBC 1 Unit    Collection Time: 04/07/18 11:56 AM   Result Value Ref Range    UNIT NUMBER [id number]     PRODUCT CODE T4442R35     DISPENSE STATUS CROSSMATCHED     CODING SYSTEM [id number]     Unit Blood Type Code 0600     Unit Blood Type A NEG      Unit Expiration [id number]    Prepare Platelets 1 Dose    Collection Time: 04/07/18 11:56 AM   Result Value Ref Range    UNIT NUMBER [id number]     PRODUCT CODE H5787O99     DISPENSE STATUS TRANSFUSED     CODING SYSTEM BAYV382     Unit Blood Type Code 6200     Unit Blood Type A POS     Unit Expiration [id number]    Blood culture    Collection Time: 04/07/18  4:04 PM   Result Value Ref Range    Blood Culture, Routine No Growth to date    Blood culture    Collection Time: 04/07/18  4:10 PM   Result Value Ref Range    Blood Culture, Routine No Growth to date    CBC auto differential    Collection Time: 04/07/18  9:01 PM   Result Value Ref Range    WBC 4.16 3.90 - 12.70 K/uL    RBC 2.25 (L) 4.00 - 5.40 M/uL    Hemoglobin 6.9 (L) 12.0 - 16.0 g/dL    Hematocrit 19.7 (LL) 37.0 - 48.5 %    MCV 88 82 - 98 fL    MCH 30.7 27.0 - 31.0 pg    MCHC 35.0 32.0 - 36.0 g/dL    RDW 13.6 11.5 - 14.5 %    Platelets 11 (LL) 150 - 350 K/uL    MPV 9.4 9.2 - 12.9 fL    Immature Granulocytes CANCELED 0.0 - 0.5 %    Immature Grans (Abs) CANCELED 0.00 - 0.04 K/uL    Lymph # Test Not Performed 1.0 - 4.8 K/uL    Mono # Test Not Performed 0.3 - 1.0 K/uL    Eos # Test Not Performed 0.0 - 0.5 K/uL    Baso # Test Not Performed 0.00 - 0.20 K/uL    nRBC 0 0 /100 WBC    Gran% 31.0 (L) 38.0 - 73.0 %    Lymph% 43.0 18.0 - 48.0 %    Mono% 1.0 (L) 4.0 - 15.0 %    Eosinophil% 0.0 0.0 - 8.0 %    Basophil% 0.0 0.0 - 1.9 %    Bands 4.0 %    Metamyelocytes 2.0 %    Myelocytes 5.0 %    Blasts 14.0 (A) 0 %    Aniso Slight     Differential Method Manual    Basic metabolic panel    Collection Time: 04/08/18  3:47 AM   Result Value Ref Range    Sodium 138 136 - 145 mmol/L    Potassium 2.9 (L) 3.5 - 5.1 mmol/L    Chloride 100 95 - 110 mmol/L    CO2 27 23 - 29 mmol/L    Glucose 105 70 - 110 mg/dL    BUN, Bld 14 8 - 23 mg/dL    Creatinine 0.6 0.5 - 1.4 mg/dL    Calcium 8.3 (L) 8.7 - 10.5 mg/dL    Anion Gap 11 8 - 16 mmol/L    eGFR if African American >60.0 >60  mL/min/1.73 m^2    eGFR if non African American >60.0 >60 mL/min/1.73 m^2   CBC auto differential    Collection Time: 04/08/18  3:47 AM   Result Value Ref Range    WBC 3.59 (L) 3.90 - 12.70 K/uL    RBC 2.54 (L) 4.00 - 5.40 M/uL    Hemoglobin 7.6 (L) 12.0 - 16.0 g/dL    Hematocrit 22.0 (L) 37.0 - 48.5 %    MCV 87 82 - 98 fL    MCH 29.9 27.0 - 31.0 pg    MCHC 34.5 32.0 - 36.0 g/dL    RDW 13.3 11.5 - 14.5 %    Platelets 7 (LL) 150 - 350 K/uL    MPV 8.6 (L) 9.2 - 12.9 fL    Immature Granulocytes CANCELED 0.0 - 0.5 %    Immature Grans (Abs) CANCELED 0.00 - 0.04 K/uL    Lymph # CANCELED 1.0 - 4.8 K/uL    Mono # CANCELED 0.3 - 1.0 K/uL    Eos # CANCELED 0.0 - 0.5 K/uL    Baso # CANCELED 0.00 - 0.20 K/uL    nRBC 0 0 /100 WBC    Gran% 29.0 (L) 38.0 - 73.0 %    Lymph% 47.0 18.0 - 48.0 %    Mono% 4.0 4.0 - 15.0 %    Eosinophil% 1.0 0.0 - 8.0 %    Basophil% 0.0 0.0 - 1.9 %    Bands 2.0 %    Metamyelocytes 3.0 %    Myelocytes 1.0 %    Blasts 13.0 (A) 0 %    Platelet Estimate Decreased (A)     Aniso Slight     Poik Slight     Poly Occasional     Ovalocytes Occasional     Smudge Cells Present     Differential Method Manual    Magnesium    Collection Time: 04/08/18  3:47 AM   Result Value Ref Range    Magnesium 2.3 1.6 - 2.6 mg/dL   Phosphorus    Collection Time: 04/08/18  3:47 AM   Result Value Ref Range    Phosphorus 3.3 2.7 - 4.5 mg/dL       Diagnostic Results:  Imaging Results          X-Ray Chest 1 View (Final result)  Result time 04/07/18 07:50:28    Final result by Belle Donaldson MD (04/07/18 07:50:28)                 Impression:      No detrimental change since March 17, 2018      Electronically signed by: Belle Donaldson MD  Date:    04/07/2018  Time:    07:50             Narrative:    EXAMINATION:  XR CHEST 1 VIEW    CLINICAL HISTORY:  . Presence of other vascular implants and grafts    TECHNIQUE:  Single frontal portable view of the chest was performed.    COMPARISON:  March 17, 2018    FINDINGS:  Support devices:  Indwelling right-sided central venous catheter has its tip overlying the expected location of the superior vena cava    The lungs are clear and similar to March 17, 2018, with normal appearance of pulmonary vasculature and no large pleural effusion or pneumothorax.    The cardiac silhouette is normal in size. The hilar and mediastinal contours are unremarkable.  There are atherosclerotic calcifications of the thoracic aorta    Bones are stable.

## 2018-04-08 NOTE — ASSESSMENT & PLAN NOTE
Likely due to MDS in acute setting of bleeding from port-A-cath site due to thrombocytopenia.   Received two units of pRBC 4/5/18 in the infusion unit. Hgb 6.5 on repeat CBC in ED.   Given one unit of pRBC 4/7/18  Follow daily CBC and transfuse for goal hgb >7g/dL.

## 2018-04-08 NOTE — PROGRESS NOTES
[facility]  Hematology  Bone Marrow Transplant  Progress Note    Patient Name: Brooke Olivas  Admission Date: 4/7/2018  Hospital Length of Stay: 1 days  Code Status: Full Code  INTERVAL HISTORY:  Patient seen and examined this AM. Patient's bleeding has resolved. She received 3 packs of platelets yesterday along with tranexamic acid x2. She was restarted on her home amicar. In addition, she received 1u pRBC for anemia. IR evaluated patient and applied dermabond dressing x2. Today she reports continued cessation of bleed. She reports discomfort from port but stable. She has back pain from sleeping. Patient denies cardiac chest pains, palpitations, SOB, n/v, abdo pain, constipation/diarrhea, dysuria. No other issues.     Oncology History from Dr. Hopkins's last office visit:   --Began noticing bruising around January 28th, 2018 on her extremities and  presented to the PCP on February 12th for evaluation with blood work.  The patient states she was found to have platelets in the 25k range.    --Bone marrow biopsy on 3/08/18 showed MDS with 7% blasts in the BM marrow. --On first lab check at [facility] she was noted to have anemia with hemoglobin of  ~5 and plt count of 7k.  --[place] [facility] [place]18 for refractory pancytopenia. 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 12 % Blasts. 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    Prescriptions Prior to Admission   Medication Sig Dispense Refill Last Dose    acetaminophen (TYLENOL) 325 MG tablet Take 325 mg by mouth 2 (two) times daily.   4/6/2018    aminocaproic acid 1,000 mg Tab Take 2,000 mg by mouth every 8 (eight) hours. 180 each 1 4/6/2018    hydroCHLOROthiazide  (MICROZIDE) 12.5 mg capsule Take 12.5 mg by mouth once daily.   Past Month    irbesartan (AVAPRO) 300 MG tablet Take 300 mg by mouth once daily.   Past Month    nebivolol (BYSTOLIC) 5 MG Tab Take 5 mg by mouth once daily.    Past Month    ondansetron (ZOFRAN-ODT) 4 MG TbDL Take 1 tablet (4 mg total) by mouth every 8 (eight) hours as needed. Take prior to Azacitidine and as needed for nausea/vomiting. 30 tablet 5 4/6/2018    traMADol (ULTRAM) 50 mg tablet Take 1 tablet (50 mg total) by mouth every 6 (six) hours as needed for Pain. 30 tablet 0 4/6/2018    albuterol (VENTOLIN HFA) 90 mcg/actuation inhaler Inhale 1 puff into the lungs every 6 (six) hours as needed for Wheezing or Shortness of Breath. Rescue   More than a month    AMICAR 500 mg Tab           Cephalosporins; Penicillins; and Sulfa (sulfonamide antibiotics)     Past Medical History:   Diagnosis Date    Essential hypertension 3/17/2018    Mild asthma 3/17/2018    Smoker      Past Surgical History:   Procedure Laterality Date    PORTACATH PLACEMENT      TUBAL LIGATION       Family History     None        Social History Main Topics    Smoking status: Former Smoker     Packs/day: 1.50     Years: 40.00     Types: Cigarettes     Quit date: 3/23/2018    Smokeless tobacco: Never Used    Alcohol use No    Drug use: No    Sexual activity: Not Currently       Review of Systems   Constitutional: no fever or chills  ENT: no nasal congestion or sore throat  Respiratory: no cough + DIMAS   Cardiovascular: + chest pain, no palpitations  Gastrointestinal: no nausea/vomiting, no abdominal pain or abdominal distention   Genitourinary: no hematuria or dysuria  Integument/Breast: no rash or pruritis  Hematologic/Lymphatic: + easy bruising no lymphadenopathy  Musculoskeletal: no arthralgias or myalgias  Neurological: no seizures or tremors  Endocrine: no heat or cold intolerance    Objective:     Vital Signs (Most Recent):  Temp: 98.7 °F (37.1 °C) (04/08/18  0743)  Pulse: 82 (04/08/18 0743)  Resp: 18 (04/08/18 0743)  BP: 136/86 (04/08/18 0743)  SpO2: (!) 94 % (04/08/18 0743) Vital Signs (24h Range):  Temp:  [98.5 °F (36.9 °C)-101.5 °F (38.6 °C)] 98.7 °F (37.1 °C)  Pulse:  [77-92] 82  Resp:  [13-21] 18  SpO2:  [94 %-99 %] 94 %  BP: (110-148)/(64-87) 136/86     Weight: 76.7 kg (169 lb)  Body mass index is 29.01 kg/m².  Body surface area is 1.86 meters squared.        Lines/Drains/Airways     Central Venous Catheter Line                 Port A Cath Single Lumen 04/05/18 1057  2 days          Peripheral Intravenous Line                 Peripheral IV - Single Lumen 04/07/18 0714 Left Antecubital 1 day         Peripheral IV - Single Lumen 04/07/18 1610 Left Wrist less than 1 day                Physical Exam  General: well developed, well nourished, appears to be in NAD.   Eyes: conjunctivae/corneas clear. PERRL. EOMI.  Neck: supple, symmetrical, trachea midline, no JVD.  Cardiovascular: Heart: regular rate and rhythm, S1, S2 normal, no murmur, click, rub or gallop.  Lungs: clear to auscultation bilaterally and normal respiratory effort.   Chest Wall: tenderness and active oozing overlying port-A cath.   Abdomen/Rectal: Abdomen: Non distended. + BS. No masses. No TTP.   Rectal: not examined  Extremities: no edema, no redness or tenderness in the calves or thighs. Pulses: 2+ and symmetric  Skin: No rashes or ulcers. + dispersed petechiae and ecchymosis. Patient has hematoma over her right upper chest around the site of her port-A cath. No active bleed  Musculoskeletal: full range of motion of joints  Lymph Nodes: No cervical or supraclavicular adenopathy  Psych/Behavioral: Normal. Alert and oriented, appropriate affect.    Significant Labs:   Recent Results (from the past 24 hour(s))   CBC auto differential    Collection Time: 04/07/18 11:06 AM   Result Value Ref Range    WBC 3.09 (L) 3.90 - 12.70 K/uL    RBC 2.17 (L) 4.00 - 5.40 M/uL    Hemoglobin 6.5 (L) 12.0 - 16.0 g/dL     Hematocrit 18.8 (LL) 37.0 - 48.5 %    MCV 87 82 - 98 fL    MCH 30.0 27.0 - 31.0 pg    MCHC 34.6 32.0 - 36.0 g/dL    RDW 13.3 11.5 - 14.5 %    Platelets 3 (LL) 150 - 350 K/uL    MPV SEE COMMENT 9.2 - 12.9 fL    Immature Granulocytes CANCELED 0.0 - 0.5 %    Immature Grans (Abs) CANCELED 0.00 - 0.04 K/uL    Lymph # Test Not Performed 1.0 - 4.8 K/uL    Mono # Test Not Performed 0.3 - 1.0 K/uL    Eos # Test Not Performed 0.0 - 0.5 K/uL    Baso # Test Not Performed 0.00 - 0.20 K/uL    nRBC 0 0 /100 WBC    Gran% 38.0 38.0 - 73.0 %    Lymph% 38.0 18.0 - 48.0 %    Mono% 12.0 4.0 - 15.0 %    Eosinophil% 0.0 0.0 - 8.0 %    Basophil% 0.0 0.0 - 1.9 %    Bands 4.0 %    Metamyelocytes 2.0 %    Myelocytes 2.0 %    Blasts 4.0 (A) 0 %    Aniso Slight     Poik Slight     Poly Occasional     Hypo Occasional     Ovalocytes Occasional     Differential Method Manual    Prepare RBC 2 Units; difficult match    Collection Time: 04/07/18 11:49 AM   Result Value Ref Range    UNIT NUMBER [id number]     PRODUCT CODE M1756R38     DISPENSE STATUS TRANSFUSED     CODING SYSTEM QFIU789     Unit Blood Type Code 0600     Unit Blood Type A NEG     Unit Expiration [id number]     UNIT NUMBER [id number]     PRODUCT CODE V4809M09     DISPENSE STATUS TRANSFUSED     CODING SYSTEM ICZN284     Unit Blood Type Code 6200     Unit Blood Type A POS     Unit Expiration [id number]    Type & Screen    Collection Time: 04/07/18 11:56 AM   Result Value Ref Range    Group & Rh A POS     Indirect Keyana POS    Indirect Antiglobulin Test    Collection Time: 04/07/18 11:56 AM   Result Value Ref Range    Antibody Screen POS    Prepare Platelets 1 Dose    Collection Time: 04/07/18 11:56 AM   Result Value Ref Range    UNIT NUMBER [id number]     PRODUCT CODE F0690U85     DISPENSE STATUS TRANSFUSED     CODING SYSTEM KEOC574     Unit Blood Type Code 6200     Unit Blood Type A POS     Unit Expiration [id number]    Prepare RBC 1 Unit    Collection Time: 04/07/18  11:56 AM   Result Value Ref Range    UNIT NUMBER [id number]     PRODUCT CODE G5700A98     DISPENSE STATUS CROSSMATCHED     CODING SYSTEM HELL268     Unit Blood Type Code 0600     Unit Blood Type A NEG     Unit Expiration [id number]    Prepare Platelets 1 Dose    Collection Time: 04/07/18 11:56 AM   Result Value Ref Range    UNIT NUMBER [id number]     PRODUCT CODE X7893N84     DISPENSE STATUS TRANSFUSED     CODING SYSTEM TIVK838     Unit Blood Type Code 6200     Unit Blood Type A POS     Unit Expiration [id number]    Blood culture    Collection Time: 04/07/18  4:04 PM   Result Value Ref Range    Blood Culture, Routine No Growth to date    Blood culture    Collection Time: 04/07/18  4:10 PM   Result Value Ref Range    Blood Culture, Routine No Growth to date    CBC auto differential    Collection Time: 04/07/18  9:01 PM   Result Value Ref Range    WBC 4.16 3.90 - 12.70 K/uL    RBC 2.25 (L) 4.00 - 5.40 M/uL    Hemoglobin 6.9 (L) 12.0 - 16.0 g/dL    Hematocrit 19.7 (LL) 37.0 - 48.5 %    MCV 88 82 - 98 fL    MCH 30.7 27.0 - 31.0 pg    MCHC 35.0 32.0 - 36.0 g/dL    RDW 13.6 11.5 - 14.5 %    Platelets 11 (LL) 150 - 350 K/uL    MPV 9.4 9.2 - 12.9 fL    Immature Granulocytes CANCELED 0.0 - 0.5 %    Immature Grans (Abs) CANCELED 0.00 - 0.04 K/uL    Lymph # Test Not Performed 1.0 - 4.8 K/uL    Mono # Test Not Performed 0.3 - 1.0 K/uL    Eos # Test Not Performed 0.0 - 0.5 K/uL    Baso # Test Not Performed 0.00 - 0.20 K/uL    nRBC 0 0 /100 WBC    Gran% 31.0 (L) 38.0 - 73.0 %    Lymph% 43.0 18.0 - 48.0 %    Mono% 1.0 (L) 4.0 - 15.0 %    Eosinophil% 0.0 0.0 - 8.0 %    Basophil% 0.0 0.0 - 1.9 %    Bands 4.0 %    Metamyelocytes 2.0 %    Myelocytes 5.0 %    Blasts 14.0 (A) 0 %    Aniso Slight     Differential Method Manual    Basic metabolic panel    Collection Time: 04/08/18  3:47 AM   Result Value Ref Range    Sodium 138 136 - 145 mmol/L    Potassium 2.9 (L) 3.5 - 5.1 mmol/L    Chloride 100 95 - 110 mmol/L    CO2 27 23 -  29 mmol/L    Glucose 105 70 - 110 mg/dL    BUN, Bld 14 8 - 23 mg/dL    Creatinine 0.6 0.5 - 1.4 mg/dL    Calcium 8.3 (L) 8.7 - 10.5 mg/dL    Anion Gap 11 8 - 16 mmol/L    eGFR if African American >60.0 >60 mL/min/1.73 m^2    eGFR if non African American >60.0 >60 mL/min/1.73 m^2   CBC auto differential    Collection Time: 04/08/18  3:47 AM   Result Value Ref Range    WBC 3.59 (L) 3.90 - 12.70 K/uL    RBC 2.54 (L) 4.00 - 5.40 M/uL    Hemoglobin 7.6 (L) 12.0 - 16.0 g/dL    Hematocrit 22.0 (L) 37.0 - 48.5 %    MCV 87 82 - 98 fL    MCH 29.9 27.0 - 31.0 pg    MCHC 34.5 32.0 - 36.0 g/dL    RDW 13.3 11.5 - 14.5 %    Platelets 7 (LL) 150 - 350 K/uL    MPV 8.6 (L) 9.2 - 12.9 fL    Immature Granulocytes CANCELED 0.0 - 0.5 %    Immature Grans (Abs) CANCELED 0.00 - 0.04 K/uL    Lymph # CANCELED 1.0 - 4.8 K/uL    Mono # CANCELED 0.3 - 1.0 K/uL    Eos # CANCELED 0.0 - 0.5 K/uL    Baso # CANCELED 0.00 - 0.20 K/uL    nRBC 0 0 /100 WBC    Gran% 29.0 (L) 38.0 - 73.0 %    Lymph% 47.0 18.0 - 48.0 %    Mono% 4.0 4.0 - 15.0 %    Eosinophil% 1.0 0.0 - 8.0 %    Basophil% 0.0 0.0 - 1.9 %    Bands 2.0 %    Metamyelocytes 3.0 %    Myelocytes 1.0 %    Blasts 13.0 (A) 0 %    Platelet Estimate Decreased (A)     Aniso Slight     Poik Slight     Poly Occasional     Ovalocytes Occasional     Smudge Cells Present     Differential Method Manual    Magnesium    Collection Time: 04/08/18  3:47 AM   Result Value Ref Range    Magnesium 2.3 1.6 - 2.6 mg/dL   Phosphorus    Collection Time: 04/08/18  3:47 AM   Result Value Ref Range    Phosphorus 3.3 2.7 - 4.5 mg/dL       Diagnostic Results:  Imaging Results          X-Ray Chest 1 View (Final result)  Result time 04/07/18 07:50:28    Final result by Belle Donaldson MD (04/07/18 07:50:28)                 Impression:      No detrimental change since March 17, 2018      Electronically signed by: Belle Donaldson MD  Date:    04/07/2018  Time:    07:50             Narrative:    EXAMINATION:  XR CHEST 1  VIEW    CLINICAL HISTORY:  . Presence of other vascular implants and grafts    TECHNIQUE:  Single frontal portable view of the chest was performed.    COMPARISON:  March 17, 2018    FINDINGS:  Support devices: Indwelling right-sided central venous catheter has its tip overlying the expected location of the superior vena cava    The lungs are clear and similar to March 17, 2018, with normal appearance of pulmonary vasculature and no large pleural effusion or pneumothorax.    The cardiac silhouette is normal in size. The hilar and mediastinal contours are unremarkable.  There are atherosclerotic calcifications of the thoracic aorta    Bones are stable.                                  Assessment/Plan:     * Bleeding disorder    - Likely due to thrombocytopenia.  - Received 3 packs of PLT 4/7/18 along with tranexamic acid   - Received another pack of PLT 4/8/18  - Continue Amicar 2g [id number]    - Improved, continue to monitor          Fever    - Tm 101.5 on 4/7/18  - unclear if timing was during infusion  - cultures obtained  - started aztreonam given penicillin/cephalosporin allergy on 4/7/18  - likely due to multiple blood product transfusions  - antibiotics stopped today  - continue to monitor, follow up culture results        COPD (chronic obstructive pulmonary disease)    Prn albuterol.  Quit smoking 3 weeks ago. Has smoked for 40 years, >1ppd.        MDS (myelodysplastic syndrome)    Diagnosed 3/8/2018 with BMBx showing blasts of 7% on BMBx.  Follows with Dr. Hopkins in clinic. Due to start chemo this Monday in the infusion center.         Symptomatic anemia    Likely due to MDS in acute setting of bleeding from port-A-cath site due to thrombocytopenia.   Received two units of pRBC 4/5/18 in the infusion unit. Hgb 6.5 on repeat CBC in ED.   Given one unit of pRBC 4/7/18  Follow daily CBC and transfuse for goal hgb >7g/dL.        Thrombocytopenia, unspecified    - likely due to MDS  - plt 6 in ED   - received 3  units of plt yesterday  - received another unit of plt this morning  - will monitor for now as bleeding is now better controlled  - continue amicar        Mild intermittent asthma without complication    Prn albuterol        Essential hypertension    Not currently taking anti-HTN at home.   BP controlled in ED.             VTE Risk Mitigation         Ordered     Place sequential compression device  Until discontinued      04/07/18 1138     IP VTE HIGH RISK PATIENT  Once      04/07/18 1138          Disposition: possibly d/c in 1-2 days pending fevers, cultures, and hemostasis    Darrel Espinoza MD  Bone Marrow Transplant  [facility]

## 2018-04-08 NOTE — ASSESSMENT & PLAN NOTE
- Tm 101.5 on 4/7/18  - unclear if timing was during infusion  - cultures obtained, will follow up results  - started aztreonam given penicillin/cephalosporin allergy  On 4/7/18  - continue to monitor

## 2018-04-08 NOTE — DISCHARGE SUMMARY
DISCHARGE SUMMARY  Hematology / Bone Marrow Transplant    Team: Networked reference to record PCT     Patient Name: Brooke Olivas  YOB: 1955    Admit Date: 4/7/2018    Discharge Date: 04/08/2018    Discharge Attending Physician: Ankita Barbosa MD     Diagnoses:  Active Hospital Problems    Diagnosis  POA    *Bleeding disorder [D68.9]  Yes    Fever [R50.9]  Unknown    COPD (chronic obstructive pulmonary disease) [J44.9]  Unknown    MDS (myelodysplastic syndrome) [D46.9]  Yes    Symptomatic anemia [D64.9]  Yes    Thrombocytopenia, unspecified [D69.6]  Yes    Essential hypertension [I10]  Yes    Mild intermittent asthma without complication [J45.20]  Yes      Resolved Hospital Problems    Diagnosis Date Resolved POA    Bleeding [R58] 04/07/2018 Unknown       Discharged Condition: admit problems have stabilized      HOSPITAL COURSE:   Patient is a 62 year old female with hx of MDS diagnosed 3/08/18 with 7% blasts on BMBx, HTN, smoking who presents to the ED due to refractory blood oozing from her Port-A-cath. Patient had an elective port-A-cath placement on 4/5/2018 prior to starting chemotherapy for MDS due on Monday 4/9/2018. Patient noted blood oozing and soaking of site of port-A-cath shortly after placement. Patient presented the day prior to admission to the infusion center yesterday morning for constant bleeding and soaking of dressing and received 2 units of blood and one unit of platelets. Her bleeding persisted beyond 4-5 dressing changes and receiving blood products but still went home. Last evening, her daughter, who is a physician, changed patient's dressings and applied surgicel but the bleeding persisted despite that and they presented to the ED the morning of admission. Patient reported DIMAS and lightheadedness on exertion. Patient reported bleeding of her gums and skin with diffuse bruising. However, patient denied any hematemesis, melena, or hematuria.      Hemostasis  attempted in the ED with manual pressure and 1 pack PLT transfusion but was unsuccessful. She was admitted for further management of her bleeding. She received 1u pRBC for anemia, and she received 2 additional packs of platelets for continued bleeding. She also received tranexamic acid and amicar during her hospital stay. She was also evaluated by IR, and IR placed dermabond dressing twice with additional manual pressure. She required another pack of PLT on the morning of 4/8/18. She ultimately achieved hemostasis and was monitored for any recurrent bleeding. She was subsequently discharged home on 4/8/18 with instructions to not remove her dressing until she was seen in clinic the following day; she will continue on her home po amicar.     Other Medical Problems Addressed in the Hospital:    Fever: Patient had isolated fever on 4/7/18 and started on antibiotics. Fever felt to be related to multiple transfusions of blood products. Antibiotics discontinued as ANC>1000 prior to discharge. Will need to follow up blood cultures.     COPD: continued on prn albuterol    HTN: not taking any antihypertensive meds, monitored during hospitalization    CONSULTS: none    Last CBC/BMP/HgbA1c (if applicable):  Recent Results (from the past 336 hour(s))   CBC auto differential    Collection Time: 04/08/18  3:47 AM   Result Value Ref Range    WBC 3.59 (L) 3.90 - 12.70 K/uL    Hemoglobin 7.6 (L) 12.0 - 16.0 g/dL    Hematocrit 22.0 (L) 37.0 - 48.5 %    Platelets 7 (LL) 150 - 350 K/uL   CBC auto differential    Collection Time: 04/07/18  9:01 PM   Result Value Ref Range    WBC 4.16 3.90 - 12.70 K/uL    Hemoglobin 6.9 (L) 12.0 - 16.0 g/dL    Hematocrit 19.7 (LL) 37.0 - 48.5 %    Platelets 11 (LL) 150 - 350 K/uL   CBC auto differential    Collection Time: 04/07/18 11:06 AM   Result Value Ref Range    WBC 3.09 (L) 3.90 - 12.70 K/uL    Hemoglobin 6.5 (L) 12.0 - 16.0 g/dL    Hematocrit 18.8 (LL) 37.0 - 48.5 %    Platelets 3 (LL) 150 - 350  K/uL     Recent Results (from the past 336 hour(s))   Basic metabolic panel    Collection Time: 04/08/18  3:47 AM   Result Value Ref Range    Sodium 138 136 - 145 mmol/L    Potassium 2.9 (L) 3.5 - 5.1 mmol/L    Chloride 100 95 - 110 mmol/L    CO2 27 23 - 29 mmol/L    BUN, Bld 14 8 - 23 mg/dL    Creatinine 0.6 0.5 - 1.4 mg/dL    Calcium 8.3 (L) 8.7 - 10.5 mg/dL    Anion Gap 11 8 - 16 mmol/L     No results found for: HGBA1C    Other Pertinent Lab Findings:    Microbiology Results (last 7 days)     Procedure Component Value Units Date/Time    Blood culture [[id number]] Collected:  04/07/18 1604    Order Status:  Completed Specimen:  Blood from Peripheral, Wrist, Right Updated:  04/08/18 0115     Blood Culture, Routine No Growth to date    Blood culture [[id number]] Collected:  04/07/18 1610    Order Status:  Completed Specimen:  Blood from Peripheral, Antecubital, Right Updated:  04/08/18 0115     Blood Culture, Routine No Growth to date        Pertinent/Significant Diagnostic Studies:      CXR 4/7/18:  Support devices: Indwelling right-sided central venous catheter has its tip overlying the expected location of the superior vena cava    The lungs are clear and similar to March 17, 2018, with normal appearance of pulmonary vasculature and no large pleural effusion or pneumothorax.    The cardiac silhouette is normal in size. The hilar and mediastinal contours are unremarkable.  There are atherosclerotic calcifications of the thoracic aorta    Bones are stable.    Disposition:  Home      Future Scheduled Appointments:  Future Appointments  Date Time Provider Department Center   4/9/2018 8:45 AM [facility], [facility]   4/9/2018 10:00 AM Stephanie Hopkins MD [facility]   4/9/2018 10:30 AM [facility]   4/10/2018 10:30 AM [facility]   4/11/2018 10:30 AM [facility]   4/12/2018 9:00 AM [facility]  [facility]   4/12/2018 10:00 AM [facility]   4/13/2018 10:30 AM [facility]   4/16/2018 8:45 AM [facility], [facility]   4/16/2018 10:30 AM [facility]   4/17/2018 10:30 AM [facility]   4/19/2018 8:45 AM INJE[facility], [facility]   4/19/2018 10:00 AM [facility]   4/23/2018 9:00 AM INJE[facility], [facility]   4/23/2018 10:00 AM [facility]   4/26/2018 9:00 AM INJE[facility], [facility]   4/26/2018 10:00 AM [facility]   4/30/2018 9:00 AM [facility]JE[facility], [facility]   4/30/2018 10:00 AM [facility]   5/3/2018 9:00 AM [facility], [facility]   5/3/2018 10:00 AM [facility]       Follow-up Plans from This Hospitalization:  Hematology/Oncology (Dr. Hopkins)    Discharge Medication List:   Marycarmenconcepcion Brookeori Bang   Home Medication Instructions MICHAEL:[id number]    Printed on:04/08/18 1030   Medication Information                      acetaminophen (TYLENOL) 325 MG tablet  Take 325 mg by mouth 2 (two) times daily.             albuterol (VENTOLIN HFA) 90 mcg/actuation inhaler  Inhale 1 puff into the lungs every 6 (six) hours as needed for Wheezing or Shortness of Breath. Rescue             aminocaproic acid 1,000 mg Tab  Take 2,000 mg by mouth every 8 (eight) hours.             ondansetron (ZOFRAN-ODT) 4 MG TbDL  Take 1 tablet (4 mg total) by mouth every 8 (eight) hours as needed. Take prior to Azacitidine and as needed for nausea/vomiting.             traMADol (ULTRAM) 50 mg tablet  Take 1 tablet (50 mg total) by mouth every 6 (six) hours as needed for Pain.                 Patient Instructions:    Discharge Procedure Orders  Notify your health care provider if you  experience any of the following:  temperature >100.4     Notify your health care provider if you experience any of the following:  persistent nausea and vomiting or diarrhea     Notify your health care provider if you experience any of the following:  severe uncontrolled pain     Notify your health care provider if you experience any of the following:  redness, tenderness, or signs of infection (pain, swelling, redness, odor or green/yellow discharge around incision site)     Notify your health care provider if you experience any of the following:  difficulty breathing or increased cough     Notify your health care provider if you experience any of the following:  severe persistent headache     Notify your health care provider if you experience any of the following:  worsening rash     Notify your health care provider if you experience any of the following:  persistent dizziness, light-headedness, or visual disturbances     Notify your health care provider if you experience any of the following:  increased confusion or weakness     Notify your health care provider if you experience any of the following:   Order Comments: Uncontrolled bleeding   Scheduling Instructions: Uncontrolled bleeding         Darrel Espinoza MD (PGY-5)

## 2018-04-08 NOTE — PLAN OF CARE
Problem: Patient Care Overview  Goal: Plan of Care Review  Outcome: Ongoing (interventions implemented as appropriate)  Plan of care reviewed with the patient at the beginning of the shift. Bleeding precautions maintained. Port site has not bled thru the dressing overnight. She received one unit of platelets and one unit of blood overnight. No sign of reaction to either blood product noted. Skin is extremely ecchymotic with petechiae. Vitals stable. Pt afebrile. Vanc and aztreonam continued. Fall precautions maintained. Pt remained free from falls and injury this shift. Bed locked in lowest position, side rails up x2, call light within reach. Instructed pt to call for assistance as needed. Pt verbalized understanding. No acute issues overnight. Will continue to monitor.

## 2018-04-08 NOTE — ASSESSMENT & PLAN NOTE
- Likely due to thrombocytopenia.  - Received 3 packs of PLT 4/7/18 along with tranexamic acid   - Received another pack of PLT 4/8/18  - Continue Amicar 2g [id number]    - Improved, continue to monitor

## 2018-04-09 NOTE — PROGRESS NOTES
Hematology and Medical Oncology   Follow Up Visit     03/28/2018    Primary Hematologic Diagnosis: High Grade MDS --Enrolled in clinical trial RSH 1612    History of Present Ilness:   Brooke Olivas (Brooke) is a pleasant 62 y.o.female who recently transferred care to [facility] from [facility].    Hematology History:  --Began noticing bruising around January 28th, 2018 on her extremities and  presented to the PCP on February 12th for evaluation with blood work.  The patient states she was found to have platelets in the 25k range.    --Bone marrow biopsy on 3/08/18 showed MDS with 7% blasts in the BM marrow. --On first lab check at [facility] she was noted to have anemia with hemoglobin of  ~5 and plt count of 7k.  --Admitted at [facility] [place] 3/16/18-3/22/18 for refractory pancytopenia. 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 12 % Blasts. 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  --Enrolled in clinical trial RSH 1612 Cycle 1 Day1: 4/9/18    Interval History:  Ms Olivas presents today with her daughter to prior to the initiation of RSH 1612. A port was placed on Thursday, with resulting substantial bruising and bleeding. Requiring hospitalization over the weekend for blood, platelets and with the goal of stopping the bleeding. In the ED there were numerous episodes of alexander bleeding from the incision site.     She is eager to start therapy.     The bruising is primarily centered around her right upper chest her neck and bilateral breast. The port site is tender but no further signs of active bleeding.     PAST MEDICAL HISTORY:   Past Medical History:   Diagnosis Date    Essential hypertension 3/17/2018    Mild asthma 3/17/2018    Smoker         PAST SURGICAL HISTORY:   Past Surgical History:   Procedure Laterality Date    PORTACATH PLACEMENT      TUBAL LIGATION         PAST SOCIAL HISTORY:   reports that she quit smoking about 2 weeks ago. Her smoking use included Cigarettes. She has a 60.00 pack-year smoking history. She has never used smokeless tobacco. She reports that she does not drink alcohol or use drugs..    FAMILY HISTORY:  No family history on file.    CURRENT MEDICATIONS:   Current Outpatient Prescriptions   Medication Sig    acetaminophen (TYLENOL) 325 MG tablet Take 325 mg by mouth 2 (two) times daily.    albuterol (VENTOLIN HFA) 90 mcg/actuation inhaler Inhale 1 puff into the lungs every 6 (six) hours as needed for Wheezing or Shortness of Breath. Rescue    aminocaproic acid 1,000 mg Tab Take 2,000 mg by mouth every 8 (eight) hours.    ondansetron (ZOFRAN-ODT) 4 MG TbDL Take 1 tablet (4 mg total) by mouth every 8 (eight) hours as needed. Take prior to Azacitidine and as needed for nausea/vomiting.     No current facility-administered medications for this visit.      ALLERGIES:   Review of patient's allergies indicates:   Allergen Reactions    Penicillins     Sulfa (sulfonamide antibiotics)          Review of Systems:     Review of Systems   Constitutional: Positive for fatigue and unexpected weight change. Negative for appetite change, chills, diaphoresis and fever.   HENT:   Negative for hearing loss, mouth sores, nosebleeds, sore throat, trouble swallowing and voice change.    Eyes: Negative for eye problems and icterus.   Respiratory: Negative for chest tightness, cough, hemoptysis, shortness of breath and wheezing.    Cardiovascular: Negative for chest pain, leg swelling and palpitations.   Gastrointestinal: Negative for abdominal distention, abdominal pain, blood in stool, diarrhea, nausea and vomiting.   Endocrine: Negative for hot flashes.   Genitourinary: Negative for bladder incontinence, difficulty urinating, dysuria  and hematuria.    Musculoskeletal: Negative for arthralgias, back pain, flank pain, gait problem, myalgias, neck pain and neck stiffness.   Skin: Negative for itching, rash and wound.   Neurological: Negative for dizziness, extremity weakness, gait problem, headaches, numbness, seizures and speech difficulty.   Hematological: Negative for adenopathy. Bruises/bleeds easily.   Psychiatric/Behavioral: Negative for confusion, depression and sleep disturbance. The patient is not nervous/anxious.         Physical Exam:     Vitals:    04/09/18 0925   BP: 136/72   Pulse: 93   Resp: 18   Temp: 98.5 °F (36.9 °C)       Physical Exam   Constitutional: She is oriented to person, place, and time. She appears well-developed and well-nourished. No distress.   HENT:   Head: Normocephalic and atraumatic.   Mouth/Throat: Oropharynx is clear and moist. No oropharyngeal exudate.   Eyes: Conjunctivae and EOM are normal. Pupils are equal, round, and reactive to light.   Neck: Normal range of motion. Neck supple. No JVD present. No tracheal deviation present. No thyromegaly present.   Cardiovascular: Normal rate, regular rhythm and normal heart sounds.  Exam reveals no friction rub.    No murmur heard.  Pulmonary/Chest: Effort normal and breath sounds normal. No stridor. No respiratory distress. She has no wheezes. She has no rales. She exhibits no tenderness.   Abdominal: Soft. Bowel sounds are normal. She exhibits no distension. There is no tenderness. There is no rebound and no guarding.   Musculoskeletal: Normal range of motion. She exhibits no edema, tenderness or deformity.   Lymphadenopathy:     She has no axillary adenopathy.   Neurological: She is alert and oriented to person, place, and time. She displays normal reflexes. No cranial nerve deficit or sensory deficit. She exhibits normal muscle tone. Coordination normal.   Skin: Skin is warm and dry. Capillary refill takes less than 2 seconds. No rash noted. She is not diaphoretic.  No erythema. No pallor.   Diffuse exchymosis   Psychiatric: She has a normal mood and affect. Her behavior is normal. Judgment and thought content normal.       ECOG Performance Status: (foot note - ECOG PS provided by Eastern Cooperative Oncology Group) 1 - Symptomatic but completely ambulatory    Karnofsky Performance Score:  80%- Normal Activity with Effort: Some Symptoms of Disease    Labs:   Lab Results   Component Value Date    WBC 3.59 (L) 04/08/2018    HGB 7.6 (L) 04/08/2018    HCT 22.0 (L) 04/08/2018    PLT 7 (LL) 04/08/2018    ALT 21 04/07/2018    AST 19 04/07/2018     04/08/2018    K 3.4 (L) 04/08/2018     04/08/2018    CREATININE 0.7 04/08/2018    BUN 15 04/08/2018    CO2 28 04/08/2018    INR 1.1 04/07/2018       Imaging:  Previous imaging has been reviewed    Assessment and Plan:     Ms. Olivas is a pleasant 62 year old female with newly diagnosed high grade MDS    MDS (myelodysplastic syndrome) with 5q deletion  --12% blasts on the most recent bone marrow biopsy  --Initiation of   trial for MDS/AML. She was randomized to the Azacitadine arm  --Consent signed at the last visit    Anemia  --Transfusion dependent --will receive 2 units today given significant hct drop following port placement  --Plan on twice a week labs with transfusion PRN  --2 Antibodies have been identified    Thrombocytopenia  --Refractory to transfusions  --Continue on amicar for now  --We discussed the signs and symptoms of a major bleed including headache, blurry vision and signs of bleeding that should have him present to the Emergency Room for immediate attention    Supportive Care  --Will await healing prior to using the port for more reliable IV access  --Diagnosis associated depression --- will start Wellbutrin    Hypertension   --Managed per PCP  --Continue home medication    30 minutes were spent face to face with the patient and her  family to discuss the disease, natural history, treatment options and  survival statistics. I have provided the patient with an opportunity to ask questions and have all questions answered to her satisfaction.       she will return to clinic in 2 weeks, but knows to call in the interim if symptoms change or should a problem arise.        Stephanie Hopkins MD  Hematology and Medical Oncology  Bone Marrow Transplant  [facility]

## 2018-04-09 NOTE — PROGRESS NOTES
"Monday April 9th, 2018     Protocol: , A Randomized Phase II/III Trial of Novel Therapeutics Versus Azacitidine in Newly Diagnosed Patients with Acute Myeloid Leukemia (AML) or High-Risk Myelodysplastic Syndrome (MDS), Age 60 or Older   Sponsor:  SWOG  Study ID: [id number]  IRB #: [id number]  Investigator: Stan Garner Initials: F,S     Cycle 1 Day 1   Arm A - Azacitidine (subcutaneous)     Patient presents to clinic prior to administration to C1D1 of Azacitidine. She is awake, alert, and oriented to person place and time. Patient is accompanied by her daughter, Andree. Patient had port placed on Thursday 4/5. Per patient had significant bleeding noted to dressing on Saturday morning and went to the ER. Patient states that she was transfused with multiple units of platelets, and a unit of PRBCs. See blood transfusion flowsheet. She was discharged last night. Per patient, she has not noted any bleeding to surgical site since discharge. After discussion with Dr. Hopkins, port will not be used for Vidaza infusion today; instead she will receive chemotherapy through PIV until her port has time to heal. Patient states that she is "very tired," and does not want to do a lot. States that she "is ready to start chemotherapy today."     MD reviewed labs with patient. Patient will receive a unit of platelets today. Will get labs done 3x weekly with transfusions as needed until counts are more stable. F/U to be scheduled in 2 weeks per Dr. Hopkins. MD reviewed and signed treatment plan. Patient forgot to bring home dose of zofran. MD ordered one time dose of zofran to be given in infusion center. Per protocol no dose mods during Cycle 1.  H&P, weight, and labs were collected per protocol, see flowsheet. See MD note for ECOG. Patient verbalizes continued willingness to participate in above-mentioned trial at this time and denied other questions at this time.     Review of Baseline AE's  *Please note this list is not all-inclusive, " please see AE log for physician reviewed list of adverse events.   Anemia, Grade 2: Hgb/Hct 8.0/22.7, improved from Grade 3 baseline, last PRBC transfusion 4/7  Platelets decreased, Grade 4: Plts:  6 - will receive plt transfusion today  Hypokalemia, Grade 1: Potassium:  3.4  Hypoalbuminemia, Grade 1: Albumin: 3.3  Blood bilirubin increased, Grade 2: Total bilirubin: 2.1, worsening from baseline. Will continue to monitor.   Fatigue, Grade 2:  See above. Will continue to monitor.   Bruising, Grade 2: Generalized bruising noted to arms and chest.     Review of AE's  *Please note this list is not all-inclusive, please see AE log for physician reviewed list of adverse events.     White blood cells decreased, Grade 1: WBC: 3.63  Neutrophil count decreased, Grade 1: ANC: 1.74  Lymphocyte count decrease, Grade 2: Lymphocytes: 580  Hyponatremia, Grade 1: Sodium: 135      Provided contact information for research RN, as well as clinic staff for any questions/symptoms. Patient aware of upcoming Vidaza and lab appointments. Will notify with any questions or concerns.

## 2018-04-09 NOTE — PLAN OF CARE
Problem: Chemotherapy Effects (Adult)  Goal: Signs and Symptoms of Listed Potential Problems Will be Absent, Minimized or Managed (Chemotherapy Effects)  Signs and symptoms of listed potential problems will be absent, minimized or managed by discharge/transition of care (reference Chemotherapy Effects (Adult) CPG).   Outcome: Ongoing (interventions implemented as appropriate)  Here for [id number] Vidaza.  Side effects discussed.  Pt voiced understanding.

## 2018-04-09 NOTE — PLAN OF CARE
Problem: Patient Care Overview  Goal: Plan of Care Review  Outcome: Ongoing (interventions implemented as appropriate)  Tolerated treatment well.  Advised to call MD for any problems or concerns.  AVS given.  RTC on tomorrow for day 2 cycle 1 Vidaza.  NAD noted upon discharge.

## 2018-04-10 NOTE — PLAN OF CARE
Problem: Patient Care Overview  Goal: Plan of Care Review  Outcome: Ongoing (interventions implemented as appropriate)  Pt arrived for Vidaza D2.  Treatment plan reviewed with pt/daughter, states understanding.  IV started to Left wrist.  IVF infusioning.  Pt resting in chair.

## 2018-04-10 NOTE — PROGRESS NOTES
"Monday April 9th, 2018     Protocol: , A Randomized Phase II/III Trial of Novel Therapeutics Versus Azacitidine in Newly Diagnosed Patients with Acute Myeloid Leukemia (AML) or High-Risk Myelodysplastic Syndrome (MDS), Age 60 or Older   Sponsor:  SWOG  Study ID: [id number]  IRB #: [id number]  Investigator: Stan Garner Initials: F,S     Cycle 1 Day 2   Arm A - Azacitidine (subcutaneous)     Patient presents to clinic prior to administration to C1D2 of Azacitidine. She is awake, alert, and oriented to person place and time. Patient is accompanied by her daughter, Andree. Per patient, port site remains painful. States that after her appointment yesterday she went home and slept, but is still feeling fatigued. Andree, daughter, stated that immediately upon waking she took her mother's temperature and it was 100.4, however, she was "wrapped up in blankets." After waiting about an hour and retaking it, she denies fever. Denies taking any OTC medication to reduce temperature. Per patient, she feels like she is getting constipated; unable to say when her last BM was. Patient states that she does not want to take any miralax yet. Will continue to monitor. Discussed importance of drinking fluids with patient and encouraged her to drink a lot of water. Patient verbalized understanding.     Review of AE's  *Please note this list is not all-inclusive, please see AE log for physician reviewed list of adverse events.     Nausea, Grade 1: Patient stated that last night she did have some nausea. Denies vomiting or fever. Took prescribed Zofran.   Pain, Grade 2: Patient states that she took pain medication for the pain at her port site.    Provided contact information for research RN, as well as clinic staff for any questions/symptoms. Patient aware of upcoming Vidaza and lab appointments. Will notify with any questions or concerns.    "

## 2018-04-10 NOTE — PLAN OF CARE
Problem: Chemotherapy Effects (Adult)  Goal: Signs and Symptoms of Listed Potential Problems Will be Absent, Minimized or Managed (Chemotherapy Effects)  Signs and symptoms of listed potential problems will be absent, minimized or managed by discharge/transition of care (reference Chemotherapy Effects (Adult) CPG).   Outcome: Ongoing (interventions implemented as appropriate)  Pt completed infusion without issue.  IV d/c'd.  AVS given.  Pt discharged to home in wheelchair with daughter.

## 2018-04-11 NOTE — PROGRESS NOTES
"Wednesday April 11th, 2018     Protocol: , A Randomized Phase II/III Trial of Novel Therapeutics Versus Azacitidine in Newly Diagnosed Patients with Acute Myeloid Leukemia (AML) or High-Risk Myelodysplastic Syndrome (MDS), Age 60 or Older   Sponsor:  SWOG  Study ID: [id number]  IRB #: [id number]  Investigator: Stan Garner Initials: F,S     Cycle 1 Day 3   Arm A - Azacitidine (subcutaneous)     Patient presents to clinic prior to administration to C1D3 of Azacitidine. She is awake, alert, and oriented to person place and time. Patient is accompanied by her daughter, Andree. Per patient, port site remains painful but has not had any bleeding. Dressing is dry and intact. After discussion with Dr. Hopkins, she stated that IR said that port can be used when the soreness/pain has resolved, there is no active bleeding, and when the patient and infusion RN are "comfortable with using it". Per MD, would like dressing to be removed "in the next few days." Discussed with patient, stated that she is not ready to remove it today but said that it can be removed tomorrow during her appointment.      Review of AE's  *Please note this list is not all-inclusive, please see AE log for physician reviewed list of adverse events.     Nausea, Grade 1: Patient stated that last night she did have some nausea. Denies vomiting or fever. Took prescribed Zofran, AE ongoing  Pain, Grade 2: Patient states that she took pain medication for the pain at her port site, AE ongoing  Constipation, Grade 1: Patient states that she has not had a bowel movement in "about 5 days." Encouraged her to begin taking Miralax. Verbalized understanding.   Depression, Grade 2: Patient appears tearful today and per daughter "has not wanted to do a lot."  MD has prescribed patient Wellbutrin, per patient is "going to start taking it."    Anemia, Grade 3: Hgb/Hct: 6.9/20.0 respectively. Will receive 2 units of PRBCs today. AE ongoing  Platelets decreased, Grade 4: Plt 6. " "Per Dr. Hopkins, patient will not receive a plt transfusion today, last transfusion 4/9/18, AE ongoing  Neutrophil count decreased, Grade 3: ANC: 0.79, AE worsening from Grade 1.   White blood cells decreased, Grade 2: WBC: 2.4, worsening from Grade 1  Lymphocyte count decreased, Grade 2: Lymphocyte: 0.69, AE ongoing  Hyponatremia, Grade 3: Sodium 129. Discussed with Dr. Hopkins; possibly related to low potassium, ordered PO potassium supplement. No dose modifications during Cycle 1 per protocol. AE worsening from Grade 1. Will continue to monitor.   Hypokalemia, Grade 1: Potassium: 3.2, PO potassium ordered by Dr. Hopkins. Will continue to monitor. AE ongoing  Hypoalbuminemia, Grade 1: Albumin 3.1, will continue to monitor. AE ongoing  Blood bilirubin increased: Total bili: 2.2, will continue to monitor.  AE ongoing.   Fatigue, Grade 2: Patient states that she sleeps from 4 pm until morning and "still feels tired.", AE ongoing.    Provided contact information for research RN, as well as clinic staff for any questions/symptoms. Patient aware of upcoming Vidaza and lab appointments. Will notify with any questions or concerns.    "

## 2018-04-11 NOTE — PLAN OF CARE
Problem: Chemotherapy Effects (Adult)  Goal: Signs and Symptoms of Listed Potential Problems Will be Absent, Minimized or Managed (Chemotherapy Effects)  Signs and symptoms of listed potential problems will be absent, minimized or managed by discharge/transition of care (reference Chemotherapy Effects (Adult) CPG).   Outcome: Ongoing (interventions implemented as appropriate)  Pt here for vidaza D3, hx., meds, allergies reviewed. Awaiting cbc result to see if needs transfusion, pt with c/o generalized achiness, reclined in chair, continue to monitor

## 2018-04-11 NOTE — PLAN OF CARE
Problem: Patient Care Overview  Goal: Plan of Care Review  Outcome: Ongoing (interventions implemented as appropriate)  Pt tolerated vidaza and 2 units prc's without issue, pt to rtc 4/12/18, d/c to home via wheelchair with daughter

## 2018-04-12 NOTE — PROGRESS NOTES
"Thursday April 12th, 2018     Protocol: , A Randomized Phase II/III Trial of Novel Therapeutics Versus Azacitidine in Newly Diagnosed Patients with Acute Myeloid Leukemia (AML) or High-Risk Myelodysplastic Syndrome (MDS), Age 60 or Older   Sponsor:  SWOG  Study ID: [id number]  IRB #: [id number]  Investigator: Sandeep Garner Initials: F,S     Cycle 1 Day 4  Arm A - Azacitidine (subcutaneous)     Patient presents to clinic prior to administration to C1D4 of Azacitidine. She is awake, alert, and oriented to person place and time. Patient is accompanied by her daughter, Andree. Patient has called prior to coming to state that she had a fever last night of "about 101." She denies taking any medication and states that it came back down "on its own." Discussed with Dr. Hopkins, per MD, believes it to be related to her blood transfusion. Okay to receive medication today. Encouraged patient to notify Research RN or on-call oncology if she has fevers of 100.4 or greater tonight. Verbalized understanding. Patient states that she has been "achy all over." Discussed taking Tramadol prior to going to sleep. Verbalized understanding. Infusion RN removed dressing to port. Some inflammation/swelling noted to area, as well as bruising. Small incision noted with dried blood noted. No active bleeding noted to area. Encouraged patient to leave it open to air if possible.     Review of AE's  *Please note this list is not all-inclusive, please see AE log for physician reviewed list of adverse events.     Nausea, Grade 1: Patient does not make any mention of this today. Zofran given prior to Vidaza infusion. PRN Zofran available. Will continue to monitor. AE ongoing  Pain, Grade 2: Patient continues to state that her port site is painful and tender. AE ongoing  Constipation, Grade 1: Patient states that she took a stool softener yesterday, and reports one small bowel movement AE ongoing.   Fatigue, Grade 2: Patient states that she did not sleep " "well last night and she has "no energy" to do anything. AE ongoing.  Fever, Grade 1: see above, will continue to monitor, AE intermittent.     Provided contact information for research RN, as well as clinic staff for any questions/symptoms. Patient aware of upcoming Vidaza and lab appointments. Will notify with any questions or concerns.    "

## 2018-04-12 NOTE — PLAN OF CARE
Problem: Patient Care Overview  Goal: Plan of Care Review  Outcome: Ongoing (interventions implemented as appropriate)  Pt tolerated day 4 Vidaza without complication. Pt reports fever overnight- MD and research RN aware and OK to treat today. PAC dressing removed; site tender and bruised; swollen. Opted to place PIV for treatment today and allow PAC site to heal open to air. PIV left in place for treatment tomorrow; saline locked. VSS; NAD. Discharging via wheelchair.

## 2018-04-13 NOTE — PROGRESS NOTES
"Friday April 13th, 2018     Protocol: , A Randomized Phase II/III Trial of Novel Therapeutics Versus Azacitidine in Newly Diagnosed Patients with Acute Myeloid Leukemia (AML) or High-Risk Myelodysplastic Syndrome (MDS), Age 60 or Older   Sponsor:  [employer]  Study ID: [id number]  IRB #: [id number]  Investigator: Sandeep Garner Initials: F,S     Cycle 1 Day 5  Arm A - Azacitidine (subcutaneous)     Patient presents to clinic prior to administration to C1D5 of Azacitidine.  She is awake, alert, and oriented to person place and time. Patient is accompanied by her daughter, Andree.     Review of AE's  *Please note this list is not all-inclusive, please see AE log for physician reviewed list of adverse events.     Nausea, Grade 1: Patient states that she is having constant nausea. Denies vomiting. Per patient, she does not feel like the Zofran is helping. Discussed with MD, will obtain prior authorization for Emend, in the interim, increase zofran to 8 mg and will prescribe phenergan to be taken at night as needed. Will continue to monitor. AE ongoing  Pain, Grade 1: Patient states that it is "still sore but not as bad". Will continue to monitor.   Myalgia, Grade 1: Patient feels "achy all over." Per patient, she feels like Tylenol helps. Discussed with Dr. Hopkins, who stated that if she is not fevering she can take Tylenol. Encouraged her to take temperature prior to taking Tylenol. Verbalized understanding.   Constipation, Grade 1: Patient states that she takes a daily stool softener but "does not feel like it is helping." Per Dr. Hopkins, take Doculax TID or a stool softener plus Miralax TID until she is more consistent. Patient verbalized understanding. Also educated on not straining while using the restroom and continuing to stay hydrated. Verbalized understanding.   Depression, Grade 2: No mention of this today, will continue to monitor, AE ongoing.   Anemia, Grade 2: Hgb/Hct: 8.5/24.2 respectively. Last transfusion " "4/11/2018. AE intermittent between Grade 2-3. Will continue to monitor  Platelets decreased, Grade 4: Plt 2. Will receive 1 platelet transfusion today.  AE ongoing  Neutrophil count decreased, Grade 3: ANC: 1.086. Improved today, AE intermittent.   White blood cells decreased, Grade 3: WBC: 1.7, worsening from Grade 1-2. Encouraged patient to use proper hand hygiene. Will continue to monitor.   Lymphocyte count decreased, Grade 2: Lymphocyte: 0.69, AE ongoing  Hyponatremia, Grade 1: Sodium 132. AE improved from Grade 3. Will continue to monitor.   Hypokalemia, Grade 1: Potassium: 3.1, Daily PO potassium ordered by Dr. Hopkins. Will continue to monitor. AE ongoing  Hypoalbuminemia, Grade 1: Albumin 3.0, will continue to monitor. AE ongoing  Blood bilirubin increased, Grade 2: Total bili: 2.6, will continue to monitor.  AE ongoing.   Fatigue, Grade 2: Patient states that she sleeps about 14 hours daily and is "still tired",  AE ongoing.  Fever, Grade 1: Patient states that immediately upon waking she had a fever of 100.4, however, upon re-checking it had "come back down to normal." Denies taking any PRN tylenol. Per Dr. Hopkins, does not feel that fevers are infectious.  Will continue to monitor.     Patient tolerated infusion. See Infusion RN note. Provided contact information for research RN, as well as clinic staff for any questions/symptoms. Patient aware of upcoming Vidaza and lab appointments. Will notify with any questions or concerns.    "

## 2018-04-13 NOTE — PLAN OF CARE
Problem: Chemotherapy Effects (Adult)  Goal: Signs and Symptoms of Listed Potential Problems Will be Absent, Minimized or Managed (Chemotherapy Effects)  Signs and symptoms of listed potential problems will be absent, minimized or managed by discharge/transition of care (reference Chemotherapy Effects (Adult) CPG).   Outcome: Ongoing (interventions implemented as appropriate)  Pt here for Vidaza infusion, 1 unit platelets, accompanied by daughter, c/o continued nausea, no vomiting, also elevated temps in evening (last p.m. 100.4, now 99.3), continued constipation which pt feels is caused by oral Zofran, extended periods of sleep (14+ hours); reports RSPAC placed prior Thursday with bleeding from site x 3 days following, seen in ER for same, now resolved and PAC healing and not in use at present; Research RN's JOHN Strickland RN and SUPRIYA Craig RN chairside to discuss pt issues (will request new home nausea med from MD Hopkins, also start taking Miralax daily); discussed lab values and platelet level (2), discussed treatment plan for today, all pt questions answered and pt agrees to proceed

## 2018-04-16 NOTE — PLAN OF CARE
Problem: Patient Care Overview  Goal: Plan of Care Review  Outcome: Ongoing (interventions implemented as appropriate)  Report received from JULIETH Lynne. Patient completed 2nd unit of prbcs. PIV removed, catheter tip intact. Plan to rtc tomorrow. Verbalized understanding to call MD for any questions/concerns. Discharged home, escorted in wheelchair by daughter.

## 2018-04-16 NOTE — PROGRESS NOTES
"Monday April 16th, 2018     Protocol: , A Randomized Phase II/III Trial of Novel Therapeutics Versus Azacitidine in Newly Diagnosed Patients with Acute Myeloid Leukemia (AML) or High-Risk Myelodysplastic Syndrome (MDS), Age 60 or Older   Sponsor:  [facility] [place], Study ID: [id number]  IRB #: 2018.019N  Investigator: Sandeep Garner Initials: F,S     Cycle 1 Day 8 (dose 6 of Azacitidine)   Arm A - Azacitidine (subcutaneous)     Patient presents to clinic prior to administration to C1D8 (dose 6) of Azacitidine.  She is awake, alert, and oriented to person place and time. Patient is accompanied by her daughter, Andree.     Review of AE's  *Please note this list is not all-inclusive, please see AE log for physician reviewed list of adverse events.     Nausea, Grade 1: Patient states that she is having constant nausea. Denies vomiting. Per patient, she has increased the dose of Zofran to 8 mg as prescribed but it is not helping. Phenergan was not filled by pharmacy, so unable to take it over the weekend. Will order phenergan today. Discussed Emend and the co-pay associated with it - patient does not feel like she can financially afford to take this medication but "will think about."  Will continue to monitor. AE ongoing  Pain, Grade 1: Patient states that her port sort is painful/sore with movement/palpation. Per patient, she does not feel like it is ready to be used. Will continue to monitor. AE ongoing.  Myalgia, Grade 1: Patient says that the this has improved somewhat from last week, and only took one dose of Tylenol this weekend to help with the "achyness" AE ongoing.    Constipation, Grade 1: Started taking Doculax and states that she "went to the bathroom all night." Will continue to take stool softener and/or Miralax as needed, AE intermittent.   Anemia, Grade 3: Hgb/Hct: 6.4/18.1 respectively. AE intermittent between Grade 2-3. Will continue to monitor. Will receive 2 units PRBCs today.   Platelets decreased, Grade 4: Plt 1. " "Will receive a unit of platelets today.  AE ongoing  Neutrophil count decreased, Grade 3: ANC:  0.82 AE ongoing  White blood cells decreased, Grade 3: WBC: 1.23, AE ongoing  Lymphocyte count decreased, Grade 3: Lymphocyte: 0.32, AE ongoing  Hyponatremia, Grade 1: Sodium 131. Will continue to monitor, AE ongoing  Hypokalemia, Grade 1: Potassium: 3.6, improved. Patient takes daily potassium supplement.  Will continue to monitor. AE ongoing  Hypoalbuminemia, Grade 2: Albumin 2.6, will continue to monitor. AE ongoing between 1-2.   Blood bilirubin increased, Grade 2: Total bili: 2.0, will continue to monitor.  AE ongoing.   Fatigue, Grade 2: Patient states that all she wants to do is sleep,  AE ongoing.  Fever, Grade 1: Patient denies fever over the weekend, AE intermittent.   Productive cough, Grade 1: Patient states that she "always has a cough with phlegm." States that when she coughs, she notes blood-tinged sputum. Discussed with MD, will receive a unit of platelets today.     Patient tolerated infusion. See Infusion RN note. Provided contact information for research RN, as well as clinic staff for any questions/symptoms. Patient aware of upcoming Vidaza and lab appointments. Will notify with any questions or concerns.    "

## 2018-04-16 NOTE — NURSING
[id number]  Transfer of pt care to LILY Arora RN , PRBC unit no. 2 infusing w/o difficulty, pt tolerating well; pt has no needs or concerns at present; discussed remaining well hydrated, contacting MD for any needs or concerns, verbally reviewed post-transfusion instructions (pt previously received printed handout of same); printed AVS given to and reviewed with pt, pt verbalized understanding of all discussed and when to report next

## 2018-04-16 NOTE — TELEPHONE ENCOUNTER
Per Dr. Hopkins: would we be able to get her in for blood and platelets today? if not can we add it onto treatment tomorrow?    Will forward to  to see if this is possible.

## 2018-04-16 NOTE — PLAN OF CARE
Problem: Anemia (Adult)  Goal: Symptom Improvement  Patient will demonstrate the desired outcomes by discharge/transition of care.   Outcome: Ongoing (interventions implemented as appropriate)  Pt here for Vidaza infusion, possible blood products, no new complaints or concerns; awaiting lab results, all pt questions answered

## 2018-04-17 NOTE — PROGRESS NOTES
"  Tuesday, April 17th, 2018     Protocol: , A Randomized Phase II/III Trial of Novel Therapeutics Versus Azacitidine in Newly Diagnosed Patients with Acute Myeloid Leukemia (AML) or High-Risk Myelodysplastic Syndrome (MDS), Age 60 or Older   Sponsor:  [facility] [place], Study ID: [id number]  IRB #: 2018.019N  Investigator: Sandeep Garner Initials: F,S     Cycle 1 Day 9 (dose 7 of Azacitidine)   Arm A - Azacitidine (IV)     Patient presents to clinic prior to administration to C1D9 (dose 7) of Azacitidine.  She is awake, alert, and oriented to person place and time. Patient is accompanied by her daughter, Andree. Patient reports starting phenergan last night for nausea, grade 1 with positive results.  She has been taking every 6 hours as directed.  She states it is making her a little drowsy but she is still able to perform activity as per her usual practice.  She states her port site continues to be "very sore" and requests that it not be used today.  Infusion RN obtained peripheral IV access for today's chemo.      She and her daughter have concerns today regarding patient continuing to cough up blood-tinged sputum.  She states it is "worse" and that she has been coughing up blood (described as clots) during the night and it "scares me," productive cough (with hemoptysis, grade 1).  They would like for her to receive a platelet transfusion today.  Patient is s/p 2U PRBC's and 1U platelets yesterday, 4/17.  Request relayed to Dr. Hopkins, who agrees that patient may be transfused with 1U platelets today along with tylenol in light of previous issues with post-transfusion fevers, orders forthcoming per MD.      See yesterday's (4/16 note) for full list of AE's and gradings.  No blood work indicated today.  No changes in other AE's noted.    MD states because patient received PRBC's + platelets yesterday, 4/16 and will receive 1U platelets today, 4/17, she does not need to report for blood work tomorrow, 4/18, but leaves the final " decision up to patient.  Patient and daughter to discuss and let Research RN's know. Will follow.

## 2018-04-17 NOTE — PLAN OF CARE
Problem: Chemotherapy Effects (Adult)  Goal: Signs and Symptoms of Listed Potential Problems Will be Absent, Minimized or Managed (Chemotherapy Effects)  Signs and symptoms of listed potential problems will be absent, minimized or managed by discharge/transition of care (reference Chemotherapy Effects (Adult) CPG).   Outcome: Ongoing (interventions implemented as appropriate)  Patient here for D2 Vidaza infusion.  Also receiving one unit of plts today.  Assessment complete and labs reviewed.  Patient c/o coughing up copious amounts of blood last night; MD aware and plts to be transfused.  VSS.  No needs expressed at this time.  Will continue to monitor.

## 2018-04-17 NOTE — PLAN OF CARE
Problem: Patient Care Overview  Goal: Plan of Care Review  Outcome: Ongoing (interventions implemented as appropriate)  Patient tolerated transfusion and Vidaza well.  No reaction suspected.  VSS.  Patient informed when to seek medical attention; verbalized understanding.  No questions or concerns.  AVS given to patient.  Patient left unit in WC accompanied by her daughter.

## 2018-04-18 NOTE — TELEPHONE ENCOUNTER
Spoke with Ms. Olivas regarding her siblings and getting them HLA typed.  Patient hasn't really spoken with them regarding being a stem cell donor.  She will contact them and call us back.  Eulogio MPH, MT (ASCP)

## 2018-04-20 NOTE — PROGRESS NOTES
"  Friday, April 20th, 2018     Protocol: , A Randomized Phase II/III Trial of Novel Therapeutics Versus Azacitidine in Newly Diagnosed Patients with Acute Myeloid Leukemia (AML) or High-Risk Myelodysplastic Syndrome (MDS), Age 60 or Older   Sponsor:  [facility] [place], Study ID: [id number]  IRB #: 2018.019N  Investigator: Sandeep Garner Initials: F,S     Cycle 1 Day 12   Arm A - Azacitidine (IV)     Patient presents to clinic for blood work and potential transfusion.  She is awake, alert, and oriented to person place and time. Patient is accompanied by her son, Marcial. Patient reports continued control of nausea with phenergan, nausea, grade 1.  Patient with H&H of 6.7/19.0, anemia grade 3; platelet count of 1000, platelet count decreased, grade 4.  White blood cells 0.75, white blood cells decreased, grade 4 and neutrophil count of 300, neutrophil count decreased, grade 4.  Patient to receive 1U platelets and 2U PRBC's with tylenol/benadryl pre-meds per Dr. Hopkins.      She requests that her port site not be used today since it is "still very sore."  Infusion RN obtained peripheral IV access for today's blood products.      Patient is also stating that she "is not really eating" because she does not have an appetite.  Anorexia, grade 2.  She was instructed that nutrition is very important right now and was advised to try Ensure/Boost/Enlive supplement drinks if she does not feel like she has an appetite for solid foods.   She also states she is not taking miralax daily as advised because she feels like she has no stool to pass since she is not eating.      Patient continues to have  productive cough (with hemoptysis, grade 1) but states it has not worsened.   Today patient is also reporting new onset of passing a blood clot vaginally. Vaginal hemorrhage, grade 1.  She reports no significant bleeding prior to or following this.  This was reported to Dr. Hopkins who states this is expected, but patient should report recurrence or " worsening of this symptom.  Patient was also reminded to report fevers and other concerning issues and states having [facility] main line number.    No changes in other AE's noted.    Patient states understanding of Monday, 4/23 appts for labs, possible transfusion, and visit with Dr. Hopkins.  Will follow.

## 2018-04-20 NOTE — PLAN OF CARE
Problem: Patient Care Overview  Goal: Plan of Care Review  Outcome: Ongoing (interventions implemented as appropriate)  Patient tolerated transfusions well today, feeling very tired now.  VSS, NAD noted.  Peripheral IV removed intact.  Pt advised to call MD for any issues.  Released to home in stable condition.

## 2018-04-20 NOTE — NURSING
1012:  Pt arrived at [facility]fusion [facility], VSS, assessment completed.  #22 gauge PIV placed to right wrist, flushes easily, brisk blood return observed.  CBC results noted.  Plan to administer 2 units PRBC's and Platelets today.  Will monitor.

## 2018-04-23 NOTE — PLAN OF CARE
Problem: Patient Care Overview  Goal: Plan of Care Review  4038-Patient tolerated 2 unit PRBCs well. Discharged without complaints or S/S of adverse event. AVS given. Outpatient blood transfusion reaction handout given and educated on s/s of delayed reaction.  Instructed to call provider for any questions or concerns. Verbalized understanding.

## 2018-04-23 NOTE — PLAN OF CARE
Problem: Patient Care Overview  Goal: Individualization & Mutuality  PIV   3 pillows   Outcome: Ongoing (interventions implemented as appropriate)  1100-Labs , hx, and medications reviewed, patient is here for blood today. Assessment completed. Discussed plan of care with patient. Patient in agreement. Chair reclined and warm blanket and snack offered.

## 2018-04-23 NOTE — PROGRESS NOTES
Hematology and Medical Oncology   Follow Up Visit     04/25/2018    Primary Hematologic Diagnosis: High Grade MDS --Enrolled in clinical trial [facility] 1612    History of Present Ilness:   Brooke Olivas (Brooke) is a pleasant 63 y.o.female who recently transferred care to [facility] from [facility].    Hematology History:  --Began noticing bruising around January 28th, 2018 on her extremities and  presented to the PCP on February 12th for evaluation with blood work.  The patient states she was found to have platelets in the 25k range.    --Bone marrow biopsy on 3/08/18 showed MDS with 7% blasts in the BM marrow. --On first lab check at [facility] she was noted to have anemia with hemoglobin of  ~5 and plt count of 7k.  --[place] [facility] [place] for refractory pancytopenia. 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 12 % Blasts. 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 In addition, three copies of [id number] (at 7q31) was observed in 56% of nuclei.  --Enrolled in clinical trial [facility] 1612 Cycle 1 Day1: 4/9/18    Interval History:  Ms Olivas was seen today in the infusion center for follow up after Cycle 1 of vidaza on [facility] 1612. She is beginning to feel better. The swelling and hematoma surrounding her port has subsided. The bruising on her upper extremities has subsided with no new bruises evident. She does continue to have mild oral mucosal bleeding. While she overall still feels fatigued. She is beginning to have more energy, is eating and napping less.    She tolerated vidaza with some nausea, which was relieved by multiple anti nausea meds.     PAST MEDICAL HISTORY:   Past Medical History:   Diagnosis Date    Essential hypertension 3/17/2018    Mild asthma 3/17/2018    Smoker        PAST SURGICAL HISTORY:    Past Surgical History:   Procedure Laterality Date    PORTACATH PLACEMENT      TUBAL LIGATION         PAST SOCIAL HISTORY:   reports that she quit smoking about 4 weeks ago. Her smoking use included Cigarettes. She has a 60.00 pack-year smoking history. She has never used smokeless tobacco. She reports that she does not drink alcohol or use drugs..    FAMILY HISTORY:  No family history on file.    CURRENT MEDICATIONS:   Current Outpatient Prescriptions   Medication Sig    acetaminophen (TYLENOL) 325 MG tablet Take 325 mg by mouth 2 (two) times daily.    albuterol (VENTOLIN HFA) 90 mcg/actuation inhaler Inhale 1 puff into the lungs every 6 (six) hours as needed for Wheezing or Shortness of Breath. Rescue    aminocaproic acid 1,000 mg Tab Take 2,000 mg by mouth every 8 (eight) hours.    aprepitant (EMEND) 40 MG capsule Take 2 capsules (80 mg total) by mouth once daily.    buPROPion (WELLBUTRIN) 100 MG tablet Take 1 tablet (100 mg total) by mouth 2 (two) times daily.    ondansetron (ZOFRAN-ODT) 4 MG TbDL Take 1 tablet (4 mg total) by mouth every 8 (eight) hours as needed. Take prior to Azacitidine and as needed for nausea/vomiting.    potassium chloride (MICRO-K) 10 MEQ CpSR Take 4 capsules (40 mEq total) by mouth once daily.     Current Facility-Administered Medications   Medication    potassium bicarbonate & potassium chloride disintegrating tablet 50 mEq     Facility-Administered Medications Ordered in Other Visits   Medication    0.9%  NaCl infusion (for blood administration)    0.9%  NaCl infusion (for blood administration)     ALLERGIES:   Review of patient's allergies indicates:   Allergen Reactions    Penicillins     Sulfa (sulfonamide antibiotics)          Review of Systems:     Review of Systems   Constitutional: Positive for fatigue and unexpected weight change. Negative for appetite change, chills, diaphoresis and fever.   HENT:   Negative for hearing loss, mouth sores, nosebleeds, sore  throat, trouble swallowing and voice change.    Eyes: Negative for eye problems and icterus.   Respiratory: Negative for chest tightness, cough, hemoptysis, shortness of breath and wheezing.    Cardiovascular: Negative for chest pain, leg swelling and palpitations.   Gastrointestinal: Negative for abdominal distention, abdominal pain, blood in stool, diarrhea, nausea and vomiting.   Endocrine: Negative for hot flashes.   Genitourinary: Negative for bladder incontinence, difficulty urinating, dysuria and hematuria.    Musculoskeletal: Negative for arthralgias, back pain, flank pain, gait problem, myalgias, neck pain and neck stiffness.   Skin: Negative for itching, rash and wound.   Neurological: Negative for dizziness, extremity weakness, gait problem, headaches, numbness, seizures and speech difficulty.   Hematological: Negative for adenopathy. Bruises/bleeds easily.   Psychiatric/Behavioral: Negative for confusion, depression and sleep disturbance. The patient is not nervous/anxious.         Physical Exam:     There were no vitals filed for this visit.    Physical Exam   Constitutional: She is oriented to person, place, and time. She appears well-developed and well-nourished. No distress.   HENT:   Head: Normocephalic and atraumatic.   Mouth/Throat: Oropharynx is clear and moist. No oropharyngeal exudate.   Eyes: Conjunctivae and EOM are normal. Pupils are equal, round, and reactive to light.   Neck: Normal range of motion. Neck supple. No JVD present. No tracheal deviation present. No thyromegaly present.   Cardiovascular: Normal rate, regular rhythm and normal heart sounds.  Exam reveals no friction rub.    No murmur heard.  Pulmonary/Chest: Effort normal and breath sounds normal. No stridor. No respiratory distress. She has no wheezes. She has no rales. She exhibits no tenderness.   Abdominal: Soft. Bowel sounds are normal. She exhibits no distension. There is no tenderness. There is no rebound and no guarding.    Musculoskeletal: Normal range of motion. She exhibits no edema, tenderness or deformity.   Lymphadenopathy:     She has no axillary adenopathy.   Neurological: She is alert and oriented to person, place, and time. She displays normal reflexes. No cranial nerve deficit or sensory deficit. She exhibits normal muscle tone. Coordination normal.   Skin: Skin is warm and dry. Capillary refill takes less than 2 seconds. No rash noted. She is not diaphoretic. No erythema. No pallor.   Resolving exchymosis   Psychiatric: She has a normal mood and affect. Her behavior is normal. Judgment and thought content normal.       ECOG Performance Status: (foot note - ECOG PS provided by Eastern Cooperative Oncology Group) 1 - Symptomatic but completely ambulatory    Karnofsky Performance Score:  80%- Normal Activity with Effort: Some Symptoms of Disease    Labs:   Lab Results   Component Value Date    WBC 0.48 (LL) 04/23/2018    HGB 6.7 (L) 04/23/2018    HCT 19.6 (LL) 04/23/2018    PLT 1 (LL) 04/23/2018    ALT 89 (H) 04/23/2018    AST 80 (H) 04/23/2018     04/23/2018    K 3.6 04/23/2018     04/23/2018    CREATININE 0.6 04/23/2018    BUN 15 04/23/2018    CO2 23 04/23/2018    INR 1.1 04/07/2018       Imaging:  Previous imaging has been reviewed    Assessment and Plan:     Ms. Olivas is a pleasant 62 year old female with newly diagnosed high grade MDS    MDS (myelodysplastic syndrome) with 5q deletion  --12% blasts on the most recent bone marrow biopsy  --S/p cycle 1 of  trial for MDS/AML. She was randomized to the Azacitadine arm  --Plan for bone marrow in the future as per study guidelines    Anemia  --Transfusion dependent --will receive 2 units today given significant hct drop following port placement  --Continue twice a week labs with transfusion PRN  --2 Antibodies have been identified    Thrombocytopenia  --Refractory to transfusions  --Continue on amicar --- refill given today  --We discussed the signs and  symptoms of a major bleed including headache, blurry vision and signs of bleeding that should have him present to the Emergency Room for immediate attention    Supportive Care  --Will await healing prior to using the port for more reliable IV access  --Diagnosis associated depression --- will start Wellbutrin    Severe Neutropenia  --ANC below 500 today  --Will initiate cipro, fluconazole and acyclovir prophylaxis    Hypertension   --Managed per PCP  --Continue home medication    30 minutes were spent face to face with the patient and her  family to discuss the disease, natural history, treatment options and survival statistics. I have provided the patient with an opportunity to ask questions and have all questions answered to her satisfaction.       she will return to clinic in roughly 2 weeks prior to cycle 2, but knows to call in the interim if symptoms change or should a problem arise.        Stephanie Hopkins MD  Hematology and Medical Oncology  Bone Marrow Transplant  [facility]

## 2018-04-23 NOTE — PROGRESS NOTES
"Monday April 23rd, 2018     Protocol: , A Randomized Phase II/III Trial of Novel Therapeutics Versus Azacitidine in Newly Diagnosed Patients with Acute Myeloid Leukemia (AML) or High-Risk Myelodysplastic Syndrome (MDS), Age 60 or Older   Sponsor:  [employer], Study ID: [id number]  IRB #: 2018.019N  Investigator: Sandeep Garner Initials: F,S     Cycle 1 Day 15  Arm A - Azacitidine (subcutaneous)     Patient presents to to [facility] for labs and MD visit. She is accompanied by her daughter, Andree. She is awake, alert, and oriented to person place and time. Dr. Hopkins saw patient on infusion unit, see MD note.  Patient asked MD if she could receive blood products at a center closer to home; per study she would have to receive treatment at [facility] ([facility]) but could potentially get labs/infusions at another facility. Patient verbalized understanding. Per patient she has a "better weekend," and feels that now that she is not receiving the Vidaza she has "a little more energy." Patient verbalizes continued willingness to participate in above-mentioned trial at this time.     Review of AE's  *Please note this list is not all-inclusive, please see AE log for physician reviewed list of adverse events.     Nausea, Grade 1: Patient states that the phenergan has helped with her nausea, takes PRN. AE ongoing   Anemia, Grade 3: Hgb/Hct: 6.7/19.6 respectively. Will continue to monitor. Will receive 2 units PRBCs today, AE ongoing  Platelets decreased, Grade 4: Plt 1. Per MD, will not receive platelets today.   AE ongoing  Neutrophil count decreased, Grade 3: ANC: 0.04, Per Dr. Hopkins, patient will begin prophylactic anti-viral, anti-fungal, and antibiotic, AE ongoing  White blood cells decreased, Grade 3: WBC: 0.48, AE ongoing  Lymphocyte count decreased, Grade 3: Lymphocyte: 0.32, AE ongoing  Hyponatremia, Grade 1: Sodium 137. Will continue to monitor, AE resolved  Hypoalbuminemia, Grade 2: Albumin 2.6, will continue to " "monitor. AE ongoing  Blood bilirubin increased, Grade 2: Total bili: 2.0, will continue to monitor.  AE ongoing.   Fatigue, Grade 2: Patient states that all she wants to do is sleep,  AE ongoing.  Productive cough,  (with hemoptysis, grade 1)Grade 1: Patient continues to note blood-tinged sputum. Patient states that she goes through "about half a box of kleenex a night." AE ongoing  Anorexia, grade 2: Patient has started drinking Ensure, continues to state that she does not have any appetite, AE ongoing.   Vaginal hemorrhage, grade 1: Per patient, she did not pass any clots this weekend, AE resolved. Will continue to monitor.     Patient aware of upcoming lab appointments. Verbalized understanding. Patient states having contact information for research RN, as well as clinic staff for any questions/symptoms.  "

## 2018-04-24 PROBLEM — G93.6 CEREBELLAR EDEMA: Status: ACTIVE | Noted: 2018-01-01

## 2018-04-24 PROBLEM — I60.9 SAH (SUBARACHNOID HEMORRHAGE): Status: ACTIVE | Noted: 2018-01-01

## 2018-04-24 PROBLEM — Z29.89 SEIZURE PROPHYLAXIS: Status: ACTIVE | Noted: 2018-01-01

## 2018-04-25 NOTE — ASSESSMENT & PLAN NOTE
Currently on azacitadine (drug trial); gets pRBC transfusions q2-3 days  Plt count 1 on arrival, with H/H 6.7/19.6, WBL 0.48  Management per [facility]

## 2018-04-25 NOTE — SIGNIFICANT EVENT
Called to BS with reports of asystole shortly after palliative extubation.  No heart sounds.  No breath sounds.  No pulse.  Pupils fixed and dilated.     TOD 5:01am.    Negra Pa MD  [facility]  PGY-3

## 2018-04-25 NOTE — HPI
63 year old female presents with worst HA of life. Pt found to have SAH and was subsequently intubated for worsening AMS. CTA showed verterbrobasilar aneurysm. Patient currently GCS3T without brainstem reflexes.

## 2018-04-25 NOTE — SUBJECTIVE & OBJECTIVE
"Facility-Administered Medications Prior to Admission   Medication Dose Route Frequency Provider Last Rate Last Dose    potassium bicarbonate & potassium chloride disintegrating tablet 50 mEq  50 mEq Oral 1 time in Clinic/HOD Stephanie Hopkins MD         Prescriptions Prior to Admission   Medication Sig Dispense Refill Last Dose    acetaminophen (TYLENOL) 325 MG tablet Take 325 mg by mouth 2 (two) times daily.   Taking    acyclovir (ZOVIRAX) 400 MG tablet Take 1 tablet (400 mg total) by mouth 2 (two) times daily. 60 tablet 3     albuterol (VENTOLIN HFA) 90 mcg/actuation inhaler Inhale 1 puff into the lungs every 6 (six) hours as needed for Wheezing or Shortness of Breath. Rescue   Taking    aminocaproic acid 1,000 mg Tab Take 2,000 mg by mouth every 8 (eight) hours. 180 each 3     aprepitant (EMEND) 40 MG capsule Take 2 capsules (80 mg total) by mouth once daily. 60 capsule 0     buPROPion (WELLBUTRIN) 100 MG tablet Take 1 tablet (100 mg total) by mouth 2 (two) times daily. 60 tablet 11     ciprofloxacin HCl (CIPRO) 500 MG tablet Take 1 tablet (500 mg total) by mouth every 12 (twelve) hours. 60 tablet 3     fluconazole (DIFLUCAN) 200 MG Tab Take 2 tablets (400 mg total) by mouth once daily. 60 tablet 3     ondansetron (ZOFRAN-ODT) 4 MG TbDL Take 1 tablet (4 mg total) by mouth every 8 (eight) hours as needed. Take prior to Azacitidine and as needed for nausea/vomiting. 30 tablet 5 Taking    potassium chloride (MICRO-K) 10 MEQ CpSR Take 4 capsules (40 mEq total) by mouth once daily. 120 capsule 0     promethazine (PHENERGAN) 12.5 MG Tab Take 1 tablet (12.5 mg total) by mouth 4 (four) times daily. 60 tablet 3        Review of patient's allergies indicates:   Allergen Reactions    Cephalosporins Hives    Compazine [prochlorperazine]      Patient says that she "looses control of her jaw"     Penicillins     Sulfa (sulfonamide antibiotics)        Past Medical History:   Diagnosis Date    Essential " hypertension 3/17/2018    Mild asthma 3/17/2018    Smoker      Past Surgical History:   Procedure Laterality Date    PORTACATH PLACEMENT      TUBAL LIGATION       Family History     None        Social History Main Topics    Smoking status: Former Smoker     Packs/day: 1.50     Years: 40.00     Types: Cigarettes     Quit date: 3/23/2018    Smokeless tobacco: Never Used    Alcohol use No    Drug use: No    Sexual activity: Not Currently     Review of Systems   Unable to perform ROS: Intubated     Objective:     Weight: 72.6 kg (160 lb)  Body mass index is 27.46 kg/m².  Vital Signs (Most Recent):  Temp: 99.4 °F (37.4 °C) (04/24/18 2000)  Pulse: 73 (04/24/18 2147)  Resp: 16 (04/24/18 2147)  BP: (!) 138/56 (04/24/18 2036)  SpO2: 100 % (04/24/18 2147) Vital Signs (24h Range):  Temp:  [99.4 °F (37.4 °C)] 99.4 °F (37.4 °C)  Pulse:  [72-96] 73  Resp:  [16-18] 16  SpO2:  [100 %] 100 %  BP: (138-181)/() 138/56  Arterial Line BP: (129)/(43) 129/43                 Vent Mode: A/C  Oxygen Concentration (%):  [50] 50  Resp Rate Total:  [16 br/min] 16 br/min  Vt Set:  [450 mL] 450 mL  PEEP/CPAP:  [5 cmH20] 5 cmH20  Pressure Support:  [0 cmH20-10 cmH20] 0 cmH20  Mean Airway Pressure:  [8.5 cmH20-8.6 cmH20] 8.5 cmH20         Urethral Catheter 04/24/18 2042 Straight-tip;Non-latex (Active)       Physical Exam:    Constitutional: She appears well-developed and well-nourished.     Eyes: Right eye exhibits no discharge. Left eye exhibits no discharge.   Fundoscopic exam:       The right eye shows no hemorrhage.        The left eye shows no hemorrhage.   No scleral icterus.     Cardiovascular: Normal rate, regular rhythm and normal pulses.     Abdominal: Soft. Bowel sounds are normal.     Skin: Skin displays no rash on trunk and no rash on extremities.     Musculoskeletal:   Appropriate tone, no evidence of spasm      Neurological:   GCS [id number]  No brainstem reflexes  No mvmt to painful stimuli       Significant  Labs:    Recent Labs  Lab 04/23/18  0907 04/24/18 2025   * 180*    132*   K 3.6 3.7    101   CO2 23 14*   BUN 15 16   CREATININE 0.6 0.7   CALCIUM 8.7 9.0       Recent Labs  Lab 04/23/18  0907 04/24/18 2025   WBC 0.48* 0.97*   HGB 6.7* 8.9*   HCT 19.6* 25.2*   PLT 1* 2*       Recent Labs  Lab 04/24/18 2025   INR 1.1   APTT 23.5     Microbiology Results (last 7 days)     ** No results found for the last 168 hours. **        All pertinent labs from the last 24 hours have been reviewed.    Significant Diagnostics:  CT: Ct Head Without Contrast    Result Date: 4/24/2018  Acute subarachnoid hemorrhage with interventricular extension throughout the subarachnoid basilar cisterns, sylvian fissures and extending into the right insular sulcus and ventricular system.  Recommend neurosurgical consultation. Lateral ventricular prominence greater than expected given this patient's age and level of generalized cerebral volume loss.  Developing obstructive hydrocephalus cannot be excluded. COMMUNICATION This critical result was discovered/received at 2025.  The critical information above was relayed directly by me by telephone to John Shelton MD on 04/24/2018 at 2030. Electronically signed by resident: Alvin Arroyo Date:    04/24/2018 Time:    20:33 Electronically signed by: Andrey Fuentes MD Date:    04/24/2018 Time:    21:10

## 2018-04-25 NOTE — H&P
[facility]  Neurocritical Care  H&P    Admit Date: 4/24/2018  Service Date: 04/24/2018  Length of Stay: 0    Subjective:     Chief Complaint: Nontraumatic subarachnoid hemorrhage    History of Present Illness: Brooke is a 63 y.o. female with a PMH of HTN, smoker, thrombocytopenia, and Myelodysplastic Syndrome who presents to [facility] wit a SAH s/p intubation in the ED. She was witnessed by family having had some head discomfort and crossing of the eyes. She had decreased responsiveness en route to the hospital. By the time she arrived here, pt minimally responsive and showed reduced ventilation effort while on the CT table.She is being admitted to [facility] for a higher level of care.     Hospital Course: 4/24: admit [facility] CTH, CTA: 0.3 x 0.3 cm anterior projecting saccular aneurysm at the distal left vertebral artery.  Consider cerebral angiography for further evaluation.  Occlusion of the left internal carotid artery with a small amount of distal retrograde filling.  Likely hemodynamically significant stenosis at the proximal right internal carotid artery.  Redemonstration of acute subarachnoid hemorrhage throughout the subarachnoid basilar cisterns, sylvian fissures, and right insular sulcus with extension into the ventricular system.  Volume of subarachnoid hemorrhage appears to have increased slightly.  Otherwise, findings are similar when compared to recent prior examination.  Recommend continued follow-up.  Possible developing obstructive hydrocephalus, as previously noted.  Patchy pulmonary consolidation and scattered ground-glass attenuation which may be infectious or inflammatory in nature.     Interval History:  Admit NCC     Review of Systems   Unable to obtain a complete ROS due to level of consciousness.  Objective:     Vitals:  Temp: 99.4 °F (37.4 °C)  Pulse: 72  BP: (!) 138/56  MAP (mmHg): 90  Resp: 20  SpO2: 100 %  Oxygen Concentration (%): 50  O2 Device (Oxygen Therapy): ventilator  Vent  Mode: A/C  Set Rate: 16 bmp  Vt Set: 450 mL  Pressure Support: 0 cmH20  PEEP/CPAP: 5 cmH20  Peak Airway Pressure: 21 cmH2O  Mean Airway Pressure: 8.5 cmH20  Plateau Pressure: 0 cmH20    Temp  Min: 99.4 °F (37.4 °C)  Max: 99.4 °F (37.4 °C)  Pulse  Min: 72  Max: 96  BP  Min: 138/56  Max: 181/106  MAP (mmHg)  Min: 90  Max: 109  Resp  Min: 16  Max: 20  SpO2  Min: 100 %  Max: 100 %  Oxygen Concentration (%)  Min: 50  Max: 50    No intake/output data recorded.           Physical Exam      Physical Exam:  GA: unresponsive, comfortable, no acute distress.   HEENT: No scleral icterus or JVD.   Pulmonary: Clear to auscultation A/P/L.   Cardiac: RRR S1 & S2 w/o rubs/murmurs/gallops.   Abdominal: Bowel sounds present x 4.   Skin: No jaundice, rashes, or visible lesions.  Neuro:  --GCS: E1 V1 M1  --Mental Status:  Unresponsive to all stimuli   --CN II-XII grossly intact.   --Pupils 4mm, nonreactive.   --Corneal reflex, gag, cough absent   -- No movement to stimuli       Unable to test orientation, language, memory, judgment, insight, fund of knowledge, hearing, shoulder shrug, tongue protrusion, coordination, gait due to level of consciousness.    Medications:  Continuous  nicardipine    propofol Last Rate: Stopped (04/24/18 2100)   Scheduled  levetiracetam IVPB 1,000 mg Once   [START ON 4/25/2018] levETIRAcetam in NaCl (iso-os) 500 mg Q12H   lidocaine-EPINEPHrine 1%-1:100,000 20 mL Once   niMODipine 60 mg Q4H   senna-docusate 8.6-50 mg 1 tablet BID   sodium chloride 0.9% 3 mL Q8H   vancomycin (VANCOCIN) IVPB 1,000 mg Q12H   PRN  sodium chloride  Q24H PRN   sodium chloride  Q24H PRN   sodium chloride  Q24H PRN   sodium chloride  Q24H PRN   sodium chloride  Q24H PRN   acetaminophen 650 mg Q6H PRN   labetalol 10 mg Q4H PRN   ondansetron 8 mg Q8H PRN     Today I personally reviewed pertinent medications, lines/drains/airways, imaging, lab results,     Diet  Diet NPO  Diet NPO        Assessment/Plan:     Neuro   * Nontraumatic  subarachnoid hemorrhage    - NSGY following   - CTA: 0.3 x 0.3 cm anterior projecting saccular aneurysm at the distal left vertebral artery.  Consider cerebral angiography for further evaluation. Occlusion of the left internal carotid artery with a small amount of distal retrograde filling. Likely hemodynamically significant stenosis at the proximal right internal carotid artery. Redemonstration of acute subarachnoid hemorrhage throughout the subarachnoid basilar cisterns, sylvian fissures, and right insular sulcus with extension into the ventricular system.  Volume of subarachnoid hemorrhage appears to have increased slightly.  Otherwise, findings are similar when compared to recent prior examination.  Recommend continued follow-up. Possible developing obstructive hydrocephalus, as previously noted.   - SBP < 140   - Transfuse plt  - TCD's   - Nimodipine   - Keppra 1 G then 500 Q 12  - Family discussion held on goals of care by Dr Lemos pending family decision           Cerebellar edema    - Mannitol 75  - HOB elevated         Seizure prophylaxis    - Keppra 1G then 500 q12        Cardiac/Vascular   Essential hypertension    - SBP < 140  - cardene gtt        Hematology   Thrombocytopenia, unspecified    - Transfuse PLT x 3             Prophylaxis:  Venous Thromboembolism: mechanical  Stress Ulcer: H2B  Ventilator Pneumonia: yes     Activity Orders          None        Full Code       65 mi  Critical care time    Darrel Hawkins NP  Neurocritical Care  [facility]

## 2018-04-25 NOTE — ED TRIAGE NOTES
Pt wheeled from [facility] to RM 19. Dr Shelton at bedside requesting code cart and airway cart at bedside. Pt agonal breathing and not responding to voice. Pt pale in color and appears to be struggling to breath. Respiratory therapist and pharmacist at bedside. MD states he wants to intubate. Multiple RNs at bedside hooking patient up to monitor and gaining IV access. Pharmacist at bedside preparing intubation meds. Family outside of room reports that patient has Hx of MDS, receives blood about every other day and last unit was yesterday; pt platelets run low and family reports that she was laying down napping and patient called one of her daughters complaining of seeing spots and a headache to the front and back of her head. Family in the medical field and reports that they attempted to complete a NIH at home on the patient and she was responding well, however at one point the patient's eyes began to cross and she developed and unsteady gait. Family reports the put her into the car and drove here immediately and en route family reports patient had some seizure like activity and then had postictal like symptoms. Pt arrived to the ED POV and needed to be removed from the vehicle and appeared as described above when transferred to Room 19. Shortly after patient was intubated she was transferred from Room 19 into Room 1.

## 2018-04-25 NOTE — DISCHARGE SUMMARY
Death Note  Critical Care Medicine      Admit Date: 2018    Date of Death: 2018    Time of Death: 05018    Attending Physician: Jess Lemos MD    Principal Diagnoses: Nontraumatic subarachnoid hemorrhage    Preliminary Cause of Death: Nontraumatic subarachnoid hemorrhage    Secondary Diagnoses:   Active Hospital Problems    Diagnosis  POA    *Nontraumatic subarachnoid hemorrhage [I60.9]  Yes     Priority: 1 - High    Seizure prophylaxis [Z29.8]  Not Applicable    Cerebellar edema [G93.6]  Unknown    COPD (chronic obstructive pulmonary disease) [J44.9]  Yes    Bleeding disorder [D69.9]  Yes    MDS (myelodysplastic syndrome) [D46.9]  Yes    Symptomatic anemia [D64.9]  Yes    Essential hypertension [I10]  Yes    Thrombocytopenia, unspecified [D69.6]  Yes      Resolved Hospital Problems    Diagnosis Date Resolved POA   No resolved problems to display.        Discharged Condition:     HPI:  Brooke is a 63 y.o. female with a PMH of HTN, smoker, thrombocytopenia, and Myelodysplastic Syndrome who presents to [facility] wit a SAH s/p intubation in the ED. She was witnessed by family having had some head discomfort and crossing of the eyes. She had decreased responsiveness en route to the hospital. By the time she arrived here, pt minimally responsive and showed reduced ventilation effort while on the CT table.She is being admitted to [facility] for a higher level of care.     Hospital/ICU Course:  : admit [facility] CTH, CTA: 0.3 x 0.3 cm anterior projecting saccular aneurysm at the distal left vertebral artery.  Consider cerebral angiography for further evaluation.  Occlusion of the left internal carotid artery with a small amount of distal retrograde filling.  Likely hemodynamically significant stenosis at the proximal right internal carotid artery.  Redemonstration of acute subarachnoid hemorrhage throughout the subarachnoid basilar cisterns, sylvian fissures, and right insular sulcus with  extension into the ventricular system.  Volume of subarachnoid hemorrhage appears to have increased slightly.  Otherwise, findings are similar when compared to recent prior examination.  Recommend continued follow-up.  Possible developing obstructive hydrocephalus, as previously noted.  Patchy pulmonary consolidation and scattered ground-glass attenuation which may be infectious or inflammatory in nature.   4/25: Family requested patient be transitioned to comfort care.         Consultations were held with the family regarding the patient's expected poor prognosis. At the direction of the family, the patient was extubated and measures to ensure the comfort of the patient including, but not limited to, morphine as needed for pain and air hunger as well as benzodiazepines as needed for agitation. The patient was subsequently declared dead.

## 2018-04-25 NOTE — PROCEDURES
"Brooke Olivas is a 63 y.o. female patient.    Temp: 99.4 °F (37.4 °C) (18)  Pulse: 79 (18)  Resp: 18 (18)  BP: (!) 138/56 (18)  SpO2: 100 % (18)  Weight: 72.6 kg (160 lb) (18)       Arterial Line  Date/Time: 2018 9:42 PM  Performed by: HAILEY PETERSON  Authorized by: HAILEY PETERSON   Consent Done: Yes  Consent: Written consent obtained.  Risks and benefits: risks, benefits and alternatives were discussed  Consent given by: daughter.  Patient identity confirmed: , MRN and name  Time out: Immediately prior to procedure a "time out" was called to verify the correct patient, procedure, equipment, support staff and site/side marked as required.  Preparation: Patient was prepped and draped in the usual sterile fashion.  Indications: multiple ABGs, respiratory failure and hemodynamic monitoring  Location: right radial  Patient sedated: no  Ramos's test normal: yes  Needle gauge: 20  Seldinger technique: Seldinger technique used  Number of attempts: 1  Complications: No  Post-procedure: dressing applied  Post-procedure CMS: normal  Patient tolerance: Patient tolerated the procedure well with no immediate complications          Hailey Peterson PA-C  2018  "

## 2018-04-25 NOTE — PROGRESS NOTES
Pt remains hypotensive after 500 cc NS bolus. NCC notified. Orders for 1 L NS bolus and levo gtt. Will continue to monitor.

## 2018-04-25 NOTE — ED PROVIDER NOTES
"Encounter Date: 4/24/2018    SCRIBE #1 NOTE: I, Aliyah Lowe, am scribing for, and in the presence of,  Dr. Shelton . I have scribed the following portions of the note - Other sections scribed: HPI, ROS, PE, procedure note.       History     Chief Complaint   Patient presents with    Altered Mental Status     confusion started 20 min ago per daughter. baseline aaox4, last plt 1, hx of Myelodysplastic Syndrome. family reports seizure en route.      Time seen by provider: 8:02 PM    This is a 63 y.o. female who was witnessed by family having had some head discomfort and crossing of the eyes. She had decreased responsiveness en route to the hospital. By the time she arrived here, pt minimally responsive and showed reduced ventilation effort while on the CT table. Brought to pt's room and intubated. Blood pressure was noted to be very high. Successful intubation on the first pass. Family told me pt has Myelodysplastic Syndrome and platelet count of 1.       The history is provided by a relative and medical records.     Review of patient's allergies indicates:   Allergen Reactions    Cephalosporins Hives    Compazine [prochlorperazine]      Patient says that she "looses control of her jaw"     Penicillins     Sulfa (sulfonamide antibiotics)      Past Medical History:   Diagnosis Date    Essential hypertension 3/17/2018    Mild asthma 3/17/2018    Smoker      Past Surgical History:   Procedure Laterality Date    PORTACATH PLACEMENT      TUBAL LIGATION       No family history on file.  Social History   Substance Use Topics    Smoking status: Former Smoker     Packs/day: 1.50     Years: 40.00     Types: Cigarettes     Quit date: 3/23/2018    Smokeless tobacco: Never Used    Alcohol use No     Review of Systems   Unable to perform ROS: Intubated   Respiratory:        Positive for reduced ventilation effort.    Neurological: Positive for headaches.        Positive for decreased responsiveness.  "       Physical Exam     Initial Vitals [04/24/18 2000]   BP Pulse Resp Temp SpO2   (!) 181/106 72 16 99.4 °F (37.4 °C) --      MAP       131         Physical Exam    Constitutional: She appears well-developed.   HENT:   Head: Normocephalic.   Mouth/Throat: Oropharynx is clear and moist.   Mild epistaxis   Eyes:   Dysconjugate gaze, min reactive pupils.    Neck:   Active rom, no jvd   Cardiovascular: Normal rate and regular rhythm.   Pulmonary/Chest:   Min respiratory effort, bradypnea, snoring resp. Longs clear.  Resp rate of 6.    Abdominal: Soft. She exhibits no distension.   Musculoskeletal:   flacid   Neurological:   Nonresponsive to commands, GCS - 3   Skin: Skin is warm. Capillary refill takes less than 2 seconds.         ED Course   Intubation  Date/Time: 4/24/2018 8:17 PM  Location procedure was performed: [facility] EMERGENCY DEPARTMENT  Performed by: IBRAHIMA SÁNCHEZ  Authorized by: IBRAHIMA SÁNCHEZ   Indications: respiratory failure  Paralytic: succinylcholine (100 mg)  Laryngoscope size: Mac 3  Number of attempts: 1  Post-procedure assessment: ETCO2 monitor (Good color change)  Breath sounds: clear (bilaterally)  Comments: 7, 52, 21 at the teeth.     Critical Care  Date/Time: 4/24/2018 8:47 PM  Performed by: IBRAHIMA SÁNCHEZ  Authorized by: IBRAHIMA SÁNCHEZ   Direct patient critical care time: 30 minutes  Additional history critical care time: 5 minutes  Ordering / reviewing critical care time: 5 minutes  Documentation critical care time: 5 minutes  Consulting other physicians critical care time: 10 minutes  Total critical care time (exclusive of procedural time) : 55 minutes  Critical care time was exclusive of separately billable procedures and treating other patients and teaching time.  Critical care was necessary to treat or prevent imminent or life-threatening deterioration of the following conditions: respiratory failure (neurologic failure, SAH).  Critical care was time spent  personally by me on the following activities: evaluation of patient's response to treatment, obtaining history from patient or surrogate, ordering and review of laboratory studies, review of old charts, examination of patient, ordering and performing treatments and interventions, ordering and review of radiographic studies, re-evaluation of patient's condition, ventilator management and discussions with consultants.        Labs Reviewed - No data to display  EKG Readings: (Independently Interpreted)   Rhythm: Normal Sinus Rhythm. Heart Rate: 88. ST Segments: Normal ST Segments. T Waves: Normal. Axis: Left Axis Deviation. Q Waves: V1 and V2. Other Findings: Prolonged QT Interval. Clinical Impression: Normal Sinus Rhythm Other Impression: LVH, ant q waves          Medical Decision Making:   History:   Old Medical Records: I decided to obtain old medical records.  Clinical Tests:   Lab Tests: Ordered and Reviewed  Radiological Study: Ordered and Reviewed  Medical Tests: Ordered and Reviewed  Will be taken to neuro icu/ neurosurgery will eval for evc. Stat platelet transfusion recc per neuro. Bp normalized in ER. ETT in good position.             Scribe Attestation:   Scribe #1: I performed the above scribed service and the documentation accurately describes the services I performed. I attest to the accuracy of the note.               Clinical Impression:   Diagnoses of Stroke and Stroke were pertinent to this visit.                           John Shelton MD  04/24/18 2048       John Shelton MD  04/24/18 2115

## 2018-04-25 NOTE — ASSESSMENT & PLAN NOTE
Stroke RF  Hasn't been requiring antiHTNsives outpatient recently 2/2 severe anemia  Currently requiring cardene for goal SBP<140  Management per NCC

## 2018-04-25 NOTE — ED NOTES
Emergency Platelets started while pt was in CT scanner. Two nurses present for administration and pt verification. Infusion ran while pt was transported from CT to [facility].

## 2018-04-25 NOTE — SUBJECTIVE & OBJECTIVE
Interval History:  Admit NCC     Review of Systems   Unable to obtain a complete ROS due to level of consciousness.  Objective:     Vitals:  Temp: 99.4 °F (37.4 °C)  Pulse: 72  BP: (!) 138/56  MAP (mmHg): 90  Resp: 20  SpO2: 100 %  Oxygen Concentration (%): 50  O2 Device (Oxygen Therapy): ventilator  Vent Mode: A/C  Set Rate: 16 bmp  Vt Set: 450 mL  Pressure Support: 0 cmH20  PEEP/CPAP: 5 cmH20  Peak Airway Pressure: 21 cmH2O  Mean Airway Pressure: 8.5 cmH20  Plateau Pressure: 0 cmH20    Temp  Min: 99.4 °F (37.4 °C)  Max: 99.4 °F (37.4 °C)  Pulse  Min: 72  Max: 96  BP  Min: 138/56  Max: 181/106  MAP (mmHg)  Min: 90  Max: 109  Resp  Min: 16  Max: 20  SpO2  Min: 100 %  Max: 100 %  Oxygen Concentration (%)  Min: 50  Max: 50    No intake/output data recorded.           Physical Exam      Physical Exam:  GA: unresponsive, comfortable, no acute distress.   HEENT: No scleral icterus or JVD.   Pulmonary: Clear to auscultation A/P/L.   Cardiac: RRR S1 & S2 w/o rubs/murmurs/gallops.   Abdominal: Bowel sounds present x 4.   Skin: No jaundice, rashes, or visible lesions.  Neuro:  --GCS: E1 V1 M1  --Mental Status:  Unresponsive to all stimuli   --CN II-XII grossly intact.   --Pupils 4mm, nonreactive.   --Corneal reflex, gag, cough absent   -- No movement to stimuli       Unable to test orientation, language, memory, judgment, insight, fund of knowledge, hearing, shoulder shrug, tongue protrusion, coordination, gait due to level of consciousness.    Medications:  Continuous  nicardipine    propofol Last Rate: Stopped (04/24/18 2100)   Scheduled  levetiracetam IVPB 1,000 mg Once   [START ON 4/25/2018] levETIRAcetam in NaCl (iso-os) 500 mg Q12H   lidocaine-EPINEPHrine 1%-1:100,000 20 mL Once   niMODipine 60 mg Q4H   senna-docusate 8.6-50 mg 1 tablet BID   sodium chloride 0.9% 3 mL Q8H   vancomycin (VANCOCIN) IVPB 1,000 mg Q12H   PRN  sodium chloride  Q24H PRN   sodium chloride  Q24H PRN   sodium chloride  Q24H PRN   sodium chloride   Q24H PRN   sodium chloride  Q24H PRN   acetaminophen 650 mg Q6H PRN   labetalol 10 mg Q4H PRN   ondansetron 8 mg Q8H PRN     Today I personally reviewed pertinent medications, lines/drains/airways, imaging, lab results,     Diet  Diet NPO  Diet NPO

## 2018-04-25 NOTE — ASSESSMENT & PLAN NOTE
[id number] SAH    -had long discussion with pts family regarding placement of EVD. At this time, CTA shows decreased distal blood flow with hydrocephalus. Pt's family acknowledged that she would not want anything done at this time. Agree with pts family wishes.

## 2018-04-25 NOTE — ASSESSMENT & PLAN NOTE
Presented with HA, n/v, and decreased LOC.  Intubated on arrival.  Being admitted to [facility].    Antithrombotics for secondary stroke prevention: N/A SAH    Statins for secondary stroke prevention and hyperlipidemia, if present:   Statins: None: Reason: not indicated; SAH    Aggressive risk factor modification: HTN, Diet, Exercise, Obesity, myelodysplastic syndrome     Rehab efforts: PT/OT/SLP if/when appropriate    Diagnostics ordered/pending: CTA Neck/Arch to assess vasculature, HgbA1C to assess blood glucose levels, Lipid Profile to assess cholesterol levels, TTE to assess cardiac function/status , TSH to assess thyroid function.  +/- DSA, depending on CTA findings.      VTE prophylaxis: None: Reason for No Pharmacological VTE Prophylaxis: History of systemic or intracranial bleeding, Known or suspected cerebral aneurysm or AVM, Impaired hemostasis:  Platelets < 100,000 / uL    BP parameters: SAH: Unsecured aneurysm, SBP <140

## 2018-04-25 NOTE — PROGRESS NOTES
Stroke activated, patient intubated per MD. CT scan done. Patient on ordered ventilator settings. Will continue to monitor.

## 2018-04-25 NOTE — HPI
Brooke is a 63 y.o. female with a PMH of HTN, smoker, thrombocytopenia, and Myelodysplastic Syndrome who presents to [facility] wit a SAH s/p intubation in the ED. She was witnessed by family having had some head discomfort and crossing of the eyes. She had decreased responsiveness en route to the hospital. By the time she arrived here, pt minimally responsive and showed reduced ventilation effort while on the CT table.She is being admitted to [facility] for a higher level of care.

## 2018-04-25 NOTE — ASSESSMENT & PLAN NOTE
- NSGY following   - CTA: 0.3 x 0.3 cm anterior projecting saccular aneurysm at the distal left vertebral artery.  Consider cerebral angiography for further evaluation. Occlusion of the left internal carotid artery with a small amount of distal retrograde filling. Likely hemodynamically significant stenosis at the proximal right internal carotid artery. Redemonstration of acute subarachnoid hemorrhage throughout the subarachnoid basilar cisterns, sylvian fissures, and right insular sulcus with extension into the ventricular system.  Volume of subarachnoid hemorrhage appears to have increased slightly.  Otherwise, findings are similar when compared to recent prior examination.  Recommend continued follow-up. Possible developing obstructive hydrocephalus, as previously noted.   - SBP < 140   - Transfuse plt  - TCD's   - Nimodipine   - Keppra 1 G then 500 Q 12  - Family discussion held on goals of care by Dr Lemos pending family decision

## 2018-04-25 NOTE — HOSPITAL COURSE
4/24: admit [facility], CTA: 0.3 x 0.3 cm anterior projecting saccular aneurysm at the distal left vertebral artery.  Consider cerebral angiography for further evaluation.  Occlusion of the left internal carotid artery with a small amount of distal retrograde filling.  Likely hemodynamically significant stenosis at the proximal right internal carotid artery.  Redemonstration of acute subarachnoid hemorrhage throughout the subarachnoid basilar cisterns, sylvian fissures, and right insular sulcus with extension into the ventricular system.  Volume of subarachnoid hemorrhage appears to have increased slightly.  Otherwise, findings are similar when compared to recent prior examination.  Recommend continued follow-up.  Possible developing obstructive hydrocephalus, as previously noted.  Patchy pulmonary consolidation and scattered ground-glass attenuation which may be infectious or inflammatory in nature.

## 2018-04-25 NOTE — PROGRESS NOTES
Pt becoming more hypotensive. NCC notified. Orders to give 500 cc NS bolus. Will continue to monitor.

## 2018-04-25 NOTE — HPI
"Ms. Olivas is a 62 yo F with myelodysplastic syndrome (on azacitadine per clinical trial) with RBC transfusions q2-3 days, HTN (no longer on meds 2/2 severe anemia), COPD, and former tobacco abuse who presented with decreased LOC and was found to have SAH without IVH extension.  Per her daughter and son-in-law (former [employer] nurse), patient had been napping, and woke up around 7:30pm complaining of HA, followed quickly by n/v and decreased LOC.  ?seizure en route, described as RUE trembling, during which patient was "out of it."  Intubated in the ED for concerns of respiratory arrest.  Being admitted to [facility]; plan for EVD by [place] this evening.  "

## 2018-04-25 NOTE — CONSULTS
[facility]  Neurosurgery  Consult Note    Consults  Subjective:     Chief Complaint/Reason for Admission: SAH    History of Present Illness: 63 year old female presents with worst HA of life. Pt found to have SAH and was subsequently intubated for worsening AMS. CTA showed verterbrobasilar aneurysm. Patient currently GCS3T without brainstem reflexes.     Facility-Administered Medications Prior to Admission   Medication Dose Route Frequency Provider Last Rate Last Dose    potassium bicarbonate & potassium chloride disintegrating tablet 50 mEq  50 mEq Oral 1 time in Clinic/HOD Stephanie Hopkins MD         Prescriptions Prior to Admission   Medication Sig Dispense Refill Last Dose    acetaminophen (TYLENOL) 325 MG tablet Take 325 mg by mouth 2 (two) times daily.   Taking    acyclovir (ZOVIRAX) 400 MG tablet Take 1 tablet (400 mg total) by mouth 2 (two) times daily. 60 tablet 3     albuterol (VENTOLIN HFA) 90 mcg/actuation inhaler Inhale 1 puff into the lungs every 6 (six) hours as needed for Wheezing or Shortness of Breath. Rescue   Taking    aminocaproic acid 1,000 mg Tab Take 2,000 mg by mouth every 8 (eight) hours. 180 each 3     aprepitant (EMEND) 40 MG capsule Take 2 capsules (80 mg total) by mouth once daily. 60 capsule 0     buPROPion (WELLBUTRIN) 100 MG tablet Take 1 tablet (100 mg total) by mouth 2 (two) times daily. 60 tablet 11     ciprofloxacin HCl (CIPRO) 500 MG tablet Take 1 tablet (500 mg total) by mouth every 12 (twelve) hours. 60 tablet 3     fluconazole (DIFLUCAN) 200 MG Tab Take 2 tablets (400 mg total) by mouth once daily. 60 tablet 3     ondansetron (ZOFRAN-ODT) 4 MG TbDL Take 1 tablet (4 mg total) by mouth every 8 (eight) hours as needed. Take prior to Azacitidine and as needed for nausea/vomiting. 30 tablet 5 Taking    potassium chloride (MICRO-K) 10 MEQ CpSR Take 4 capsules (40 mEq total) by mouth once daily. 120 capsule 0     promethazine (PHENERGAN) 12.5 MG Tab  "Take 1 tablet (12.5 mg total) by mouth 4 (four) times daily. 60 tablet 3        Review of patient's allergies indicates:   Allergen Reactions    Cephalosporins Hives    Compazine [prochlorperazine]      Patient says that she "looses control of her jaw"     Penicillins     Sulfa (sulfonamide antibiotics)        Past Medical History:   Diagnosis Date    Essential hypertension 3/17/2018    Mild asthma 3/17/2018    Smoker      Past Surgical History:   Procedure Laterality Date    PORTACATH PLACEMENT      TUBAL LIGATION       Family History     None        Social History Main Topics    Smoking status: Former Smoker     Packs/day: 1.50     Years: 40.00     Types: Cigarettes     Quit date: 3/23/2018    Smokeless tobacco: Never Used    Alcohol use No    Drug use: No    Sexual activity: Not Currently     Review of Systems   Unable to perform ROS: Intubated     Objective:     Weight: 72.6 kg (160 lb)  Body mass index is 27.46 kg/m².  Vital Signs (Most Recent):  Temp: 99.4 °F (37.4 °C) (04/24/18 2000)  Pulse: 73 (04/24/18 2147)  Resp: 16 (04/24/18 2147)  BP: (!) 138/56 (04/24/18 2036)  SpO2: 100 % (04/24/18 2147) Vital Signs (24h Range):  Temp:  [99.4 °F (37.4 °C)] 99.4 °F (37.4 °C)  Pulse:  [72-96] 73  Resp:  [16-18] 16  SpO2:  [100 %] 100 %  BP: (138-181)/() 138/56  Arterial Line BP: (129)/(43) 129/43                 Vent Mode: A/C  Oxygen Concentration (%):  [50] 50  Resp Rate Total:  [16 br/min] 16 br/min  Vt Set:  [450 mL] 450 mL  PEEP/CPAP:  [5 cmH20] 5 cmH20  Pressure Support:  [0 cmH20-10 cmH20] 0 cmH20  Mean Airway Pressure:  [8.5 cmH20-8.6 cmH20] 8.5 cmH20         Urethral Catheter 04/24/18 2042 Straight-tip;Non-latex (Active)       Physical Exam:    Constitutional: She appears well-developed and well-nourished.     Eyes: Right eye exhibits no discharge. Left eye exhibits no discharge.   Fundoscopic exam:       The right eye shows no hemorrhage.        The left eye shows no hemorrhage.   No " scleral icterus.     Cardiovascular: Normal rate, regular rhythm and normal pulses.     Abdominal: Soft. Bowel sounds are normal.     Skin: Skin displays no rash on trunk and no rash on extremities.     Musculoskeletal:   Appropriate tone, no evidence of spasm      Neurological:   GCS [id number]  No brainstem reflexes  No mvmt to painful stimuli       Significant Labs:    Recent Labs  Lab 04/23/18  0907 04/24/18 2025   * 180*    132*   K 3.6 3.7    101   CO2 23 14*   BUN 15 16   CREATININE 0.6 0.7   CALCIUM 8.7 9.0       Recent Labs  Lab 04/23/18  0907 04/24/18 2025   WBC 0.48* 0.97*   HGB 6.7* 8.9*   HCT 19.6* 25.2*   PLT 1* 2*       Recent Labs  Lab 04/24/18 2025   INR 1.1   APTT 23.5     Microbiology Results (last 7 days)     ** No results found for the last 168 hours. **        All pertinent labs from the last 24 hours have been reviewed.    Significant Diagnostics:  CT: Ct Head Without Contrast    Result Date: 4/24/2018  Acute subarachnoid hemorrhage with interventricular extension throughout the subarachnoid basilar cisterns, sylvian fissures and extending into the right insular sulcus and ventricular system.  Recommend neurosurgical consultation. Lateral ventricular prominence greater than expected given this patient's age and level of generalized cerebral volume loss.  Developing obstructive hydrocephalus cannot be excluded. COMMUNICATION This critical result was discovered/received at 2025.  The critical information above was relayed directly by me by telephone to John Shelton MD on 04/24/2018 at 2030. Electronically signed by resident: Alvin Arroyo Date:    04/24/2018 Time:    20:33 Electronically signed by: Andrey Fuentes MD Date:    04/24/2018 Time:    21:10    Assessment/Plan:     * Nontraumatic subarachnoid hemorrhage    [id number] SAH    -had long discussion with pts family regarding placement of EVD. At this time, CTA shows decreased distal blood flow with hydrocephalus. Pt's  family acknowledged that she would not want anything done at this time. Agree with pts family wishes.             Thank you for your consult. I will follow-up with patient. Please contact us if you have any additional questions.    Howie Higuera MD  Neurosurgery  [facility]

## 2018-04-25 NOTE — PROGRESS NOTES
Patient arrived to [facility] via RN x2 and RT.     Type of Stroke: SAH    TPA start time and end time (if applicable)    Patients current symptoms include: unresponsive, pupils dilated and fixed    NCC at bedside. NSGY at bedside.     Arterial line placed per NCC

## 2018-04-25 NOTE — SUBJECTIVE & OBJECTIVE
"    Past Medical History:   Diagnosis Date    Essential hypertension 3/17/2018    Mild asthma 3/17/2018    Smoker      Past Surgical History:   Procedure Laterality Date    PORTACATH PLACEMENT      TUBAL LIGATION       No family history on file.  Social History   Substance Use Topics    Smoking status: Former Smoker     Packs/day: 1.50     Years: 40.00     Types: Cigarettes     Quit date: 3/23/2018    Smokeless tobacco: Never Used    Alcohol use No     Review of patient's allergies indicates:   Allergen Reactions    Cephalosporins Hives    Compazine [prochlorperazine]      Patient says that she "looses control of her jaw"     Penicillins     Sulfa (sulfonamide antibiotics)        Medications: I have reviewed the current medication administration record.      (Not in a hospital admission)    Review of Systems   Respiratory: Positive for shortness of breath.    Gastrointestinal: Positive for nausea and vomiting.   Skin: Negative for wound.   Neurological: Positive for seizures and headaches.   Psychiatric/Behavioral: Positive for decreased concentration.     Objective:     Vital Signs (Most Recent):  Temp: 99.4 °F (37.4 °C) (04/24/18 2000)  Pulse: 79 (04/24/18 2036)  Resp: 18 (04/24/18 2036)  BP: (!) 138/56 (04/24/18 2036)  SpO2: 100 % (04/24/18 2036)    Vital Signs Range (Last 24H):  Temp:  [99.4 °F (37.4 °C)]   Pulse:  [72-96]   Resp:  [16-18]   BP: (138-181)/()   SpO2:  [100 %]     Physical Exam   Constitutional: She appears well-developed and well-nourished.   HENT:   Head: Normocephalic and atraumatic.   Pulmonary/Chest: Effort normal.   intubated   Nursing note and vitals reviewed.      Neurological Exam:   Comatose  No brainstem reflexes  Flaccid throughout    Laboratory:  CMP: No results for input(s): GLUCOSE, CALCIUM, ALBUMIN, PROT, NA, K, CO2, CL, BUN, CREATININE, ALKPHOS, ALT, AST, BILITOT in the last 24 hours.  CBC:   Recent Labs  Lab 04/23/18  0907 04/24/18 2025   WBC 0.48* 0.97*   RBC " 2.26* 2.90*   HGB 6.7* 8.9*   HCT 19.6* 25.2*   PLT 1*  --    MCV 87 87   MCH 29.6 30.7   MCHC 34.2 35.3     Lipid Panel: No results for input(s): CHOL, LDLCALC, HDL, TRIG in the last 168 hours.  Coagulation:   Recent Labs  Lab 04/24/18 2025   INR 1.1   APTT 23.5     Hgb A1C: No results for input(s): HGBA1C in the last 168 hours.  TSH: No results for input(s): TSH in the last 168 hours.    Diagnostic Results:    Brain imaging:  CTH 4/24/18:  SAH without IVH extension      Vessel Imaging:  pending    Cardiac Evaluation:   pending

## 2018-04-25 NOTE — ASSESSMENT & PLAN NOTE
Platelet count 1 on arrival  2/2 Myelodysplastic syndrome  Ideal goal plt>100k; transfuse per NCC/NSGY

## 2018-04-25 NOTE — PROGRESS NOTES
Pt transported from ED. Placed on documented vent settings. ET tube secure. Will continue to monitor.

## 2018-04-25 NOTE — CONSULTS
[facility]  Vascular Neurology  Comprehensive Stroke Center  Consult Note    Inpatient consult to Vascular (Stroke) Neurology  Consult performed by: NEEMA TARIQ  Consult ordered by: CONCHA DALAL        Assessment/Plan:     Patient is a 63 y.o. year old female with:    * Nontraumatic subarachnoid hemorrhage    Presented with HA, n/v, and decreased LOC.  Intubated on arrival.  Being admitted to [facility].    Antithrombotics for secondary stroke prevention: N/A SAH    Statins for secondary stroke prevention and hyperlipidemia, if present:   Statins: None: Reason: not indicated; SAH    Aggressive risk factor modification: HTN, Diet, Exercise, Obesity, myelodysplastic syndrome     Rehab efforts: PT/OT/SLP if/when appropriate    Diagnostics ordered/pending: CTA Neck/Arch to assess vasculature, HgbA1C to assess blood glucose levels, Lipid Profile to assess cholesterol levels, TTE to assess cardiac function/status , TSH to assess thyroid function.  +/- DSA, depending on CTA findings.      VTE prophylaxis: None: Reason for No Pharmacological VTE Prophylaxis: History of systemic or intracranial bleeding, Known or suspected cerebral aneurysm or AVM, Impaired hemostasis:  Platelets < 100,000 / uL    BP parameters: SAH: Unsecured aneurysm, SBP <140        MDS (myelodysplastic syndrome)    Currently on azacitadine (drug trial); gets pRBC transfusions q2-3 days  Plt count 1 on arrival, with H/H 6.7/19.6, WBL 0.48  Management per [facility]        Bleeding disorder    2/2 myelodysplastic syndrome        COPD (chronic obstructive pulmonary disease)    2/2 longstanding tobacco abuse (patient has since quit)  Currently intubated  Vent Mode: SIMV  Oxygen Concentration (%):  [50] 50  Resp Rate Total:  [16 br/min] 16 br/min  Vt Set:  [450 mL] 450 mL  PEEP/CPAP:  [5 cmH20] 5 cmH20  Pressure Support:  [10 cmH20] 10 cmH20  Mean Airway Pressure:  [8.5 cmH20-8.6 cmH20] 8.6 cmH20    Management per [facility]        Thrombocytopenia,  unspecified    Platelet count 1 on arrival  2/2 Myelodysplastic syndrome  Ideal goal plt>100k; transfuse per NCC/NSGY        Essential hypertension    Stroke RF  Hasn't been requiring antiHTNsives outpatient recently 2/2 severe anemia  Currently requiring cardene for goal SBP<140  Management per NCC            STROKE DOCUMENTATION          NIH Scale:  Interval: baseline (upon arrival/admit)  1a. Level Of Consciousness: 3-->Responds only with reflex motor or autonomic effects or totally unresponsive, flaccid, and areflexic  1b. LOC Questions: 2-->Answers neither question correctly  1c. LOC Commands: 2-->Performs neither task correctly  2. Best Gaze: 0-->Normal  3. Visual: 3-->Bilateral hemianopia (blind including cortical blindness)  4. Facial Palsy: 0-->Normal symmetrical movements  5a. Motor Arm, Left: 4-->No movement  5b. Motor Arm, Right: 4-->No movement  6a. Motor Leg, Left: 4-->No movement  6b. Motor Leg, Right: 4-->No movement  7. Limb Ataxia: 0-->Absent  8. Sensory: 0-->Normal: no sensory loss  9. Best Language: 3-->Mute, global aphasia: no usable speech or auditory comprehension  10. Dysarthria: 2-->Severe dysarthria: patients speech is so slurred as to be unintelligible in the absence of or out of proportion to any dysphasia, or is mute/anarthric  11. Extinction and Inattention (formerly Neglect): 0-->No abnormality  Total (NIH Stroke Scale): 31    Modified [place] Score: 1  [place] Coma Scale:3   ABCD2 Score:    [id number] Score:   HAS -BLED Score:   ICH Score:   Hunt & Alexander Classification:Grade V      Thrombolysis Candidate? No, CT findings (ICH, SAH, etc)       Interventional Revascularization Candidate?   Is the patient eligible for mechanical endovascular reperfusion (LINDSAY)?  No; No large vessel occlusion      Hemorrhagic change of an Ischemic Stroke: Does this patient have an ischemic stroke with hemorrhagic changes? No     Subjective:     History of Present Illness:  Ms. Olivas is a 62 yo F with  "myelodysplastic syndrome (on azacitadine per clinical trial) with RBC transfusions q2-3 days, HTN (no longer on meds 2/2 severe anemia), COPD, and former tobacco abuse who presented with decreased LOC and was found to have SAH without IVH extension.  Per her daughter and son-in-law (former [facility] nurse), patient had been napping, and woke up around 7:30pm complaining of HA, followed quickly by n/v and decreased LOC.  ?seizure en route, described as RUE trembling, during which patient was "out of it."  Intubated in the ED for concerns of respiratory arrest.  Being admitted to [facility]; plan for possible EVD by NSGY this evening.        Past Medical History:   Diagnosis Date    Essential hypertension 3/17/2018    Mild asthma 3/17/2018    Smoker      Past Surgical History:   Procedure Laterality Date    PORTACATH PLACEMENT      TUBAL LIGATION       No family history on file.  Social History   Substance Use Topics    Smoking status: Former Smoker     Packs/day: 1.50     Years: 40.00     Types: Cigarettes     Quit date: 3/23/2018    Smokeless tobacco: Never Used    Alcohol use No     Review of patient's allergies indicates:   Allergen Reactions    Cephalosporins Hives    Compazine [prochlorperazine]      Patient says that she "looses control of her jaw"     Penicillins     Sulfa (sulfonamide antibiotics)        Medications: I have reviewed the current medication administration record.      (Not in a hospital admission)    Review of Systems   Respiratory: Positive for shortness of breath.    Gastrointestinal: Positive for nausea and vomiting.   Skin: Negative for wound.   Neurological: Positive for seizures and headaches.   Psychiatric/Behavioral: Positive for decreased concentration.     Objective:     Vital Signs (Most Recent):  Temp: 99.4 °F (37.4 °C) (04/24/18 2000)  Pulse: 79 (04/24/18 2036)  Resp: 18 (04/24/18 2036)  BP: (!) 138/56 (04/24/18 2036)  SpO2: 100 % (04/24/18 2036)    Vital Signs Range (Last " 24H):  Temp:  [99.4 °F (37.4 °C)]   Pulse:  [72-96]   Resp:  [16-18]   BP: (138-181)/()   SpO2:  [100 %]     Physical Exam   Constitutional: She appears well-developed and well-nourished.   HENT:   Head: Normocephalic and atraumatic.   Pulmonary/Chest: Effort normal.   intubated   Nursing note and vitals reviewed.      Neurological Exam:   Comatose  No brainstem reflexes  Flaccid throughout    Laboratory:  CMP: No results for input(s): GLUCOSE, CALCIUM, ALBUMIN, PROT, NA, K, CO2, CL, BUN, CREATININE, ALKPHOS, ALT, AST, BILITOT in the last 24 hours.  CBC:   Recent Labs  Lab 04/23/18 0907 04/24/18 2025   WBC 0.48* 0.97*   RBC 2.26* 2.90*   HGB 6.7* 8.9*   HCT 19.6* 25.2*   PLT 1*  --    MCV 87 87   MCH 29.6 30.7   MCHC 34.2 35.3     Lipid Panel: No results for input(s): CHOL, LDLCALC, HDL, TRIG in the last 168 hours.  Coagulation:   Recent Labs  Lab 04/24/18 2025   INR 1.1   APTT 23.5     Hgb A1C: No results for input(s): HGBA1C in the last 168 hours.  TSH: No results for input(s): TSH in the last 168 hours.    Diagnostic Results:    Brain imaging:  CTH 4/24/18:  SAH without IVH extension      Vessel Imaging:  pending    Cardiac Evaluation:   pending      Negra Pa MD  Comprehensive Stroke Center  Department of Vascular Neurology   [facility]

## 2018-04-25 NOTE — ASSESSMENT & PLAN NOTE
2/2 longstanding tobacco abuse (patient has since quit)  Currently intubated  Vent Mode: SIMV  Oxygen Concentration (%):  [50] 50  Resp Rate Total:  [16 br/min] 16 br/min  Vt Set:  [450 mL] 450 mL  PEEP/CPAP:  [5 cmH20] 5 cmH20  Pressure Support:  [10 cmH20] 10 cmH20  Mean Airway Pressure:  [8.5 cmH20-8.6 cmH20] 8.6 cmH20    Management per NCC

## 2018-05-02 ENCOUNTER — DOCUMENTATION ONLY (OUTPATIENT)
Dept: TRANSFUSION MEDICINE | Facility: HOSPITAL | Age: 63
End: 2018-05-02

## 2018-05-02 NOTE — PROGRESS NOTES
The HLA typing for Brooke Olivas hasbeen completed.  The low and high resolution results are concordant and the samples were collected on different days as required..    TYRONE Loomis MD, ELIZABETH  Histocompatability and Immunogenetics Lab  Section of Transfusion Medicine & Histocompatibility  Department of Pathology and Laboratory Medicine  [facility]  05/02/2018

## 2018-05-04 NOTE — PHYSICIAN QUERY
PT Name: Brooke Olivas  MR #: [id number]     Physician Query Form - Documentation Clarification      CDS/: Carla Cho RN, CDS               Contact information: [email or web address]    This form is a permanent document in the medical record.     Query Date: May 4, 2018    By submitting this query, we are merely seeking further clarification of documentation. Please utilize your independent clinical judgment when addressing the question(s) below.    The Medical record reflects the following:    Supporting Clinical Findings Location in Medical Record     --Pt becoming more hypotensive. NCC notified. Orders to give 500 cc NS bolus. Will continue to monitor.    --Pt remains hypotensive after 500 cc NS bolus. NCC notified. Orders for 1 L NS bolus and levo gtt. Will continue to monitor.    BP: 181/106->101/44->78/52  HR: 72->49     Nursing Note 4/25 @ 107a      Nursing Note 4/25 @229a        VS 4/24->4/25     --Nontraumatic subarachnoid hemorrhage   --Cerebellar edema   --She had decreased responsiveness en route to the hospital. By the time she arrived here, pt minimally responsive     Mannitol 75g IV x 1  NS 1L bolus x 1  Norepi gtt     Discharge Summary            MAR                                                                            Doctor, Please specify diagnosis or diagnoses associated with above clinical findings.    Provider Use Only      [  ] Hypotension    [  ] Neurogenic Shock    [  ] Other (plesae specify): ________________________                                                                                                               [ x ] Clinically undetermined

## 2018-05-04 NOTE — PHYSICIAN QUERY
"PT Name: Brooke Olivas  MR #: [id number]    Physician Query Form - Hematology Clarification      CDS/: Carla Cho RN, CDS               Contact information: [email or web address]    This form is a permanent document in the medical record.      Query Date: May 4, 2018    By submitting this query, we are merely seeking further clarification of documentation. Please utilize your independent clinical judgment when addressing the question(s) below.    The Medical record contains the following:   Indicators  Supporting Clinical Findings Location in Medical Record   x "Anemia" documented --Hasn't been requiring antiHTNsives outpatient recently 2/2 severe anemia    --Symptomatic anemia    Vas Neuro C/S      Discharge Summary (dr Lemos/JERE Hawkins)     x H & H = H/H: 8.9/25.2->4.5/13    RBC: 2.9->1.49  WBC: 0.97->0.37  Plts: 2->16   Labs: 4/24->4/25    BP =                     HR=      "GI bleeding" documented      Acute bleeding (Non GI site)     x Transfusion(s) --gets pRBC transfusions q2-3 days    PRBC x 1 Unit  Plts x 3 units   Vas Neuro C/S    Blood bank Flowsheet    Treatment:     x Other:  --MDS (myelodysplastic syndrome)    -Currently on azacitadine (drug trial);   --Thrombocytopenia, unspecified    -Platelet count 1 on arrival               -2/2 Myelodysplastic syndrome       Vas Neuro C/S     Provider, please specify diagnosis or diagnoses associated with above clinical findings.        [  ] Aplastic anemia d/t MDS  [  ] Aplastic anemia d/t  Azacitadine  [  ] Aplastic anemia, unspecified    [  ] Anemia of chronic disease ( Specify chronic disease)      [  ] Neoplastic disease      [  ] Due to Chemotherapy        [  ] Other Hematological Diagnosis (please specify): _________________________________    [ x ] Clinically Undetermined       Please document in your progress notes daily for the duration of treatment, until resolved, and include in your discharge summary.                           "

## 2018-05-04 NOTE — PHYSICIAN QUERY
PT Name: Brooke Olivas  MR #: [id number]    Physician Query Form - Respiratory Condition Clarification      CDS/: Carla Cho RN, CDS               Contact information: [email or web address]    This form is a permanent document in the medical record.    Query Date: May 4, 2018    By submitting this query, we are merely seeking further clarification of documentation. Please utilize your independent clinical judgment when addressing the question(s) below.    The Medical record contains the following   Indicators   Supporting Clinical Findings Location in Medical Record   x SOB, DIMAS, Wheezing, Productive Cough, Use of Accessory Muscles, etc. --Pt agonal breathing. Pt pale in color and appears to be struggling to breath.     -- showed reduced ventilation effort while on the CT table. Brought to pt's room and intubated             ED Nursing Note 4/24 @ 1006p      ED Provider note    x Acute/Chronic Illness --Intubated in the ED for concerns of respiratory arrest  --Nontraumatic subarachnoid hemorrhage   Vas neuro c/s 4/24    Radiology Findings      Respiratory Distress or Failure      Hypoxia or Hypercapnia     x RR         ABGs         O2 sat -Min respiratory effort, bradypnea, snoring resp. Longs clear.  Resp rate of 6.   ED Provider Note   x BiPAP/Intubation --patient intubated per MD   Resp Therapy Note 4/24 @ 829p      Supplemental O2      Home O2, Oxygen Dependence      Treatment     x Other --was subsequently intubated for worsening AMS NeuroSx C/S 4/24       Provider, please specify diagnosis or diagnoses associated with above clinical findings.    [    ] Acute Respiratory Failure with Hypoxia     [    ] Acute Respiratory Failure with Hypercapnia  [    ] Acute Respiratory Failure with Hypoxia and Hypercapnia  [    ] Other Acute Respiratory Failure      [    ] Acute Respiratory Insufficiency  [    ] Acute Respiratory Distress    [   x ] No Respiratory Failure, Placed on Vent for Airway  Protection    [    ] Other Respiratory Diagnosis (please specify): ________________________________________  [    ] Clinically Undetermined    Please document in your progress notes daily for the duration of treatment until resolved and include in your discharge summary.

## 2018-10-07 NOTE — PROGRESS NOTES
MD at bedside.   Tuesday, April 3rd, 2018    Protocol: , A Randomized Phase II/III Trial of Novel Therapeutics Versus Azacitidine in Newly Diagnosed Patients with Acute Myeloid Leukemia (AML) or High-Risk Myelodysplastic Syndrome (MDS), Age 60 or Older   Sponsor:  [facility] [place]: [id number]  IRB #: [id number]   Investigator: Sandeep Garner Initials: F, S     TREATMENT STUDY INFORMED CONSENT     After discussion with Dr. Hopkins, Research RN met with patient to discuss above-mentioned clinical trial. Patient visited in clinic, accompanied by daughter, Radha. Patient is awake, alert, and oriented to person, place, and time. She agrees to learn more about the trial and discuss participation.        Prior to the Informed Consent (IC) being signed, or any study protocol required data collection, testing, procedure, or intervention being performed, the following was done and/or discussed:  · Patient was given a copy of the IC for review   · Purpose of the study and qualifications to participate   · Confidentiality and HIPAA Authorization for Release of Medical Records for the research trial/ subject's rights/research related injury  · Risk, Benefits, Alternative Treatments, Compensation and Costs  · Participation in the research trial is voluntary and patient may withdraw at anytime  · Contact information for study related questions     Each page of the consent was reviewed. Patient denied questions at this time. Patient verbalized understanding. Contact information for CRC and PI given to patient.  Brooke Olivas signed the informed consent form freely for the treatment on  study. Witnessed by Research RN.  See attached documents for Informed Consent Process and copy of signed informed consent. Patient was given signed copy of informed consent form.     Following consent, patient's peripheral blood was collected.  Peripheral blood for optional tests were collected and shipped to to [facility]ukemia  Division)  [facility] in [place], [place].      Will continue to screen for eligibility while awaiting FLT3 results as well as local labs.  Labs and EKG completed after consent. Will randomize patient after confirmation of eligibility.

## 2020-08-26 NOTE — PROGRESS NOTES
Attempted to call report to JULIETH Gonsalez on oncology. RN stated she would call back in a few mins. RENETTA   Please notify patient of normal lab

## 2022-09-14 NOTE — PROGRESS NOTES
Pt to [facility] for PreProcedure workup.   Bcc Infiltrative Histology Text: There were numerous aggregates of basaloid cells demonstrating an infiltrative pattern.